# Patient Record
Sex: FEMALE | Race: BLACK OR AFRICAN AMERICAN | NOT HISPANIC OR LATINO | Employment: OTHER | URBAN - METROPOLITAN AREA
[De-identification: names, ages, dates, MRNs, and addresses within clinical notes are randomized per-mention and may not be internally consistent; named-entity substitution may affect disease eponyms.]

---

## 2023-02-01 ENCOUNTER — OFFICE VISIT (OUTPATIENT)
Dept: FAMILY MEDICINE | Facility: CLINIC | Age: 88
End: 2023-02-01
Payer: MEDICARE

## 2023-02-01 VITALS
DIASTOLIC BLOOD PRESSURE: 78 MMHG | WEIGHT: 162.5 LBS | RESPIRATION RATE: 20 BRPM | HEIGHT: 63 IN | OXYGEN SATURATION: 100 % | SYSTOLIC BLOOD PRESSURE: 132 MMHG | BODY MASS INDEX: 28.79 KG/M2 | TEMPERATURE: 98 F | HEART RATE: 71 BPM

## 2023-02-01 DIAGNOSIS — E44.1 MILD PROTEIN-CALORIE MALNUTRITION: ICD-10-CM

## 2023-02-01 DIAGNOSIS — I49.9 IRREGULAR HEART RHYTHM: ICD-10-CM

## 2023-02-01 DIAGNOSIS — E55.9 VITAMIN D DEFICIENCY: ICD-10-CM

## 2023-02-01 DIAGNOSIS — R63.4 WEIGHT LOSS: ICD-10-CM

## 2023-02-01 DIAGNOSIS — Z13.31 POSITIVE DEPRESSION SCREENING: ICD-10-CM

## 2023-02-01 DIAGNOSIS — Z23 NEED FOR PNEUMOCOCCAL VACCINATION: ICD-10-CM

## 2023-02-01 DIAGNOSIS — R26.89 IMBALANCE: ICD-10-CM

## 2023-02-01 DIAGNOSIS — F03.C0 SEVERE DEMENTIA WITHOUT BEHAVIORAL DISTURBANCE, PSYCHOTIC DISTURBANCE, MOOD DISTURBANCE, OR ANXIETY, UNSPECIFIED DEMENTIA TYPE: Primary | ICD-10-CM

## 2023-02-01 DIAGNOSIS — I10 PRIMARY HYPERTENSION: ICD-10-CM

## 2023-02-01 DIAGNOSIS — Z86.39 HISTORY OF ELEVATED GLUCOSE: ICD-10-CM

## 2023-02-01 LAB
ALBUMIN SERPL-MCNC: 3.9 G/DL (ref 3.4–4.8)
ALBUMIN/GLOB SERPL: 1.2 RATIO (ref 1.1–2)
ALP SERPL-CCNC: 78 UNIT/L (ref 40–150)
ALT SERPL-CCNC: 6 UNIT/L (ref 0–55)
AST SERPL-CCNC: 15 UNIT/L (ref 5–34)
BASOPHILS # BLD AUTO: 0.02 X10(3)/MCL (ref 0–0.2)
BASOPHILS NFR BLD AUTO: 0.6 %
BILIRUBIN DIRECT+TOT PNL SERPL-MCNC: 0.3 MG/DL
BUN SERPL-MCNC: 33.3 MG/DL (ref 9.8–20.1)
CALCIUM SERPL-MCNC: 9.3 MG/DL (ref 8.4–10.2)
CHLORIDE SERPL-SCNC: 105 MMOL/L (ref 98–111)
CHOLEST SERPL-MCNC: 174 MG/DL
CHOLEST/HDLC SERPL: 4 {RATIO} (ref 0–5)
CO2 SERPL-SCNC: 28 MMOL/L (ref 23–31)
CREAT SERPL-MCNC: 1.56 MG/DL (ref 0.55–1.02)
DEPRECATED CALCIDIOL+CALCIFEROL SERPL-MC: 48.9 NG/ML (ref 30–80)
EOSINOPHIL # BLD AUTO: 0.06 X10(3)/MCL (ref 0–0.9)
EOSINOPHIL NFR BLD AUTO: 1.9 %
ERYTHROCYTE [DISTWIDTH] IN BLOOD BY AUTOMATED COUNT: 13.5 % (ref 11.5–17)
EST. AVERAGE GLUCOSE BLD GHB EST-MCNC: 102.5 MG/DL
GFR SERPLBLD CREATININE-BSD FMLA CKD-EPI: 31 MLS/MIN/1.73/M2
GLOBULIN SER-MCNC: 3.3 GM/DL (ref 2.4–3.5)
GLUCOSE SERPL-MCNC: 89 MG/DL (ref 75–121)
HBA1C MFR BLD: 5.2 %
HCT VFR BLD AUTO: 34.6 % (ref 37–47)
HDLC SERPL-MCNC: 46 MG/DL (ref 35–60)
HGB BLD-MCNC: 11.2 GM/DL (ref 12–16)
IMM GRANULOCYTES # BLD AUTO: 0.01 X10(3)/MCL (ref 0–0.04)
IMM GRANULOCYTES NFR BLD AUTO: 0.3 %
LDLC SERPL CALC-MCNC: 110 MG/DL (ref 50–140)
LYMPHOCYTES # BLD AUTO: 1.38 X10(3)/MCL (ref 0.6–4.6)
LYMPHOCYTES NFR BLD AUTO: 42.6 %
MCH RBC QN AUTO: 30.4 PG
MCHC RBC AUTO-ENTMCNC: 32.4 MG/DL (ref 33–36)
MCV RBC AUTO: 93.8 FL (ref 80–94)
MONOCYTES # BLD AUTO: 0.2 X10(3)/MCL (ref 0.1–1.3)
MONOCYTES NFR BLD AUTO: 6.2 %
NEUTROPHILS # BLD AUTO: 1.57 X10(3)/MCL (ref 2.1–9.2)
NEUTROPHILS NFR BLD AUTO: 48.4 %
NRBC BLD AUTO-RTO: 0 %
PLATELET # BLD AUTO: 172 X10(3)/MCL (ref 130–400)
PMV BLD AUTO: 11.5 FL (ref 7.4–10.4)
POTASSIUM SERPL-SCNC: 3.3 MMOL/L (ref 3.5–5.1)
PROT SERPL-MCNC: 7.2 GM/DL (ref 5.8–7.6)
RBC # BLD AUTO: 3.69 X10(6)/MCL (ref 4.2–5.4)
SODIUM SERPL-SCNC: 142 MMOL/L (ref 132–146)
TRIGL SERPL-MCNC: 88 MG/DL (ref 37–140)
TSH SERPL-ACNC: 0.72 UIU/ML (ref 0.35–4.94)
VLDLC SERPL CALC-MCNC: 18 MG/DL
WBC # SPEC AUTO: 3.2 X10(3)/MCL (ref 4.5–11.5)

## 2023-02-01 PROCEDURE — 84443 ASSAY THYROID STIM HORMONE: CPT

## 2023-02-01 PROCEDURE — 90677 PCV20 VACCINE IM: CPT | Mod: PBBFAC

## 2023-02-01 PROCEDURE — 80061 LIPID PANEL: CPT

## 2023-02-01 PROCEDURE — 85025 COMPLETE CBC W/AUTO DIFF WBC: CPT

## 2023-02-01 PROCEDURE — 93005 ELECTROCARDIOGRAM TRACING: CPT

## 2023-02-01 PROCEDURE — 36415 COLL VENOUS BLD VENIPUNCTURE: CPT

## 2023-02-01 PROCEDURE — 99215 OFFICE O/P EST HI 40 MIN: CPT | Mod: PBBFAC

## 2023-02-01 PROCEDURE — 83036 HEMOGLOBIN GLYCOSYLATED A1C: CPT

## 2023-02-01 PROCEDURE — 82306 VITAMIN D 25 HYDROXY: CPT

## 2023-02-01 PROCEDURE — 80053 COMPREHEN METABOLIC PANEL: CPT

## 2023-02-01 PROCEDURE — G0009 ADMIN PNEUMOCOCCAL VACCINE: HCPCS | Mod: PBBFAC

## 2023-02-01 RX ORDER — BENAZEPRIL HYDROCHLORIDE 20 MG/1
20 TABLET ORAL DAILY
Qty: 30 TABLET | Refills: 2 | Status: SHIPPED | OUTPATIENT
Start: 2023-02-01 | End: 2023-04-27

## 2023-02-01 RX ADMIN — PNEUMOCOCCAL 20-VALENT CONJUGATE VACCINE 0.5 ML
2.2; 2.2; 2.2; 2.2; 2.2; 2.2; 2.2; 2.2; 2.2; 2.2; 2.2; 2.2; 2.2; 2.2; 2.2; 2.2; 4.4; 2.2; 2.2; 2.2 INJECTION, SUSPENSION INTRAMUSCULAR at 03:02

## 2023-02-01 NOTE — PATIENT INSTRUCTIONS
STOP benazepril/HCTZ combo pill    - new rx for plain Benazepril 20mg 1 tablet in the morning - sent to [facility]    STOP all other medications   - cyproheptadine, amlodipine, farxiga, potassium    We will check lab work today and adjust accordingly    We have ordered home health for physical therapy, aid assistance for bathing, and social work assessment for medical equipment and home safety

## 2023-02-01 NOTE — PROGRESS NOTES
[facility]  [facility] Note    DOS: 2/1/2023      Subjective:  Chief Complaint:    Chief Complaint   Patient presents with    Establish [facility]     Memory loss       History of Present Illness:  Danielle Loera is a 89 y.o. female with a PMH of HTN, DM, CKD,    Who presents to geriatric clinic today for:  Establishing Care - Initial GAC evaluation for cognitive impairment    Son Soy present for appointment and provides most of the history.  Other son Mario also partial caregiving, shared between two, along with coordination of granddaughter Alycia.  Patient currently lives alone in independent house, requiring additional caregiving lately and family is concerned.  Patient used to be independent and driving as of 4 years ago, but has had sharper decline in functional status in the past 2 years.  Patient no longer drives as family is concerned she would get lost and for safety.  Patient declines taking baths, and unclear if compliant on medications previously, but sons now come twice a day to give them to her and make sure she takes them.  Family denies patient has ever tried to elope or got lost otherwise on her own.  Patient no longer cooks, family brings/cooks meals for her daily.   Family notes patient has had weight loss, approx 5 pounds noted by them since Nov 2022.  Previously 166-167 lbs.  Patient herself notes she has low appetite, family feels she is not eating well if at all when she is by herself.  Patient does not want to leave her house, although family feels she would be safer living with them or in a memory care/nursing home.  Due to patient savings and assets,  patient does not qualify for medicaid.    Patient also noted to have hoarding of items in the house, family has attempted to clean the house but patient gets upset and agitated.  They want to consider adult day care services so she can get out of the house and socialize.  Currently she  goes once a week to [facility] near her house for bingo and social activities, and enjoys that.     Past Medical History:   Diagnosis Date    Primary hypertension 5/12/2023    Severe dementia without behavioral disturbance, psychotic disturbance, mood disturbance, or anxiety 2/23/2023    Vitamin D deficiency 5/12/2023      No past surgical history on file.   No family history on file.   Social History     Socioeconomic History    Marital status:     Number of children: 4   Occupational History    Occupation: retied   Tobacco Use    Smoking status: Never     Passive exposure: Never    Smokeless tobacco: Never   Substance and Sexual Activity    Alcohol use: Never    Drug use: Never    Sexual activity: Not Currently        Review of patient's allergies indicates:  No Known Allergies       Current Outpatient Medications:     benazepriL (LOTENSIN) 20 MG tablet, Take 1 tablet (20 mg total) by mouth once daily., Disp: 30 tablet, Rfl: 2    Current Facility-Administered Medications:     pneumoc 20-kath conj-dip cr(PF) (PREVNAR-20 (PF)) injection Syrg 0.5 mL, 0.5 mL, Intramuscular, 1 time in Clinic/HOD, Samra Alfredo MD     Review of Systems   Constitutional:  Positive for weight loss. Negative for chills, fever and malaise/fatigue.   HENT:  Negative for congestion, hearing loss and sore throat.    Eyes:  Negative for blurred vision.   Respiratory:  Negative for cough, sputum production and shortness of breath.    Cardiovascular:  Negative for chest pain, palpitations and leg swelling.   Gastrointestinal:  Negative for abdominal pain, constipation, diarrhea, heartburn, nausea and vomiting.   Genitourinary:  Negative for dysuria, frequency and urgency.   Musculoskeletal:  Negative for back pain, falls, joint pain and myalgias.   Neurological:  Negative for dizziness, focal weakness, weakness and headaches.   Psychiatric/Behavioral:  Positive for memory loss. Negative for depression. The patient is nervous/anxious.  "The patient does not have insomnia.       Objective:   Vitals:    02/01/23 1334   BP: 132/78   BP Location: Left arm   Patient Position: Sitting   BP Method: Medium (Automatic)   Pulse: 71   Resp: 20   Temp: 97.5 °F (36.4 °C)   TempSrc: Oral   SpO2: 100%   Weight: 73.7 kg (162 lb 7.7 oz)   Height: 5' 2.99" (1.6 m)        Physical Exam  Vitals reviewed.   Constitutional:       General: She is not in acute distress.     Appearance: Normal appearance. She is normal weight.   HENT:      Head: Normocephalic and atraumatic.      Right Ear: External ear normal.      Left Ear: External ear normal.      Nose: Nose normal. No congestion or rhinorrhea.      Mouth/Throat:      Mouth: Mucous membranes are moist.      Pharynx: Oropharynx is clear. No oropharyngeal exudate or posterior oropharyngeal erythema.   Eyes:      Extraocular Movements: Extraocular movements intact.      Conjunctiva/sclera: Conjunctivae normal.      Pupils: Pupils are equal, round, and reactive to light.   Cardiovascular:      Rate and Rhythm: Normal rate. Rhythm irregular.      Pulses: Normal pulses.      Heart sounds: Normal heart sounds. No murmur heard.  Pulmonary:      Effort: Pulmonary effort is normal. No respiratory distress.      Breath sounds: Normal breath sounds. No wheezing, rhonchi or rales.   Abdominal:      General: Abdomen is flat. Bowel sounds are normal. There is no distension.      Palpations: Abdomen is soft.      Tenderness: There is no abdominal tenderness.   Musculoskeletal:         General: Normal range of motion.      Cervical back: Normal range of motion and neck supple.      Right lower leg: No edema.      Left lower leg: No edema.   Skin:     General: Skin is warm and dry.   Neurological:      General: No focal deficit present.      Mental Status: She is alert. Mental status is at baseline. She is disoriented.   Psychiatric:         Attention and Perception: Attention and perception normal.         Mood and Affect: Mood normal. " "        Speech: Speech normal.         Behavior: Behavior is slowed. Behavior is cooperative.         Cognition and Memory: Cognition is impaired. Memory is impaired.        Geriatric Assessment:     Activities of Daily Living (ADLs):  Walking independent - has standard walker and cane, but does not want to use  Transferring assistance needed patient states she does on her own but son states she has difficulty getting up   Feeding assistance needed right arm weakness s/p poison ivy infection, son notices that patient holds arm with other hand to feed herself   Bathing assistance needed has shower tub combo, - has shower chair but cant get into tub, grab bars   Toileting independent - has raised toilet seat "cushion" no incontinence of urine or stool per patient and son   Dressing/Grooming independent    Instrumental Activities of Daily Living (IADLs):  Finances fully dependent  Transportation fully dependent  Cooking fully dependent  Cleaning/Laundry fully dependent - maid service twice a month   Shopping fully dependent  Telephone / Communication independent  Medications fully dependent    Cognitive Assessment:  SLUMS performed date: 2/1/23 and scanned to patient's chart in media, Score 3/30    Depression Assessment:   Depression Patient Health Questionnaire 2/1/2023 2/1/2023   Over the last two weeks how often have you been bothered by little interest or pleasure in doing things Not at all Not at all   Over the last two weeks how often have you been bothered by feeling down, depressed or hopeless Not at all Not at all   PHQ-2 Total Score 0 0   Over the last two weeks how often have you been bothered by trouble falling or staying asleep, or sleeping too much Not at all -   Over the last two weeks how often have you been bothered by feeling tired or having little energy Not at all -   Over the last two weeks how often have you been bothered by a poor appetite or overeating Several days -   Over the last two weeks " how often have you been bothered by feeling bad about yourself - or that you are a failure or have let yourself or your family down Not at all -   Over the last two weeks how often have you been bothered by trouble concentrating on things, such as reading the newspaper or watching television Several days -   Over the last two weeks how often have you been bothered by moving or speaking so slowly that other people could have noticed. Or the opposite - being so fidgety or restless that you have been moving around a lot more than usual. Several days -   Over the last two weeks how often have you been bothered by thoughts that you would be better off dead, or of hurting yourself Not at all -   If you checked off any problems, how difficult have these problems made it for you to do your work, take care of things at home or get along with other people? Somewhat difficult -   Total Score 3 -   Interpretation Minimal or None -       Mobility Assessment:   - Timed Up and Go (10 ft < 12 secs): Not able  - Sit to  chair x 3 (without using arms): Not able  - Tandem stance and gait: Not able  - semi Tandem stance and gait: Not able  - Side by Side stance (> 10 secs): Able  - One Legged stance: Not able  - Functional Reach (>7in/18cm): Not able    Assistive Devices:   straight cane and walker  Has life alert necklace button but never wears it  Glasses: yes  Hearing Aids: no - patient is hard of hearing but refuses hearing test refuses to wear hearing aid  Dentures: no    Social support/Living Situation: lives alone in single story house , has person to cut grass and maintain yard.   No wandering/elopement that family is aware of     Polypharmacy Identified? (>9 meds) no    Advanced Care Planning:  - [place] POST: not done yet, counseled patient and information provided  - Medical POA: not done yet, counseled patient and information provided    Recent labs:  CBC:  Lab Results   Component Value Date    WBC 4.0 (L) 06/25/2020     RBC 4.46 06/25/2020    HGB 13.3 06/25/2020    HCT 42.3 06/25/2020    MCV 94.8 (H) 06/25/2020    MCH 29.8 06/25/2020    MCHC 31.4 (L) 06/25/2020    RDW 13.1 06/25/2020     06/25/2020    MPV 10.5 06/25/2020      CMP:  Sodium Level   Date Value Ref Range Status   06/25/2020 138 136 - 145 mmol/L Final     Potassium Level   Date Value Ref Range Status   06/25/2020 3.5 3.5 - 5.1 mmol/L Final     Carbon Dioxide   Date Value Ref Range Status   06/25/2020 33 (H) 23 - 31 mmol/L Final     Blood Urea Nitrogen   Date Value Ref Range Status   06/25/2020 18.1 9.8 - 20.1 mg/dL Final     Creatinine   Date Value Ref Range Status   06/25/2020 0.99 0.55 - 1.02 mg/dL Final     Calcium Level Total   Date Value Ref Range Status   06/25/2020 8.6 8.4 - 10.2 mg/dL Final     Albumin Level   Date Value Ref Range Status   06/25/2020 3.8 3.4 - 5.0 gm/dL Final     Bilirubin Total   Date Value Ref Range Status   06/25/2020 0.4 <<=1.5 mg/dL Final     Alkaline Phosphatase   Date Value Ref Range Status   06/25/2020 84 40 - 150 unit/L Final     Aspartate Aminotransferase   Date Value Ref Range Status   06/25/2020 18 5 - 34 unit/L Final     Alanine Aminotransferase   Date Value Ref Range Status   06/25/2020 9 0 - 55 unit/L Final     Estimated GFR-Non    Date Value Ref Range Status   06/25/2020 56  Final      BMP:  Lab Results   Component Value Date     06/25/2020    K 3.5 06/25/2020    CO2 33 (H) 06/25/2020    BUN 18.1 06/25/2020    CREATININE 0.99 06/25/2020    CALCIUM 8.6 06/25/2020    EGFRNONAA 56 06/25/2020      Lipid Panel:  No results found for: CHOL  No results found for: HDL  No results found for: LDLCALC  No results found for: TRIG  No results found for: CHOLHDL   HbA1c:  No results found for: LABA1C, HGBA1C   TSH:  No results found for: TSH    EKG:  NSR with multiple PACs.     Recent Imaging:  X-Ray Chest PA And Lateral  EXAMINATION  XR Chest 1 View     INDICATION  Chest Pain     Comparison: None     FINDINGS  The  cardiomediastinal silhouette is within normal limits. The lungs  are clear without focal consolidation. There is no pleural effusion or  definite pneumothorax. There is no acute bony abnormality identified.     IMPRESSION:  No acute abnormality of the chest.      Electronically Signed By: Adriana Jung MD  Date/Time Signed: 06/25/2020 10:26       Assessment & Plan:    Danielle Loera is presenting as above and will be treated as follows:    Danielle was seen today for establish care.    Diagnoses and all orders for this visit:    Severe dementia without behavioral disturbance, psychotic disturbance, mood disturbance, or anxiety, unspecified dementia type  -     Cancel: Ambulatory referral/consult to Home Health; Future  -     CBC Auto Differential; Future  -     Lipid Panel; Future  -     TSH; Future  -     TSH  -     Lipid Panel  -     CBC Auto Differential  -     IN OFFICE EKG 12-LEAD (to [place])  -     Ambulatory referral/consult to Home Health; Future    Imbalance  -     Cancel: Ambulatory referral/consult to Home Health; Future  -     CBC Auto Differential; Future  -     Comprehensive Metabolic Panel; Future  -     TSH; Future  -     TSH  -     Comprehensive Metabolic Panel  -     CBC Auto Differential  -     IN OFFICE EKG 12-LEAD (to [place])  -     Ambulatory referral/consult to Home Health; Future    Primary hypertension  -     Comprehensive Metabolic Panel; Future  -     Lipid Panel; Future  -     Discontinue: benazepriL (LOTENSIN) 20 MG tablet; Take 1 tablet (20 mg total) by mouth once daily.  -     Lipid Panel  -     Comprehensive Metabolic Panel    Vitamin D deficiency  -     Vitamin D; Future  -     Vitamin D    History of elevated glucose  -     Comprehensive Metabolic Panel; Future  -     Hemoglobin A1C; Future  -     Hemoglobin A1C  -     Comprehensive Metabolic Panel    Mild protein-calorie malnutrition  -     TSH; Future  -     TSH    Weight loss - Discussed increased supervision with feeding and  providing meals, okay to add high calorie items and nutritional supplements BID.  Family to assist.     Need for pneumococcal vaccination  -     pneumoc 20-kath conj-dip cr(PF) (PREVNAR-20 (PF)) injection Syrg 0.5 mL    Positive depression screening  Comments:  I have reviewed the positive depression score which warrants active treatment with psychotherapy and/or medications.    Irregular heart rhythm  -     IN OFFICE EKG 12-LEAD (to [place]) - noted to be PACs, patient and family do not want further workup due to age.  Patient not complaining of chest pain, palpitations, syncope, dizziness, or other symptoms. Will continue to follow.          Health Maintenance Reviewed:  Immunization History   Administered Date(s) Administered    COVID-19, MRNA, LN-S, PF (Pfizer) (Purple Cap) 02/05/2021, 02/26/2021, 11/04/2021    Influenza - High Dose - PF (65 years and older) 10/09/2018, 09/23/2019    Influenza - Quadrivalent 10/20/2020    Influenza - Quadrivalent - High Dose - PF (65 years and older) 10/06/2021    Pneumococcal Polysaccharide - 23 Valent 11/20/2001        - Covid done, date: 2 initial shots and 1 booster  - Flu done, date: October 2022     Samra Alfredo MD  Attending - Family Medicine / Geriatric Medicine  [facility] [place], [facility]

## 2023-02-23 DIAGNOSIS — F03.C11 SEVERE DEMENTIA WITH AGITATION, UNSPECIFIED DEMENTIA TYPE: Primary | ICD-10-CM

## 2023-02-23 RX ORDER — TRAZODONE HYDROCHLORIDE 50 MG/1
25 TABLET ORAL NIGHTLY
Qty: 45 TABLET | Refills: 2 | Status: SHIPPED | OUTPATIENT
Start: 2023-02-23 | End: 2023-08-02

## 2023-02-23 NOTE — TELEPHONE ENCOUNTER
Spoke with Patient's granddaughter, concern for worsening behaviors and agitation specfically around bathing time.  Patient also not sleeping well at night and waking up in the middle of the night and pacing the house.  Home health evaluation concerned for patient safety living at home alone given cognitive impairment.  Recommended home health social work evaluation for recommendations for assisted living/memory care vs nursing home options.  Will also discuss with ABHAY Astudillo regarding options.  In meantime,  will attempt trazodone 25mg qhs (approx 730pm) then attempt bathing between 8-9pm with bedtime between 9-10 pm to monitor if that calms behavior.  Okay to increase to 50mg in 2 weeks for improved response if needed.  Will move up follow up appointment for next month to monitor response to meds, vs if need to change to seroquel.     Samra Alfredo MD  Attending - Family Medicine / Geriatric Medicine  [facility] [place], [facility]

## 2023-04-24 DIAGNOSIS — I10 PRIMARY HYPERTENSION: ICD-10-CM

## 2023-04-27 RX ORDER — BENAZEPRIL HYDROCHLORIDE 20 MG/1
TABLET ORAL
Qty: 90 TABLET | Refills: 1 | Status: SHIPPED | OUTPATIENT
Start: 2023-04-27 | End: 2023-07-26 | Stop reason: SDUPTHER

## 2023-05-01 ENCOUNTER — OFFICE VISIT (OUTPATIENT)
Dept: FAMILY MEDICINE | Facility: CLINIC | Age: 88
End: 2023-05-01
Payer: MEDICARE

## 2023-05-01 VITALS
RESPIRATION RATE: 20 BRPM | BODY MASS INDEX: 28.56 KG/M2 | WEIGHT: 161.19 LBS | OXYGEN SATURATION: 98 % | HEIGHT: 63 IN | TEMPERATURE: 98 F | SYSTOLIC BLOOD PRESSURE: 132 MMHG | DIASTOLIC BLOOD PRESSURE: 64 MMHG | HEART RATE: 71 BPM

## 2023-05-01 DIAGNOSIS — F03.C0 SEVERE DEMENTIA WITHOUT BEHAVIORAL DISTURBANCE, PSYCHOTIC DISTURBANCE, MOOD DISTURBANCE, OR ANXIETY, UNSPECIFIED DEMENTIA TYPE: Primary | ICD-10-CM

## 2023-05-01 DIAGNOSIS — I10 PRIMARY HYPERTENSION: ICD-10-CM

## 2023-05-01 DIAGNOSIS — D63.1 ANEMIA DUE TO STAGE 3B CHRONIC KIDNEY DISEASE: ICD-10-CM

## 2023-05-01 DIAGNOSIS — N18.32 STAGE 3B CHRONIC KIDNEY DISEASE: ICD-10-CM

## 2023-05-01 DIAGNOSIS — E44.1 MILD PROTEIN-CALORIE MALNUTRITION: ICD-10-CM

## 2023-05-01 DIAGNOSIS — N18.32 ANEMIA DUE TO STAGE 3B CHRONIC KIDNEY DISEASE: ICD-10-CM

## 2023-05-01 PROCEDURE — 99214 OFFICE O/P EST MOD 30 MIN: CPT | Mod: PBBFAC | Performed by: FAMILY MEDICINE

## 2023-05-02 PROBLEM — F03.C0 SEVERE DEMENTIA WITHOUT BEHAVIORAL DISTURBANCE, PSYCHOTIC DISTURBANCE, MOOD DISTURBANCE, OR ANXIETY: Status: ACTIVE | Noted: 2023-02-23

## 2023-05-12 PROBLEM — N18.31 ANEMIA DUE TO STAGE 3A CHRONIC KIDNEY DISEASE: Status: ACTIVE | Noted: 2023-05-12

## 2023-05-12 PROBLEM — D63.1 ANEMIA DUE TO STAGE 3A CHRONIC KIDNEY DISEASE: Status: ACTIVE | Noted: 2023-05-12

## 2023-05-12 PROBLEM — N18.32 STAGE 3B CHRONIC KIDNEY DISEASE: Status: ACTIVE | Noted: 2023-05-12

## 2023-05-12 PROBLEM — E55.9 VITAMIN D DEFICIENCY: Status: ACTIVE | Noted: 2023-05-12

## 2023-05-12 PROBLEM — I10 PRIMARY HYPERTENSION: Status: ACTIVE | Noted: 2023-05-12

## 2023-05-12 NOTE — PROGRESS NOTES
"  [facility] Geriatric Clinic Note    DOS: 5/1/2023      Subjective:  Chief Complaint:    Chief Complaint   Patient presents with    Follow-up     Followup for Dementia       History of Present Illness:  Danielle Loera is a 89 y.o. female with a PMH of HTN, DM, CKD,    Who presents to geriatric clinic today for:  Follow up of Chronic Conditions: Dementia      Son Soy and granddaughter Alycia present for visit and provide majority of history.  Patient has since initiated home health services, whom were concern for patient safety living alone and conditions of house.  Family has gone to fix many items, but patient continues to rebekah items and becomes agitated when family attempts to help clean or declutter items in the house.  Due to patient's financial assets, she does not qualify for medicaid to look into nursing home status, however not liquid funds to be able to use towards memory care or other health expenses at this time.  We discussed options including PACE program, and family would like referral to look into this as an option for patient to have supervision, therapy, assistance with meds, and community activites during the day.  Patient is agreeable to trying this.    Family also requests further meeting with ABHAY/MARITZA for other financial assistance in "spend down" to assist patient further in receiving services.   Otherwise, patient appetite is improved, maintained weight well from prior visit.  BP mildly elevated from initial intake, but improved once rested manually.   Reviewed labs, - CKD stable, minimal hypokalemia, told to use diet supplement of potassium foods, will continue to monitor, if continued to be low could be factor of ACE, will watch,     Past Medical History:   Diagnosis Date    Anemia due to stage 3a chronic kidney disease 5/12/2023    Primary hypertension 5/12/2023    Severe dementia without behavioral disturbance, psychotic disturbance, mood " disturbance, or anxiety 2/23/2023    Vitamin D deficiency 5/12/2023      No past surgical history on file.   No family history on file.   Social History     Socioeconomic History    Marital status:     Number of children: 4   Occupational History    Occupation: retied   Tobacco Use    Smoking status: Never     Passive exposure: Never    Smokeless tobacco: Never   Substance and Sexual Activity    Alcohol use: Never    Drug use: Never    Sexual activity: Not Currently     Social Determinants of Health     Financial Resource Strain: Low Risk     Difficulty of Paying Living Expenses: Not hard at all   Food Insecurity: No Food Insecurity    Worried About Running Out of Food in the Last Year: Never true    Ran Out of Food in the Last Year: Never true   Transportation Needs: No Transportation Needs    Lack of Transportation (Medical): No    Lack of Transportation (Non-Medical): No   Physical Activity: Insufficiently Active    Days of Exercise per Week: 3 days    Minutes of Exercise per Session: 30 min   Stress: No Stress Concern Present    Feeling of Stress : Not at all   Social Connections: Moderately Isolated    Frequency of Communication with Friends and Family: More than three times a week    Frequency of Social Gatherings with Friends and Family: More than three times a week    Attends Restorationism Services: More than 4 times per year    Active Member of Clubs or Organizations: No    Attends Club or Organization Meetings: Never    Marital Status:    Housing Stability: Low Risk     Unable to Pay for Housing in the Last Year: No    Number of Places Lived in the Last Year: 1    Unstable Housing in the Last Year: No        Review of patient's allergies indicates:  No Known Allergies       Current Outpatient Medications:     benazepriL (LOTENSIN) 20 MG tablet, TAKE 1 TABLET(20 MG) BY MOUTH EVERY DAY, Disp: 90 tablet, Rfl: 1    traZODone (DESYREL) 50 MG tablet, Take 0.5 tablets (25 mg total) by mouth every  "evening., Disp: 45 tablet, Rfl: 2     Review of Systems   Constitutional:  Negative for chills, fever, malaise/fatigue and weight loss.   HENT:  Negative for congestion, hearing loss and sore throat.    Eyes:  Negative for blurred vision.   Respiratory:  Negative for cough, sputum production and shortness of breath.    Cardiovascular:  Negative for chest pain, palpitations and leg swelling.   Gastrointestinal:  Negative for abdominal pain, constipation, diarrhea, heartburn, nausea and vomiting.   Genitourinary:  Negative for dysuria, frequency and urgency.   Musculoskeletal:  Negative for back pain, falls, joint pain and myalgias.   Neurological:  Negative for dizziness, focal weakness, weakness and headaches.   Psychiatric/Behavioral:  Positive for memory loss. Negative for depression. The patient is not nervous/anxious and does not have insomnia.       Objective:   Vitals:    05/01/23 1328 05/01/23 1333   BP: (!) 142/77 132/64   BP Location: Left arm Left arm   Patient Position: Sitting Sitting   BP Method: Large (Automatic) Large (Automatic)   Pulse: 71    Resp: 20    Temp: 97.5 °F (36.4 °C)    TempSrc: Oral    SpO2: 98%    Weight: 73.1 kg (161 lb 3.2 oz)    Height: 5' 2.99" (1.6 m)         Physical Exam  Vitals reviewed.   Constitutional:       General: She is not in acute distress.     Appearance: Normal appearance. She is normal weight.   HENT:      Head: Normocephalic and atraumatic.      Right Ear: External ear normal.      Left Ear: External ear normal.      Nose: Nose normal. No congestion or rhinorrhea.      Mouth/Throat:      Mouth: Mucous membranes are moist.      Pharynx: Oropharynx is clear. No oropharyngeal exudate or posterior oropharyngeal erythema.   Eyes:      Extraocular Movements: Extraocular movements intact.      Conjunctiva/sclera: Conjunctivae normal.      Pupils: Pupils are equal, round, and reactive to light.   Cardiovascular:      Rate and Rhythm: Normal rate and regular rhythm.      " Pulses: Normal pulses.      Heart sounds: Normal heart sounds. No murmur heard.  Pulmonary:      Effort: Pulmonary effort is normal. No respiratory distress.      Breath sounds: Normal breath sounds. No wheezing, rhonchi or rales.   Abdominal:      General: Abdomen is flat. Bowel sounds are normal. There is no distension.      Palpations: Abdomen is soft.      Tenderness: There is no abdominal tenderness.   Musculoskeletal:         General: Normal range of motion.      Cervical back: Normal range of motion and neck supple.      Right lower leg: No edema.      Left lower leg: No edema.   Skin:     General: Skin is warm and dry.   Neurological:      General: No focal deficit present.      Mental Status: She is alert. Mental status is at baseline. She is disoriented.   Psychiatric:         Attention and Perception: Attention and perception normal.         Mood and Affect: Mood normal.         Speech: Speech normal.         Behavior: Behavior is slowed. Behavior is cooperative.         Cognition and Memory: Cognition is impaired. Memory is impaired.      Recent labs:  CBC:  Lab Results   Component Value Date    WBC 3.2 (L) 02/01/2023    RBC 3.69 (L) 02/01/2023    HGB 11.2 (L) 02/01/2023    HCT 34.6 (L) 02/01/2023    MCV 93.8 02/01/2023    MCH 30.4 02/01/2023    MCHC 32.4 (L) 02/01/2023    RDW 13.5 02/01/2023     02/01/2023    MPV 11.5 (H) 02/01/2023      CMP:  Sodium Level   Date Value Ref Range Status   02/01/2023 142 132 - 146 mmol/L Final     Potassium Level   Date Value Ref Range Status   02/01/2023 3.3 (L) 3.5 - 5.1 mmol/L Final     Carbon Dioxide   Date Value Ref Range Status   02/01/2023 28 23 - 31 mmol/L Final     Blood Urea Nitrogen   Date Value Ref Range Status   02/01/2023 33.3 (H) 9.8 - 20.1 mg/dL Final     Creatinine   Date Value Ref Range Status   02/01/2023 1.56 (H) 0.55 - 1.02 mg/dL Final     Calcium Level Total   Date Value Ref Range Status   02/01/2023 9.3 8.4 - 10.2 mg/dL Final     Albumin  Level   Date Value Ref Range Status   02/01/2023 3.9 3.4 - 4.8 g/dL Final     Bilirubin Total   Date Value Ref Range Status   02/01/2023 0.3 <=1.5 mg/dL Final     Alkaline Phosphatase   Date Value Ref Range Status   02/01/2023 78 40 - 150 unit/L Final     Aspartate Aminotransferase   Date Value Ref Range Status   02/01/2023 15 5 - 34 unit/L Final     Alanine Aminotransferase   Date Value Ref Range Status   02/01/2023 6 0 - 55 unit/L Final     Estimated GFR-Non    Date Value Ref Range Status   06/25/2020 56  Final      BMP:  Lab Results   Component Value Date     02/01/2023    K 3.3 (L) 02/01/2023    CO2 28 02/01/2023    BUN 33.3 (H) 02/01/2023    CREATININE 1.56 (H) 02/01/2023    CALCIUM 9.3 02/01/2023    EGFRNONAA 56 06/25/2020      Lipid Panel:  Lab Results   Component Value Date    CHOL 174 02/01/2023     Lab Results   Component Value Date    HDL 46 02/01/2023     No results found for: LDLCALC  Lab Results   Component Value Date    TRIG 88 02/01/2023     No results found for: CHOLHDL   HbA1c:  Lab Results   Component Value Date    HGBA1C 5.2 02/01/2023      TSH:  Lab Results   Component Value Date    TSH 0.719 02/01/2023       Assessment & Plan:    Danielle Loera is presenting as above and will be treated as follows:    Danielle was seen today for follow-up.    Diagnoses and all orders for this visit:    Severe dementia without behavioral disturbance, psychotic disturbance, mood disturbance, or anxiety, unspecified dementia type  -External referral sent to PACE program.   -Will make meeting with ABHAY Mrs. Latham to discuss financial spenddown and other support options    Mild protein-calorie malnutrition  - patient maintaining weight and currently stable, continue to monitor    Anemia due to stage 3b chronic kidney disease  - will continue to monitor, currently stable    Stage 3b chronic kidney disease  - will continue to monitor, currently stable    Primary hypertension  - rechecked after  rest, within range for age.  Continue benazepril, discussed if worsened renal function and potassium derangement, may consider other agent in future, otherwise patient tolerating well.       Health Maintenance Reviewed:  Immunization History   Administered Date(s) Administered    COVID-19, MRNA, LN-S, PF (Pfizer) (Purple Cap) 02/05/2021, 02/26/2021, 11/04/2021    Influenza - High Dose - PF (65 years and older) 10/09/2018, 09/23/2019    Influenza - Quadrivalent 10/20/2020    Influenza - Quadrivalent - High Dose - PF (65 years and older) 10/06/2021    Pneumococcal Conjugate - 20 Valent 02/01/2023    Pneumococcal Polysaccharide - 23 Valent 11/20/2001        - Covid done, date: 2 initial shots and 1 booster  - Flu done, date: October 2022 outside pharmacy    Samra Alfredo MD  Attending - Family Medicine / Geriatric Medicine  [facility], [facility]

## 2023-07-26 DIAGNOSIS — I10 PRIMARY HYPERTENSION: ICD-10-CM

## 2023-07-26 RX ORDER — BENAZEPRIL HYDROCHLORIDE 20 MG/1
20 TABLET ORAL DAILY
Qty: 90 TABLET | Refills: 1 | Status: ON HOLD | OUTPATIENT
Start: 2023-07-26 | End: 2023-08-30 | Stop reason: SDUPTHER

## 2023-08-02 ENCOUNTER — OFFICE VISIT (OUTPATIENT)
Dept: FAMILY MEDICINE | Facility: CLINIC | Age: 88
End: 2023-08-02
Payer: MEDICARE

## 2023-08-02 VITALS
HEIGHT: 63 IN | TEMPERATURE: 98 F | BODY MASS INDEX: 26.95 KG/M2 | SYSTOLIC BLOOD PRESSURE: 148 MMHG | OXYGEN SATURATION: 100 % | DIASTOLIC BLOOD PRESSURE: 78 MMHG | HEART RATE: 74 BPM | RESPIRATION RATE: 20 BRPM | WEIGHT: 152.13 LBS

## 2023-08-02 DIAGNOSIS — F03.C0 SEVERE DEMENTIA WITHOUT BEHAVIORAL DISTURBANCE, PSYCHOTIC DISTURBANCE, MOOD DISTURBANCE, OR ANXIETY, UNSPECIFIED DEMENTIA TYPE: ICD-10-CM

## 2023-08-02 DIAGNOSIS — N18.32 STAGE 3B CHRONIC KIDNEY DISEASE: ICD-10-CM

## 2023-08-02 DIAGNOSIS — I10 PRIMARY HYPERTENSION: Primary | ICD-10-CM

## 2023-08-02 DIAGNOSIS — R63.4 WEIGHT LOSS: ICD-10-CM

## 2023-08-02 PROCEDURE — 99214 OFFICE O/P EST MOD 30 MIN: CPT | Mod: PBBFAC | Performed by: FAMILY MEDICINE

## 2023-08-02 RX ORDER — NAPROXEN SODIUM 220 MG/1
81 TABLET, FILM COATED ORAL DAILY
Status: ON HOLD | COMMUNITY
End: 2023-10-02 | Stop reason: CLARIF

## 2023-08-02 NOTE — PROGRESS NOTES
[facility]  [facility] Geriatric Clinic Note    DOS: 8/2/2023      Subjective:  Chief Complaint:    Chief Complaint   Patient presents with    Dementia     3 month FU       History of Present Illness:  Danielle Loera is a 89 y.o. female with a PMH of PMH of HTN, DM, CKD,.    Who presents to geriatric clinic today for:    Follow up of Chronic Conditions: Dementia      Son Hilda and granddaughter Alycia present for visit and provide majority of history  No changes in memory since last visit   Still forgets where she is sometimes, forgets children names and who they are   Lives alone still but has son who lives next door and multiple neighbors who check on her throughout the day   Hx of wandering and sons have locked her thorpe outside and no issues since   Stove has been disconnected   Discussed possible move to institution which she is not agreeable   Does not have HH currently   No issues with gait, no recent falls   No bladder or bowel issues   She did not qualify for the PACE program   No other complaints or problems per son     She has had about a 9lb weight loss in the past 3 months   Son reports she eats small meals intermittently and mainly likes ice cream, sweats which they were restricting   No issues with dysphagia or choking   No fever, vomiting, abdominal pain   Normal bowel function       Past Medical History:   Diagnosis Date    Anemia due to stage 3a chronic kidney disease 5/12/2023    Primary hypertension 5/12/2023    Severe dementia without behavioral disturbance, psychotic disturbance, mood disturbance, or anxiety 2/23/2023    Vitamin D deficiency 5/12/2023      History reviewed. No pertinent surgical history.   History reviewed. No pertinent family history.   Social History     Socioeconomic History    Marital status:     Number of children: 4   Occupational History    Occupation: retied   Tobacco Use    Smoking status: Never     Passive exposure: Never     Smokeless tobacco: Never   Substance and Sexual Activity    Alcohol use: Never    Drug use: Never    Sexual activity: Not Currently     Social Determinants of Health     Financial Resource Strain: Low Risk  (8/2/2023)    Overall Financial Resource Strain (CARDIA)     Difficulty of Paying Living Expenses: Not hard at all   Food Insecurity: No Food Insecurity (8/2/2023)    Hunger Vital Sign     Worried About Running Out of Food in the Last Year: Never true     Ran Out of Food in the Last Year: Never true   Transportation Needs: No Transportation Needs (8/2/2023)    PRAPARE - Transportation     Lack of Transportation (Medical): No     Lack of Transportation (Non-Medical): No   Physical Activity: Insufficiently Active (8/2/2023)    Exercise Vital Sign     Days of Exercise per Week: 1 day     Minutes of Exercise per Session: 10 min   Stress: No Stress Concern Present (8/2/2023)    [employer] [place] [employer] - Occupational Stress Questionnaire     Feeling of Stress : Not at all   Social Connections: Moderately Isolated (8/2/2023)    Social Connection and Isolation Panel [NHANES]     Frequency of Communication with Friends and Family: More than three times a week     Frequency of Social Gatherings with Friends and Family: More than three times a week     Attends Rastafari Services: More than 4 times per year     Active Member of Clubs or Organizations: No     Attends Club or Organization Meetings: Never     Marital Status:    Housing Stability: Low Risk  (8/2/2023)    Housing Stability Vital Sign     Unable to Pay for Housing in the Last Year: No     Number of Places Lived in the Last Year: 1     Unstable Housing in the Last Year: No        Health Maintenance Reviewed:  Immunization History   Administered Date(s) Administered    COVID-19, MRNA, LN-S, PF (Pfizer) (Purple Cap) 02/05/2021, 02/26/2021, 11/04/2021    Influenza - High Dose - PF (65 years and older) 10/09/2018, 09/23/2019    Influenza -  "Quadrivalent 10/20/2020    Influenza - Quadrivalent - High Dose - PF (65 years and older) 10/06/2021    Pneumococcal Conjugate - 20 Valent 02/01/2023    Pneumococcal Polysaccharide - 23 Valent 11/20/2001    Td - PF (ADULT) 09/13/1966, 10/13/1966, 10/28/2003, 11/15/2004        Review of patient's allergies indicates:  No Known Allergies       Current Outpatient Medications:     aspirin 81 MG Chew, Take 81 mg by mouth once daily., Disp: , Rfl:     benazepriL (LOTENSIN) 20 MG tablet, Take 1 tablet (20 mg total) by mouth once daily., Disp: 90 tablet, Rfl: 1     Review of Systems   Constitutional:  Negative for fever.   Respiratory:  Negative for cough and shortness of breath.    Cardiovascular:  Negative for chest pain.   Gastrointestinal:  Negative for abdominal pain, constipation, diarrhea and vomiting.        Objective:   Vitals:    08/02/23 1144 08/02/23 1232   BP: (!) 144/76 (!) 148/78   BP Location: Left arm Left arm   Patient Position: Sitting Sitting   BP Method: Medium (Automatic) Medium (Automatic)   Pulse: 74    Resp: 20    Temp: 97.5 °F (36.4 °C)    TempSrc: Oral    SpO2: 100%    Weight: 69 kg (152 lb 1.9 oz)    Height: 5' 2.99" (1.6 m)         Physical Exam  Constitutional:       Comments: Alert to person and place. Intermittent confusion, answers some questions appropriately. Very pleasant    Eyes:      Conjunctiva/sclera: Conjunctivae normal.   Cardiovascular:      Rate and Rhythm: Normal rate and regular rhythm.      Heart sounds: No murmur heard.     No friction rub. No gallop.   Pulmonary:      Effort: Pulmonary effort is normal. No respiratory distress.      Breath sounds: Normal breath sounds. No wheezing or rales.   Abdominal:      General: Abdomen is flat. Bowel sounds are normal. There is no distension.      Palpations: Abdomen is soft.      Tenderness: There is no abdominal tenderness.   Musculoskeletal:      Right lower leg: No edema.   Skin:     General: Skin is warm.   Neurological:      " General: No focal deficit present.      Mental Status: She is disoriented.          Depression Assessment:   Depression Patient Health Questionnaire 8/2/2023 2/1/2023 2/1/2023   Over the last two weeks how often have you been bothered by little interest or pleasure in doing things Not at all Not at all Not at all   Over the last two weeks how often have you been bothered by feeling down, depressed or hopeless Nearly every day Not at all Not at all   PHQ-2 Total Score 3 0 0   Over the last two weeks how often have you been bothered by trouble falling or staying asleep, or sleeping too much Not at all Not at all -   Over the last two weeks how often have you been bothered by feeling tired or having little energy Several days Not at all -   Over the last two weeks how often have you been bothered by a poor appetite or overeating Several days Several days -   Over the last two weeks how often have you been bothered by feeling bad about yourself - or that you are a failure or have let yourself or your family down Not at all Not at all -   Over the last two weeks how often have you been bothered by trouble concentrating on things, such as reading the newspaper or watching television Several days Several days -   Over the last two weeks how often have you been bothered by moving or speaking so slowly that other people could have noticed. Or the opposite - being so fidgety or restless that you have been moving around a lot more than usual. Not at all Several days -   Over the last two weeks how often have you been bothered by thoughts that you would be better off dead, or of hurting yourself Not at all Not at all -   If you checked off any problems, how difficult have these problems made it for you to do your work, take care of things at home or get along with other people? Somewhat difficult Somewhat difficult -   Total Score 6 3 -   Interpretation Mild Minimal or None -       Recent labs:  CBC:  Lab Results   Component  "Value Date    WBC 3.2 (L) 02/01/2023    RBC 3.69 (L) 02/01/2023    HGB 11.2 (L) 02/01/2023    HCT 34.6 (L) 02/01/2023    MCV 93.8 02/01/2023    MCH 30.4 02/01/2023    MCHC 32.4 (L) 02/01/2023    RDW 13.5 02/01/2023     02/01/2023    MPV 11.5 (H) 02/01/2023      CMP:  Sodium Level   Date Value Ref Range Status   02/01/2023 142 132 - 146 mmol/L Final     Potassium Level   Date Value Ref Range Status   02/01/2023 3.3 (L) 3.5 - 5.1 mmol/L Final     Carbon Dioxide   Date Value Ref Range Status   02/01/2023 28 23 - 31 mmol/L Final     Blood Urea Nitrogen   Date Value Ref Range Status   02/01/2023 33.3 (H) 9.8 - 20.1 mg/dL Final     Creatinine   Date Value Ref Range Status   02/01/2023 1.56 (H) 0.55 - 1.02 mg/dL Final     Calcium Level Total   Date Value Ref Range Status   02/01/2023 9.3 8.4 - 10.2 mg/dL Final     Albumin Level   Date Value Ref Range Status   02/01/2023 3.9 3.4 - 4.8 g/dL Final     Bilirubin Total   Date Value Ref Range Status   02/01/2023 0.3 <=1.5 mg/dL Final     Alkaline Phosphatase   Date Value Ref Range Status   02/01/2023 78 40 - 150 unit/L Final     Aspartate Aminotransferase   Date Value Ref Range Status   02/01/2023 15 5 - 34 unit/L Final     Alanine Aminotransferase   Date Value Ref Range Status   02/01/2023 6 0 - 55 unit/L Final     Estimated GFR-Non    Date Value Ref Range Status   06/25/2020 56  Final      BMP:  Lab Results   Component Value Date     02/01/2023    K 3.3 (L) 02/01/2023    CO2 28 02/01/2023    BUN 33.3 (H) 02/01/2023    CREATININE 1.56 (H) 02/01/2023    CALCIUM 9.3 02/01/2023    EGFRNONAA 56 06/25/2020      Lipid Panel:  Lab Results   Component Value Date    CHOL 174 02/01/2023     Lab Results   Component Value Date    HDL 46 02/01/2023     No results found for: "LDLCALC"  Lab Results   Component Value Date    TRIG 88 02/01/2023     No results found for: "CHOLHDL"   HbA1c:  Lab Results   Component Value Date    HGBA1C 5.2 02/01/2023      TSH:  Lab " Results   Component Value Date    TSH 0.719 02/01/2023       Recent Imaging:  X-Ray Chest PA And Lateral  EXAMINATION  XR Chest 1 View     INDICATION  Chest Pain     Comparison: None     FINDINGS  The cardiomediastinal silhouette is within normal limits. The lungs  are clear without focal consolidation. There is no pleural effusion or  definite pneumothorax. There is no acute bony abnormality identified.     IMPRESSION:  No acute abnormality of the chest.      Electronically Signed By: Adriana Jung MD  Date/Time Signed: 06/25/2020 10:26       Assessment & Plan:    Danielle Loera is presenting as above and will be treated as follows:    1. Severe dementia without behavioral disturbance, psychotic disturbance, mood disturbance, or anxiety, unspecified dementia type  - HH referral sent for resources at home   - Will reach out to Mrs. Latham to establish meeting for further resources     2. Weight loss  - Discussed diet and behavorial modifications   - Do not restrict diet, start nutritional supplements   - Continue to monitor weight loss  - We discussed that this may be a sequela of her dementia     3. Primary hypertension  - Continue current medication regimen     4. Stage 3b chronic kidney disease  - Comorbidity control   - Avoid NSAIDs and nephrotocxic medications   - Stay well hydrated     RTC 3 months or sooner if needed

## 2023-08-03 ENCOUNTER — TELEPHONE (OUTPATIENT)
Dept: FAMILY MEDICINE | Facility: CLINIC | Age: 88
End: 2023-08-03
Payer: MEDICARE

## 2023-08-03 NOTE — TELEPHONE ENCOUNTER
HH referral sent to [facility] and [facility], both refused do insurance.  Sent to [facility].

## 2023-08-04 NOTE — TELEPHONE ENCOUNTER
Informed per [facility] that they can not meet the patients needs so the referral was refused.

## 2023-08-19 ENCOUNTER — HOSPITAL ENCOUNTER (OUTPATIENT)
Facility: HOSPITAL | Age: 88
Discharge: NURSING, REHAB OR LONG-TERM CARE | End: 2023-08-30
Attending: EMERGENCY MEDICINE | Admitting: INTERNAL MEDICINE
Payer: MEDICARE

## 2023-08-19 DIAGNOSIS — M25.569 KNEE PAIN: ICD-10-CM

## 2023-08-19 DIAGNOSIS — R07.9 CHEST PAIN: ICD-10-CM

## 2023-08-19 DIAGNOSIS — I82.409 DVT (DEEP VENOUS THROMBOSIS): ICD-10-CM

## 2023-08-19 DIAGNOSIS — I10 PRIMARY HYPERTENSION: ICD-10-CM
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
## 2023-08-19 DIAGNOSIS — R55 SYNCOPE, UNSPECIFIED SYNCOPE TYPE: Primary | ICD-10-CM

## 2023-08-19 DIAGNOSIS — R55 SYNCOPE: ICD-10-CM

## 2023-08-19 LAB
ALBUMIN SERPL-MCNC: 3.7 G/DL (ref 3.4–4.8)
ALBUMIN/GLOB SERPL: 1.3 RATIO (ref 1.1–2)
ALP SERPL-CCNC: 59 UNIT/L (ref 40–150)
ALT SERPL-CCNC: 13 UNIT/L (ref 0–55)
AST SERPL-CCNC: 30 UNIT/L (ref 5–34)
BASOPHILS # BLD AUTO: 0.01 X10(3)/MCL
BASOPHILS NFR BLD AUTO: 0.2 %
BILIRUB SERPL-MCNC: 0.7 MG/DL
BUN SERPL-MCNC: 21.7 MG/DL (ref 9.8–20.1)
CALCIUM SERPL-MCNC: 9.1 MG/DL (ref 8.4–10.2)
CHLORIDE SERPL-SCNC: 103 MMOL/L (ref 98–111)
CO2 SERPL-SCNC: 27 MMOL/L (ref 23–31)
CREAT SERPL-MCNC: 1.51 MG/DL (ref 0.55–1.02)
EOSINOPHIL # BLD AUTO: 0.04 X10(3)/MCL (ref 0–0.9)
EOSINOPHIL NFR BLD AUTO: 0.7 %
ERYTHROCYTE [DISTWIDTH] IN BLOOD BY AUTOMATED COUNT: 14.1 % (ref 11.5–17)
GFR SERPLBLD CREATININE-BSD FMLA CKD-EPI: 32 MLS/MIN/1.73/M2
GLOBULIN SER-MCNC: 2.9 GM/DL (ref 2.4–3.5)
GLUCOSE SERPL-MCNC: 103 MG/DL (ref 75–121)
HCT VFR BLD AUTO: 33.5 % (ref 37–47)
HGB BLD-MCNC: 11.4 G/DL (ref 12–16)
IMM GRANULOCYTES # BLD AUTO: 0.03 X10(3)/MCL (ref 0–0.04)
IMM GRANULOCYTES NFR BLD AUTO: 0.5 %
LYMPHOCYTES # BLD AUTO: 0.73 X10(3)/MCL (ref 0.6–4.6)
LYMPHOCYTES NFR BLD AUTO: 12 %
MAGNESIUM SERPL-MCNC: 2 MG/DL (ref 1.6–2.6)
MCH RBC QN AUTO: 30.2 PG (ref 27–31)
MCHC RBC AUTO-ENTMCNC: 34 G/DL (ref 33–36)
MCV RBC AUTO: 88.6 FL (ref 80–94)
MONOCYTES # BLD AUTO: 0.24 X10(3)/MCL (ref 0.1–1.3)
MONOCYTES NFR BLD AUTO: 3.9 %
NEUTROPHILS # BLD AUTO: 5.03 X10(3)/MCL (ref 2.1–9.2)
NEUTROPHILS NFR BLD AUTO: 82.7 %
NRBC BLD AUTO-RTO: 0 %
PLATELET # BLD AUTO: 124 X10(3)/MCL (ref 130–400)
PMV BLD AUTO: 11.6 FL (ref 7.4–10.4)
POTASSIUM SERPL-SCNC: 3.2 MMOL/L (ref 3.5–5.1)
PROT SERPL-MCNC: 6.6 GM/DL (ref 5.8–7.6)
RBC # BLD AUTO: 3.78 X10(6)/MCL (ref 4.2–5.4)
SODIUM SERPL-SCNC: 141 MMOL/L (ref 132–146)
TROPONIN I SERPL-MCNC: 0.04 NG/ML (ref 0–0.04)
WBC # SPEC AUTO: 6.08 X10(3)/MCL (ref 4.5–11.5)

## 2023-08-19 PROCEDURE — 95816 EEG AWAKE AND DROWSY: CPT | Mod: 26,,, | Performed by: PSYCHIATRY & NEUROLOGY

## 2023-08-19 PROCEDURE — 83735 ASSAY OF MAGNESIUM: CPT

## 2023-08-19 PROCEDURE — 93010 EKG 12-LEAD: ICD-10-PCS | Mod: ,,, | Performed by: INTERNAL MEDICINE

## 2023-08-19 PROCEDURE — 95816 PR EEG,W/AWAKE & DROWSY RECORD: ICD-10-PCS | Mod: 26,,, | Performed by: PSYCHIATRY & NEUROLOGY

## 2023-08-19 PROCEDURE — 84484 ASSAY OF TROPONIN QUANT: CPT

## 2023-08-19 PROCEDURE — 85025 COMPLETE CBC W/AUTO DIFF WBC: CPT

## 2023-08-19 PROCEDURE — 80053 COMPREHEN METABOLIC PANEL: CPT

## 2023-08-19 PROCEDURE — 99285 EMERGENCY DEPT VISIT HI MDM: CPT | Mod: 25

## 2023-08-19 PROCEDURE — 93010 ELECTROCARDIOGRAM REPORT: CPT | Mod: ,,, | Performed by: INTERNAL MEDICINE

## 2023-08-19 PROCEDURE — 93005 ELECTROCARDIOGRAM TRACING: CPT

## 2023-08-20 LAB
ALBUMIN SERPL-MCNC: 3.8 G/DL (ref 3.4–4.8)
ALBUMIN/GLOB SERPL: 1.2 RATIO (ref 1.1–2)
ALP SERPL-CCNC: 62 UNIT/L (ref 40–150)
ALT SERPL-CCNC: 14 UNIT/L (ref 0–55)
AMPHET UR QL SCN: NEGATIVE
APPEARANCE UR: CLEAR
AST SERPL-CCNC: 30 UNIT/L (ref 5–34)
AV INDEX (PROSTH): 0.6
AV MEAN GRADIENT: 3 MMHG
AV PEAK GRADIENT: 6 MMHG
AV VALVE AREA BY VELOCITY RATIO: 1.69 CM²
AV VALVE AREA: 1.7 CM²
AV VELOCITY RATIO: 0.6
BACTERIA #/AREA URNS AUTO: NORMAL /HPF
BARBITURATE SCN PRESENT UR: NEGATIVE
BASOPHILS # BLD AUTO: 0.01 X10(3)/MCL
BASOPHILS NFR BLD AUTO: 0.2 %
BENZODIAZ UR QL SCN: NEGATIVE
BILIRUB SERPL-MCNC: 0.7 MG/DL
BILIRUB UR QL STRIP.AUTO: NEGATIVE
BUN SERPL-MCNC: 21.4 MG/DL (ref 9.8–20.1)
CALCIUM SERPL-MCNC: 9.2 MG/DL (ref 8.4–10.2)
CANNABINOIDS UR QL SCN: NEGATIVE
CHLORIDE SERPL-SCNC: 103 MMOL/L (ref 98–111)
CHOLEST SERPL-MCNC: 162 MG/DL
CHOLEST/HDLC SERPL: 3 {RATIO} (ref 0–5)
CO2 SERPL-SCNC: 26 MMOL/L (ref 23–31)
COCAINE UR QL SCN: NEGATIVE
COLOR UR: ABNORMAL
CREAT SERPL-MCNC: 1.45 MG/DL (ref 0.55–1.02)
CV ECHO LV RWT: 0.45 CM
DOP CALC AO PEAK VEL: 1.24 M/S
DOP CALC AO VTI: 26.8 CM
DOP CALC LVOT AREA: 2.8 CM2
DOP CALC LVOT DIAMETER: 1.9 CM
DOP CALC LVOT PEAK VEL: 0.74 M/S
DOP CALC LVOT STROKE VOLUME: 45.62 CM3
DOP CALC MV VTI: 20.1 CM
DOP CALCLVOT PEAK VEL VTI: 16.1 CM
E WAVE DECELERATION TIME: 219 MSEC
E/A RATIO: 0.53
E/E' RATIO: 9 M/S
ECHO LV POSTERIOR WALL: 1.05 CM (ref 0.6–1.1)
EOSINOPHIL # BLD AUTO: 0.06 X10(3)/MCL (ref 0–0.9)
EOSINOPHIL NFR BLD AUTO: 1.2 %
ERYTHROCYTE [DISTWIDTH] IN BLOOD BY AUTOMATED COUNT: 14 % (ref 11.5–17)
EST. AVERAGE GLUCOSE BLD GHB EST-MCNC: 93.9 MG/DL
FENTANYL UR QL SCN: NEGATIVE
FRACTIONAL SHORTENING: 21 % (ref 28–44)
GFR SERPLBLD CREATININE-BSD FMLA CKD-EPI: 34 MLS/MIN/1.73/M2
GLOBULIN SER-MCNC: 3.2 GM/DL (ref 2.4–3.5)
GLUCOSE SERPL-MCNC: 94 MG/DL (ref 75–121)
GLUCOSE UR QL STRIP.AUTO: NEGATIVE
HBA1C MFR BLD: 4.9 %
HCT VFR BLD AUTO: 35.9 % (ref 37–47)
HDLC SERPL-MCNC: 52 MG/DL (ref 35–60)
HGB BLD-MCNC: 12.1 G/DL (ref 12–16)
IMM GRANULOCYTES # BLD AUTO: 0.03 X10(3)/MCL (ref 0–0.04)
IMM GRANULOCYTES NFR BLD AUTO: 0.6 %
INTERVENTRICULAR SEPTUM: 1.08 CM (ref 0.6–1.1)
KETONES UR QL STRIP.AUTO: ABNORMAL
LDLC SERPL CALC-MCNC: 96 MG/DL (ref 50–140)
LEFT ATRIUM SIZE: 3.3 CM
LEFT ATRIUM VOLUME MOD: 62.3 CM3
LEFT CCA DIST DIAS: 7 CM/S
LEFT CCA DIST SYS: 43 CM/S
LEFT CCA PROX DIAS: 7 CM/S
LEFT CCA PROX SYS: 47 CM/S
LEFT ECA DIAS: 5 CM/S
LEFT ECA SYS: 69 CM/S
LEFT ICA DIST DIAS: 13 CM/S
LEFT ICA DIST SYS: 54 CM/S
LEFT ICA MID DIAS: 7 CM/S
LEFT ICA MID SYS: 37 CM/S
LEFT ICA PROX DIAS: 7 CM/S
LEFT ICA PROX SYS: 39 CM/S
LEFT INTERNAL DIMENSION IN SYSTOLE: 3.69 CM (ref 2.1–4)
LEFT VENTRICLE DIASTOLIC VOLUME: 101 ML
LEFT VENTRICLE SYSTOLIC VOLUME: 57.8 ML
LEFT VENTRICULAR INTERNAL DIMENSION IN DIASTOLE: 4.67 CM (ref 3.5–6)
LEFT VENTRICULAR MASS: 177.47 G
LEFT VERTEBRAL DIAS: 4 CM/S
LEFT VERTEBRAL SYS: 40 CM/S
LEUKOCYTE ESTERASE UR QL STRIP.AUTO: NEGATIVE
LV LATERAL E/E' RATIO: 7.71 M/S
LV SEPTAL E/E' RATIO: 10.8 M/S
LVOT MG: 1 MMHG
LVOT MV: 0.49 CM/S
LYMPHOCYTES # BLD AUTO: 0.76 X10(3)/MCL (ref 0.6–4.6)
LYMPHOCYTES NFR BLD AUTO: 15.1 %
MAGNESIUM SERPL-MCNC: 2.1 MG/DL (ref 1.6–2.6)
MCH RBC QN AUTO: 30 PG (ref 27–31)
MCHC RBC AUTO-ENTMCNC: 33.7 G/DL (ref 33–36)
MCV RBC AUTO: 89.1 FL (ref 80–94)
MDMA UR QL SCN: NEGATIVE
MONOCYTES # BLD AUTO: 0.18 X10(3)/MCL (ref 0.1–1.3)
MONOCYTES NFR BLD AUTO: 3.6 %
MV MEAN GRADIENT: 1 MMHG
MV PEAK A VEL: 1.02 M/S
MV PEAK E VEL: 0.54 M/S
MV PEAK GRADIENT: 4 MMHG
MV STENOSIS PRESSURE HALF TIME: 63 MS
MV VALVE AREA BY CONTINUITY EQUATION: 2.27 CM2
MV VALVE AREA P 1/2 METHOD: 3.49 CM2
NEUTROPHILS # BLD AUTO: 3.99 X10(3)/MCL (ref 2.1–9.2)
NEUTROPHILS NFR BLD AUTO: 79.3 %
NITRITE UR QL STRIP.AUTO: NEGATIVE
NRBC BLD AUTO-RTO: 0 %
OHS CV CAROTID RIGHT ICA EDV HIGHEST: 16
OHS CV CAROTID ULTRASOUND LEFT ICA/CCA RATIO: 1.26
OHS CV CAROTID ULTRASOUND RIGHT ICA/CCA RATIO: 2
OHS CV PV CAROTID LEFT HIGHEST CCA: 47
OHS CV PV CAROTID LEFT HIGHEST ICA: 54
OHS CV PV CAROTID RIGHT HIGHEST CCA: 49
OHS CV PV CAROTID RIGHT HIGHEST ICA: 88
OHS CV US CAROTID LEFT HIGHEST EDV: 13
OHS LV EJECTION FRACTION SIMPSONS BIPLANE MOD: 43 %
OPIATES UR QL SCN: NEGATIVE
PCP UR QL: NEGATIVE
PH UR STRIP.AUTO: 5.5 [PH]
PH UR: 6 [PH] (ref 3–11)
PHOSPHATE SERPL-MCNC: 2.5 MG/DL (ref 2.3–4.7)
PISA TR MAX VEL: 1.63 M/S
PLATELET # BLD AUTO: 116 X10(3)/MCL (ref 130–400)
PMV BLD AUTO: 11.4 FL (ref 7.4–10.4)
POTASSIUM SERPL-SCNC: 3.3 MMOL/L (ref 3.5–5.1)
PROT SERPL-MCNC: 7 GM/DL (ref 5.8–7.6)
PROT UR QL STRIP.AUTO: ABNORMAL
PV PEAK GRADIENT: 2 MMHG
PV PEAK VELOCITY: 0.75 M/S
RBC # BLD AUTO: 4.03 X10(6)/MCL (ref 4.2–5.4)
RBC UR QL AUTO: NEGATIVE
RIGHT CCA DIST DIAS: 5 CM/S
RIGHT CCA DIST SYS: 44 CM/S
RIGHT CCA PROX DIAS: 4 CM/S
RIGHT CCA PROX SYS: 49 CM/S
RIGHT ECA DIAS: 0 CM/S
RIGHT ECA SYS: 55 CM/S
RIGHT ICA DIST DIAS: 16 CM/S
RIGHT ICA DIST SYS: 88 CM/S
RIGHT ICA MID DIAS: 10 CM/S
RIGHT ICA MID SYS: 50 CM/S
RIGHT ICA PROX DIAS: 6 CM/S
RIGHT ICA PROX SYS: 45 CM/S
RIGHT VENTRICULAR END-DIASTOLIC DIMENSION: 2.05 CM
RIGHT VERTEBRAL DIAS: 0 CM/S
RIGHT VERTEBRAL SYS: 31 CM/S
SODIUM SERPL-SCNC: 141 MMOL/L (ref 132–146)
SP GR UR STRIP.AUTO: 1.01 (ref 1–1.03)
SQUAMOUS #/AREA URNS AUTO: <5 /HPF
TDI LATERAL: 0.07 M/S
TDI SEPTAL: 0.05 M/S
TDI: 0.06 M/S
TR MAX PG: 11 MMHG
TRICUSPID ANNULAR PLANE SYSTOLIC EXCURSION: 2.17 CM
TRIGL SERPL-MCNC: 71 MG/DL (ref 37–140)
TROPONIN I SERPL-MCNC: 0.03 NG/ML (ref 0–0.04)
UROBILINOGEN UR STRIP-ACNC: 1
VLDLC SERPL CALC-MCNC: 14 MG/DL
WBC # SPEC AUTO: 5.03 X10(3)/MCL (ref 4.5–11.5)
WBC #/AREA URNS AUTO: <5 /HPF

## 2023-08-20 PROCEDURE — 80061 LIPID PANEL: CPT | Performed by: PHYSICIAN ASSISTANT

## 2023-08-20 PROCEDURE — 96374 THER/PROPH/DIAG INJ IV PUSH: CPT

## 2023-08-20 PROCEDURE — 96375 TX/PRO/DX INJ NEW DRUG ADDON: CPT

## 2023-08-20 PROCEDURE — 96372 THER/PROPH/DIAG INJ SC/IM: CPT | Performed by: NURSE PRACTITIONER

## 2023-08-20 PROCEDURE — 96361 HYDRATE IV INFUSION ADD-ON: CPT

## 2023-08-20 PROCEDURE — G0378 HOSPITAL OBSERVATION PER HR: HCPCS

## 2023-08-20 PROCEDURE — 85025 COMPLETE CBC W/AUTO DIFF WBC: CPT | Performed by: NURSE PRACTITIONER

## 2023-08-20 PROCEDURE — 63600175 PHARM REV CODE 636 W HCPCS: Performed by: STUDENT IN AN ORGANIZED HEALTH CARE EDUCATION/TRAINING PROGRAM

## 2023-08-20 PROCEDURE — 63600175 PHARM REV CODE 636 W HCPCS: Performed by: EMERGENCY MEDICINE

## 2023-08-20 PROCEDURE — 84100 ASSAY OF PHOSPHORUS: CPT | Performed by: NURSE PRACTITIONER

## 2023-08-20 PROCEDURE — 83036 HEMOGLOBIN GLYCOSYLATED A1C: CPT | Performed by: PHYSICIAN ASSISTANT

## 2023-08-20 PROCEDURE — 81001 URINALYSIS AUTO W/SCOPE: CPT

## 2023-08-20 PROCEDURE — 80307 DRUG TEST PRSMV CHEM ANLYZR: CPT | Performed by: STUDENT IN AN ORGANIZED HEALTH CARE EDUCATION/TRAINING PROGRAM

## 2023-08-20 PROCEDURE — 63600175 PHARM REV CODE 636 W HCPCS: Performed by: NURSE PRACTITIONER

## 2023-08-20 PROCEDURE — 63600175 PHARM REV CODE 636 W HCPCS: Performed by: PHYSICIAN ASSISTANT

## 2023-08-20 PROCEDURE — 96376 TX/PRO/DX INJ SAME DRUG ADON: CPT

## 2023-08-20 PROCEDURE — 80053 COMPREHEN METABOLIC PANEL: CPT | Performed by: NURSE PRACTITIONER

## 2023-08-20 PROCEDURE — 83735 ASSAY OF MAGNESIUM: CPT | Performed by: NURSE PRACTITIONER

## 2023-08-20 PROCEDURE — 84484 ASSAY OF TROPONIN QUANT: CPT | Performed by: NURSE PRACTITIONER

## 2023-08-20 RX ORDER — LORAZEPAM 2 MG/ML
2 INJECTION INTRAMUSCULAR ONCE
Status: COMPLETED | OUTPATIENT
Start: 2023-08-20 | End: 2023-08-20

## 2023-08-20 RX ORDER — ENOXAPARIN SODIUM 100 MG/ML
40 INJECTION SUBCUTANEOUS EVERY 24 HOURS
Status: DISCONTINUED | OUTPATIENT
Start: 2023-08-20 | End: 2023-08-20

## 2023-08-20 RX ORDER — LORAZEPAM 2 MG/ML
1 INJECTION INTRAMUSCULAR EVERY 4 HOURS PRN
Status: DISCONTINUED | OUTPATIENT
Start: 2023-08-20 | End: 2023-08-30 | Stop reason: HOSPADM

## 2023-08-20 RX ORDER — ENOXAPARIN SODIUM 100 MG/ML
30 INJECTION SUBCUTANEOUS EVERY 24 HOURS
Status: DISCONTINUED | OUTPATIENT
Start: 2023-08-21 | End: 2023-08-28

## 2023-08-20 RX ORDER — POLYETHYLENE GLYCOL 3350 17 G/17G
17 POWDER, FOR SOLUTION ORAL 2 TIMES DAILY PRN
Status: DISCONTINUED | OUTPATIENT
Start: 2023-08-20 | End: 2023-08-30 | Stop reason: HOSPADM

## 2023-08-20 RX ORDER — SIMETHICONE 80 MG
1 TABLET,CHEWABLE ORAL 4 TIMES DAILY PRN
Status: DISCONTINUED | OUTPATIENT
Start: 2023-08-20 | End: 2023-08-30 | Stop reason: HOSPADM

## 2023-08-20 RX ORDER — DIPHENHYDRAMINE HYDROCHLORIDE 50 MG/ML
25 INJECTION INTRAMUSCULAR; INTRAVENOUS EVERY 6 HOURS PRN
Status: DISCONTINUED | OUTPATIENT
Start: 2023-08-20 | End: 2023-08-30 | Stop reason: HOSPADM

## 2023-08-20 RX ORDER — PROCHLORPERAZINE EDISYLATE 5 MG/ML
5 INJECTION INTRAMUSCULAR; INTRAVENOUS EVERY 6 HOURS PRN
Status: DISCONTINUED | OUTPATIENT
Start: 2023-08-20 | End: 2023-08-30 | Stop reason: HOSPADM

## 2023-08-20 RX ORDER — SODIUM CHLORIDE, SODIUM LACTATE, POTASSIUM CHLORIDE, CALCIUM CHLORIDE 600; 310; 30; 20 MG/100ML; MG/100ML; MG/100ML; MG/100ML
INJECTION, SOLUTION INTRAVENOUS CONTINUOUS
Status: DISCONTINUED | OUTPATIENT
Start: 2023-08-20 | End: 2023-08-28

## 2023-08-20 RX ORDER — TALC
6 POWDER (GRAM) TOPICAL NIGHTLY PRN
Status: DISCONTINUED | OUTPATIENT
Start: 2023-08-20 | End: 2023-08-30 | Stop reason: HOSPADM

## 2023-08-20 RX ORDER — MAG HYDROX/ALUMINUM HYD/SIMETH 200-200-20
30 SUSPENSION, ORAL (FINAL DOSE FORM) ORAL 4 TIMES DAILY PRN
Status: DISCONTINUED | OUTPATIENT
Start: 2023-08-20 | End: 2023-08-30 | Stop reason: HOSPADM

## 2023-08-20 RX ORDER — POTASSIUM CHLORIDE 14.9 MG/ML
20 INJECTION INTRAVENOUS
Status: COMPLETED | OUTPATIENT
Start: 2023-08-20 | End: 2023-08-20

## 2023-08-20 RX ORDER — ACETAMINOPHEN 325 MG/1
650 TABLET ORAL EVERY 6 HOURS PRN
Status: DISCONTINUED | OUTPATIENT
Start: 2023-08-20 | End: 2023-08-30 | Stop reason: HOSPADM

## 2023-08-20 RX ORDER — ONDANSETRON 2 MG/ML
4 INJECTION INTRAMUSCULAR; INTRAVENOUS EVERY 4 HOURS PRN
Status: DISCONTINUED | OUTPATIENT
Start: 2023-08-20 | End: 2023-08-30 | Stop reason: HOSPADM

## 2023-08-20 RX ORDER — NALOXONE HCL 0.4 MG/ML
0.02 VIAL (ML) INJECTION
Status: DISCONTINUED | OUTPATIENT
Start: 2023-08-20 | End: 2023-08-30 | Stop reason: HOSPADM

## 2023-08-20 RX ADMIN — ENOXAPARIN SODIUM 40 MG: 40 INJECTION SUBCUTANEOUS at 05:08

## 2023-08-20 RX ADMIN — LORAZEPAM 1 MG: 2 INJECTION INTRAMUSCULAR; INTRAVENOUS at 08:08

## 2023-08-20 RX ADMIN — DIPHENHYDRAMINE HYDROCHLORIDE 25 MG: 50 INJECTION INTRAMUSCULAR; INTRAVENOUS at 11:08

## 2023-08-20 RX ADMIN — POTASSIUM CHLORIDE 20 MEQ: 14.9 INJECTION, SOLUTION INTRAVENOUS at 04:08

## 2023-08-20 RX ADMIN — LORAZEPAM 2 MG: 2 INJECTION INTRAMUSCULAR; INTRAVENOUS at 01:08

## 2023-08-20 RX ADMIN — SODIUM CHLORIDE, POTASSIUM CHLORIDE, SODIUM LACTATE AND CALCIUM CHLORIDE: 600; 310; 30; 20 INJECTION, SOLUTION INTRAVENOUS at 04:08

## 2023-08-20 NOTE — ED PROVIDER NOTES
Encounter Date: 8/19/2023       History     Chief Complaint   Patient presents with    Fall     Patient found on ground about 0830 this morning, c/o left knee and hip pain, hx of Dementia, denies thinners, patient ambulated into triage     This is a 89-year-old female who is ordinarily in very good health she says she only has a history of hypertension she lives alone and walks unassisted takes care of all of her ADLs.  The patient does have a history of dementia and has family that checks on her daily.  Patient experienced a fall this evening she is unclear of the circumstances around the fall she does not remember falling but she did wake with some pain to her knees.  She denies any chest pain or shortness of breath.      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  No family history on file.  Social History     Tobacco Use    Smoking status: Never     Passive exposure: Never    Smokeless tobacco: Never   Substance Use Topics    Alcohol use: Never    Drug use: Never     Review of Systems   Constitutional:  Negative for chills, diaphoresis, fatigue and fever.   HENT:  Negative for congestion and trouble swallowing.    Eyes:  Negative for pain and discharge.   Respiratory:  Negative for cough and wheezing.    Cardiovascular:  Negative for chest pain and leg swelling.   Gastrointestinal:  Negative for abdominal pain, diarrhea, nausea and vomiting.   Genitourinary:  Negative for dysuria, flank pain, vaginal bleeding and vaginal discharge.   Musculoskeletal:         Pain in knees   Skin:  Negative for color change.   Neurological:  Negative for syncope, speech difficulty and weakness.    Psychiatric/Behavioral:  Negative for agitation and confusion.    All other systems reviewed and are negative.      Physical Exam     Initial Vitals [08/19/23 2142]   BP Pulse Resp Temp SpO2   132/71 80 18 98.3 °F (36.8 °C) 96 %      MAP       --         Physical Exam    HENT:   Head: Normocephalic.   Eyes: EOM are normal. Left eye exhibits no discharge. No scleral icterus.   Cardiovascular:  Regular rhythm.           Pulmonary/Chest: No stridor. No respiratory distress.   Abdominal: She exhibits no distension.   Musculoskeletal:         General: Normal range of motion.      Comments: Patient has full range of motion of the hips and knees bilaterally there is bruising to the knees bilaterally.     Neurological: She is alert and oriented to person, place, and time. She has normal strength.   Skin: Skin is dry. No rash noted. No erythema. No pallor.   Psychiatric: She has a normal mood and affect. Her behavior is normal. Judgment and thought content normal.         ED Course   Procedures  Labs Reviewed   COMPREHENSIVE METABOLIC PANEL - Abnormal; Notable for the following components:       Result Value    Potassium Level 3.2 (*)     Blood Urea Nitrogen 21.7 (*)     Creatinine 1.51 (*)     All other components within normal limits   URINALYSIS, REFLEX TO URINE CULTURE - Abnormal; Notable for the following components:    Protein, UA Trace (*)     Ketones, UA Trace (*)     All other components within normal limits   CBC WITH DIFFERENTIAL - Abnormal; Notable for the following components:    RBC 3.78 (*)     Hgb 11.4 (*)     Hct 33.5 (*)     Platelet 124 (*)     MPV 11.6 (*)     All other components within normal limits   TROPONIN I - Normal   MAGNESIUM - Normal   CBC W/ AUTO DIFFERENTIAL    Narrative:     The following orders were created for panel order CBC Auto Differential.  Procedure                               Abnormality         Status                     ---------                               -----------          ------                     CBC with Differential[[id number]]        Abnormal            Final result                 Please view results for these tests on the individual orders.   URINALYSIS, MICROSCOPIC     EKG Readings: (Independently Interpreted)   EKG per ER physician interpretation is rate 83 sinus arrhythmia of the right bundle branch block time was 10:43 p.m. no STEMI normal QT interval       Imaging Results              X-Ray Hip 2 or 3 views Left (with Pelvis when performed) (In process)                      X-Ray Knee Complete 4 or More Views Left (In process)                      CT Cervical Spine Without Contrast (Preliminary result)  Result time 08/19/23 22:13:19      Preliminary result by Ismael Payne MD (08/19/23 22:13:19)                   Narrative:    START OF REPORT:  Technique: CT of the cervical spine was performed without intravenous contrast with axial as well as sagittal and coronal images.    Comparison: None.    Dosage Information: Automated exposure control was utilized.    Clinical history: Fall.    Findings:  Position: Supine.  Artifact: None.  Lung apices: The visualized lung apices appear unremarkable.  Spine:  Spinal canal: The spinal canal appears unremarkable.  Spinal cord: The spinal cord appears unremarkable.  Mineralization: Within normal limits.  Rotation: No significant rotation is seen.  Scoliosis: No significant scoliosis is seen.  Vertebral Fusion: No vertebral fusion is identified.  Listhesis: There is grade I degenerative anterolisthesis C4 on C5.  Lordosis: Moderate degenerative straightening of the cervical lordosis is seen. This may be positional or reflect an element of myospasm.  Intervertebral disc spaces: Multilevel loss of disc height is seen.  Osteophytes: Multilevel endplate osteophytes are seen.  Uncovertebral degenerative changes: Multilevel uncovertebral joint arthrosis is seen.  Facet degenerative changes: Multilevel facet degenerative changes are  seen.  Calcifications: None.  Fractures: No acute cervical spine fracture dislocation or subluxation is seen.  Orthopedic Hardware: None.    Miscellaneous:  Mastoid air cells: The visualized mastoid air cells appear clear.  Soft Tissues: Unremarkable.      Impression:  1. No acute cervical spine fracture dislocation or subluxation is seen.  2. Degenerative changes and other details as above.                                         CT Head Without Contrast (Preliminary result)  Result time 08/19/23 22:11:54      Preliminary result by Ismael Payne MD (08/19/23 22:11:54)                   Narrative:    START OF REPORT:  Technique: CT of the head was performed without intravenous contrast with axial as well as coronal and sagittal images.    Comparison: None.    Dosage Information: Automated exposure control was utilized.    Clinical history: Fall.    Findings:  Hemorrhage: No acute intracranial hemorrhage is seen.  CSF spaces: The ventricles, sulci and basal cisterns all appear moderately prominent consistent with global cerebral atrophy.  Brain parenchyma: There is preservation of the grey white junction throughout. No acute infarct is identified.  Cerebellum: Unremarkable.  Vascular: Unremarkable venous sinuses.  Sella and skull base: The sella appears to be within normal limits for age.  Cerebellopontine angles: Within normal limits.  Herniation: None.  Intracranial calcifications: Incidental note is made of bilateral choroid plexus calcification. Incidental note is made of some pineal region calcification.  Calvarium: No acute linear or depressed skull fracture is seen.    Maxillofacial Structures:  Paranasal sinuses: There is some mucoperiosteal thickening left sphenoid sinus. The rest of the paranasal sinuses appear clear.  Orbits: The orbits appear unremarkable.  Zygomatic arches: The zygomatic arches are intact and unremarkable.  Temporal bones and mastoids: The temporal bones and mastoids appear  unremarkable.  TMJ: The mandibular condyles appear normally placed with respect to the mandibular fossa.      Impression:  1. No acute intracranial traumatic injury identified. Details and other findings as noted above.                                         Medications - No data to display  Medical Decision Making  Syncope versus mechanical fall patient can not remember what happened 89-year-old female fall at home.  CT scan of the head is negative x-rays are negative EKG nondiagnostic lab work is unremarkable will opt for possible syncope.    Differential includes but is not limited to new fracture hit her neck trauma syncope ACS urinary tract infection dehydration    Amount and/or Complexity of Data Reviewed  Independent Historian:      Details: History is obtained from both the son and the patient  External Data Reviewed: labs, radiology and ECG.  Labs: ordered. Decision-making details documented in ED Course.  Radiology: ordered. Decision-making details documented in ED Course.  ECG/medicine tests: ordered. Decision-making details documented in ED Course.  Discussion of management or test interpretation with external provider(s): Discussed home health and DME equipment with son and patient                               Clinical Impression:   Final diagnoses:  [M25.569] Knee pain  [W19.XXXA] Fall  [R55] Syncope, unspecified syncope type (Primary)        ED Disposition Condition    Observation Stable                Felipe Clarke MD  08/20/23 4904

## 2023-08-20 NOTE — H&P
[facility] Hospital Medicine History & Physical Examination       Patient Name: Danielle Loera  MRN: [id number]  Patient Class: OP- Observation   Admission Date: 8/19/2023   Admitting Physician: Nestor Morel MD   Length of Stay: 0  Attending Physician: Mariana Perez MD  Primary Care Provider: Samra Alfredo MD  Face-to-Face encounter date: 08/20/2023  Code Status: Full Code  Chief Complaint: Fall (Patient found on ground about 0830 this morning, c/o left knee and hip pain, hx of Dementia, denies thinners, patient ambulated into triage)        Patient information was obtained from patient, patient's family, past medical records and ER records.     HISTORY OF PRESENT ILLNESS:   Danielle Loera is a 89 y.o. Black or  female with a past medical history of hypertension, dementia, CKD stage IIIA and vitamin-D deficiency. The patient presented to [facility] on 8/19/2023 following a and unwitnessed fall.  On exam patient reports having shortness a breath at times but denies complaints of headache, vision changes, chest pain, abdominal pain, nausea, vomiting, diarrhea and burning with urination.  She states she does not remember the fall and therefore unable to give further details. At baseline patient lives alone, walks independently and completes activities of daily living on her own.    Upon presentation to the ED, temperature 98.3° F, heart rate 80, blood pressure 132/71, respiratory rate 18 and SpO2 96% on room air.  Labs with H&H 11.4/33.5, potassium 3.2, BUN/creatinine 21.7/1.51, troponin 0.039.  UA negative for infection.  EKG sinus rhythm with marked sinus arrhythmia and right bundle-branch block.  X-ray of the left hip and left knee without any acute osseous findings.  Preliminary read of CT of the head revealed no acute intracranial traumatic injury.  Preliminary read CT of cervical spine with no acute cervical spine fracture, dislocation or subluxation  but degenerative changes.  In the ED patient received 20 mEq of IV potassium chloride.  Patient is admitted to hospital medicine services for further medical management.    PAST MEDICAL HISTORY:   Dementia   Hypertension  CKD stage IIIA   Vitamin-D deficiency    PAST SURGICAL HISTORY:   Reviewed and negative    ALLERGIES:   Patient has no known allergies.    FAMILY HISTORY:   Reviewed and negative    SOCIAL HISTORY:   Denies tobacco, alcohol or illicit drug use    HOME MEDICATIONS:     Prior to Admission medications    Medication Sig Start Date End Date Taking? Authorizing Provider   aspirin 81 MG Chew Take 81 mg by mouth once daily.    Provider, Historical   benazepriL (LOTENSIN) 20 MG tablet Take 1 tablet (20 mg total) by mouth once daily. 7/26/23   Samra Alfredo MD       REVIEW OF SYSTEMS:   Except as documented, all other systems reviewed and negative     PHYSICAL EXAM:     VITAL SIGNS: 24 HRS MIN & MAX LAST   Temp  Min: 98.3 °F (36.8 °C)  Max: 98.3 °F (36.8 °C) 98.3 °F (36.8 °C)   BP  Min: 121/85  Max: 173/78 121/85   Pulse  Min: 66  Max: 82  82   Resp  Min: 15  Max: 19 15   SpO2  Min: 96 %  Max: 99 % 99 %       General appearance: Well-developed, well-nourished female in no apparent distress.  Son at bedside towards end of exam.  HEENT: Atraumatic head. Moist mucous membranes of oral cavity.  Lungs: Clear to auscultation bilaterally anteriorly.   Heart: Regular rate and rhythm.  Trace pitting edema bilateral lower extremities.  Abdomen: Soft, non-distended.   Extremities: No cyanosis, clubbing. No deformities.  Skin: No Rash. Warm and dry.  Neuro: Awake, alert and oriented to person, place and city.  Not oriented to year and month. Motor and sensory exams grossly intact.  Psych/mental status: Appropriate mood and affect. Cooperative. Responds appropriately to questions.       LABS AND IMAGING:     Recent Labs   Lab 08/19/23  2314 08/20/23  0432   WBC 6.08 5.03   RBC 3.78* 4.03*   HGB 11.4* 12.1   HCT  33.5* 35.9*   MCV 88.6 89.1   MCH 30.2 30.0   MCHC 34.0 33.7   RDW 14.1 14.0   * 116*   MPV 11.6* 11.4*       Recent Labs   Lab 08/19/23  2314 08/20/23  0432    141   K 3.2* 3.3*   CO2 27 26   BUN 21.7* 21.4*   CREATININE 1.51* 1.45*   CALCIUM 9.1 9.2   MG 2.00 2.10   ALBUMIN 3.7 3.8   ALKPHOS 59 62   ALT 13 14   AST 30 30   BILITOT 0.7 0.7       Microbiology Results (last 7 days)       ** No results found for the last 168 hours. **             X-Ray Hip 2 or 3 views Left (with Pelvis when performed)  Narrative: EXAMINATION:  Three views pelvis and left hip    CLINICAL HISTORY:  Fall    COMPARISON:  None    FINDINGS:  Bones are demineralized.  No acute fracture or dislocation.  Impression: No acute osseous findings    Electronically signed by: Dylan Gutierrez MD  Date:    08/20/2023  Time:    09:44  X-Ray Knee Complete 4 or More Views Left  Narrative: EXAMINATION:  Four views left knee    CLINICAL HISTORY:  Pain    COMPARISON:  None    FINDINGS:  Bones are demineralized.  There is chondrocalcinosis of the menisci.  No fracture, dislocation or joint effusion.  Impression: No acute osseous findings    Electronically signed by: Dylan Gutierrez MD  Date:    08/20/2023  Time:    09:23        ASSESSMENT & PLAN:   Assessment:  Unwitnessed fall, ? Syncope  Hypokalemia   SUBHASH on CKD stage 3a  Normocytic anemia   History of hypertension, dementia and vitamin-D deficiency     Plan:  - Syncope workup initiated:  > Orthostatic vitals  > Echo - ordered  > Carotid US - Right ICA 50-69% stenosis, left ICA less than 50 % stenosis, bilateral vertebral arteries patent with antegrade flow  > Lipid Panel - ordered  > Telemetry monitoring  - IVF hydration   - Potassium has been replaced. Continue to monitor   - Physical and occupational therapy   - Resume appropriate home medications when deemed necessary   - Labs in AM      VTE Prophylaxis: will be placed on Lovenox for DVT prophylaxis and will be advised to be as mobile as  possible and sit in a chair as tolerated      __________________________________________________________________________  INPATIENT LIST OF MEDICATIONS     Current Facility-Administered Medications:     acetaminophen tablet 650 mg, 650 mg, Oral, Q6H PRN, Josephine Cleary AGACNP-BC    aluminum-magnesium hydroxide-simethicone 200-200-20 mg/5 mL suspension 30 mL, 30 mL, Oral, QID PRN, Josephine Cleary AGACNP-BC    enoxaparin injection 40 mg, 40 mg, Subcutaneous, Daily, Josephine Cleary AGACNP-BC    lactated ringers infusion, , Intravenous, Continuous, Josephine Cleary AGACNP-BC, Last Rate: 75 mL/hr at 08/20/23 0419, New Bag at 08/20/23 0419    melatonin tablet 6 mg, 6 mg, Oral, Nightly PRN, Josephine Cleary AGACNHERMELNIDO-BC    naloxone 0.4 mg/mL injection 0.02 mg, 0.02 mg, Intravenous, PRN, Josephine Cleary AGACNP-BC    ondansetron injection 4 mg, 4 mg, Intravenous, Q4H PRN, Josephine Cleary AGACNP-BC    polyethylene glycol packet 17 g, 17 g, Oral, BID PRN, Josephine Cleary AGACNP-BC    prochlorperazine injection Soln 5 mg, 5 mg, Intravenous, Q6H PRN, Josephine Cleary AGACNP-BC    simethicone chewable tablet 80 mg, 1 tablet, Oral, QID PRN, Josephine Cleary AGACNP-BC    Current Outpatient Medications:     aspirin 81 MG Chew, Take 81 mg by mouth once daily., Disp: , Rfl:     benazepriL (LOTENSIN) 20 MG tablet, Take 1 tablet (20 mg total) by mouth once daily., Disp: 90 tablet, Rfl: 1      Scheduled Meds:   enoxparin  40 mg Subcutaneous Daily     Continuous Infusions:   lactated ringers 75 mL/hr at 08/20/23 0419     PRN Meds:.acetaminophen, aluminum-magnesium hydroxide-simethicone, melatonin, naloxone, ondansetron, polyethylene glycol, prochlorperazine, simethicone      Discharge Planning and Disposition: Anticipated discharge to be determined.    Madhavi VANCE PA, have reviewed and discussed the case with Dr. Mariana Perez.    Please see the following addendum for further assessment and plan from there attending  MD.    Madhavi Morel PA-C  08/20/2023

## 2023-08-20 NOTE — PROGRESS NOTES
Pharmacist Renal Dose Adjustment Note    Danielle Loera is a 89 y.o. female being treated with the medication lovenox    Patient Data:    Vital Signs (Most Recent):  Temp: 98.3 °F (36.8 °C) (08/19/23 2142)  Pulse: 68 (08/20/23 1451)  Resp: 15 (08/20/23 0420)  BP: (!) 171/83 (08/20/23 1451)  SpO2: 99 % (08/20/23 1451) Vital Signs (72h Range):  Temp:  [98.3 °F (36.8 °C)]   Pulse:  []   Resp:  [15-19]   BP: (121-173)/()   SpO2:  [96 %-99 %]      Recent Labs   Lab 08/19/23 2314 08/20/23 0432   CREATININE 1.51* 1.45*     Serum creatinine: 1.45 mg/dL (H) 08/20/23 0432  Estimated creatinine clearance: 23.6 mL/min (A)    Medication: 40 mg QD changed to 30 mg QD    Pharmacist's Name: Warner Schwab  Pharmacist's Extension: [phone]

## 2023-08-21 PROBLEM — R55 SYNCOPE: Status: ACTIVE | Noted: 2023-08-21

## 2023-08-21 PROCEDURE — 96361 HYDRATE IV INFUSION ADD-ON: CPT

## 2023-08-21 PROCEDURE — 96376 TX/PRO/DX INJ SAME DRUG ADON: CPT

## 2023-08-21 PROCEDURE — 63600175 PHARM REV CODE 636 W HCPCS: Performed by: PHYSICIAN ASSISTANT

## 2023-08-21 PROCEDURE — 63600175 PHARM REV CODE 636 W HCPCS

## 2023-08-21 PROCEDURE — G0378 HOSPITAL OBSERVATION PER HR: HCPCS

## 2023-08-21 PROCEDURE — 63600175 PHARM REV CODE 636 W HCPCS: Performed by: STUDENT IN AN ORGANIZED HEALTH CARE EDUCATION/TRAINING PROGRAM

## 2023-08-21 PROCEDURE — 96375 TX/PRO/DX INJ NEW DRUG ADDON: CPT

## 2023-08-21 PROCEDURE — 96374 THER/PROPH/DIAG INJ IV PUSH: CPT | Mod: 59

## 2023-08-21 PROCEDURE — 96372 THER/PROPH/DIAG INJ SC/IM: CPT | Performed by: INTERNAL MEDICINE

## 2023-08-21 PROCEDURE — 63600175 PHARM REV CODE 636 W HCPCS: Performed by: INTERNAL MEDICINE

## 2023-08-21 PROCEDURE — 95819 EEG AWAKE AND ASLEEP: CPT

## 2023-08-21 PROCEDURE — 63600175 PHARM REV CODE 636 W HCPCS: Performed by: NURSE PRACTITIONER

## 2023-08-21 RX ORDER — HYDRALAZINE HYDROCHLORIDE 20 MG/ML
10 INJECTION INTRAMUSCULAR; INTRAVENOUS EVERY 6 HOURS PRN
Status: DISCONTINUED | OUTPATIENT
Start: 2023-08-22 | End: 2023-08-30 | Stop reason: HOSPADM

## 2023-08-21 RX ORDER — LABETALOL HYDROCHLORIDE 5 MG/ML
10 INJECTION, SOLUTION INTRAVENOUS ONCE
Status: COMPLETED | OUTPATIENT
Start: 2023-08-21 | End: 2023-08-21

## 2023-08-21 RX ORDER — LORAZEPAM 2 MG/ML
INJECTION INTRAMUSCULAR
Status: COMPLETED
Start: 2023-08-21 | End: 2023-08-21

## 2023-08-21 RX ADMIN — SODIUM CHLORIDE, POTASSIUM CHLORIDE, SODIUM LACTATE AND CALCIUM CHLORIDE: 600; 310; 30; 20 INJECTION, SOLUTION INTRAVENOUS at 04:08

## 2023-08-21 RX ADMIN — DIPHENHYDRAMINE HYDROCHLORIDE 25 MG: 50 INJECTION INTRAMUSCULAR; INTRAVENOUS at 08:08

## 2023-08-21 RX ADMIN — LABETALOL HYDROCHLORIDE 10 MG: 5 INJECTION, SOLUTION INTRAVENOUS at 02:08

## 2023-08-21 RX ADMIN — ENOXAPARIN SODIUM 30 MG: 30 INJECTION SUBCUTANEOUS at 04:08

## 2023-08-21 RX ADMIN — HYDRALAZINE HYDROCHLORIDE 10 MG: 20 INJECTION INTRAMUSCULAR; INTRAVENOUS at 11:08

## 2023-08-21 RX ADMIN — LORAZEPAM 1 MG: 2 INJECTION INTRAMUSCULAR; INTRAVENOUS at 09:08

## 2023-08-21 RX ADMIN — LORAZEPAM: 2 INJECTION INTRAMUSCULAR; INTRAVENOUS at 02:08

## 2023-08-21 NOTE — PT/OT/SLP PROGRESS
Occupational Therapy      Patient Name:  Danielle Loera   MRN:  [id number]    Patient not seen today secondary to Nurse/ DM hold. Upon OT entry, RN administering ativan 2/2 AMS. Will follow-up when appropriate.    8/21/2023

## 2023-08-21 NOTE — PROGRESS NOTES
[facility]  Hospital Medicine Progress Note      Chief Complaint: fall     HPI:   89 y.o. Black or  female with a past medical history of hypertension, dementia, CKD stage IIIA and vitamin-D deficiency. The patient presented to [facility] on 8/19/2023 following a and unwitnessed fall.  On exam patient reports having shortness a breath at times but denies complaints of headache, vision changes, chest pain, abdominal pain, nausea, vomiting, diarrhea and burning with urination.  She states she does not remember the fall and therefore unable to give further details. At baseline patient lives alone, walks independently and completes activities of daily living on her own.     Upon presentation to the ED, temperature 98.3° F, heart rate 80, blood pressure 132/71, respiratory rate 18 and SpO2 96% on room air.  Labs with H&H 11.4/33.5, potassium 3.2, BUN/creatinine 21.7/1.51, troponin 0.039.  UA negative for infection.  EKG sinus rhythm with marked sinus arrhythmia and right bundle-branch block.  X-ray of the left hip and left knee without any acute osseous findings.  Preliminary read of CT of the head revealed no acute intracranial traumatic injury.  Preliminary read CT of cervical spine with no acute cervical spine fracture, dislocation or subluxation but degenerative changes.  In the ED patient received 20 mEq of IV potassium chloride.  Patient is admitted to hospital medicine services for further medical management.      Patient presents for a fall without associated symptoms.   Noted to have a history of hypertension   Labs reviewed, she is slightly hypokalemic and and renal function appears to be at or near baseline (Cr in Feb 1.56, today it is 1.45). Otherwise, labs are unremarkable.       Son is present at bedside states that she lives at home alone but there are cameras all over home and he lives 2 minutes away.  There are several siblings that help take care for.    He  states that on Friday she was in her normal state of health but on Saturday when he went home it seemed as if she had fallen on the floor, when he went there she was awake and alert attempting to get up.    This morning patient is combative completely disoriented and difficult to redirect.  CT of the head shows no acute abnormalities.    Family would like NH placement.     __________________________________________________________________________________________________________________________________  Objective/physical exam:  General: Appears comfortable, no acute distress.  Integumentary: Warm, dry, intact.  Neuro: drowsy(from ativan)   Arousable but disoriented does not follow commands, no significant verbal response appreciated     PurWic in place   Musculoskeletal: Purposeful movement noted.   Respiratory: No accessory muscle use. Breath sounds are equal.  Cardiovascular: Regular rate. No peripheral edema.       VITAL SIGNS: 24 HRS MIN & MAX LAST   Temp  Min: 97.4 °F (36.3 °C)  Max: 97.4 °F (36.3 °C) 97.4 °F (36.3 °C)   BP  Min: 131/98  Max: 192/84 (!) 166/93   Pulse  Min: 69  Max: 114  97   Resp  Min: 14  Max: 20 19   SpO2  Min: 94 %  Max: 98 % 98 %     MRI Brain Without Contrast  Narrative: EXAMINATION:  MRI BRAIN WITHOUT CONTRAST    CLINICAL HISTORY:  Mental status change, unknown cause;    TECHNIQUE:  Multiplanar MR imaging of the brain was performed without contrast.    COMPARISON:  CT 08/19/2023    FINDINGS:  No acute infarct, mass effect or hemorrhage.  White matter FLAIR hyperintensities are nonspecific but likely related to small vessel ischemic disease.  Ventricles and sulci are proportionate.    No abnormal extra-axial fluid collections.    Flow voids are maintained in the intracranial arteries and dural venous sinuses.    Skull base and calvarium have a normal signal.  Impression: No acute findings in the brain.    Findings are compatible with the preliminary report.    Electronically signed  by: Dylan Gutierrez MD  Date:    08/21/2023  Time:    09:10    Recent Labs   Lab 08/19/23 2314 08/20/23 0432   WBC 6.08 5.03   RBC 3.78* 4.03*   HGB 11.4* 12.1   HCT 33.5* 35.9*   MCV 88.6 89.1   MCH 30.2 30.0   MCHC 34.0 33.7   RDW 14.1 14.0   * 116*   MPV 11.6* 11.4*       Recent Labs   Lab 08/19/23 2314 08/20/23 0432    141   K 3.2* 3.3*   CO2 27 26   BUN 21.7* 21.4*   CREATININE 1.51* 1.45*   CALCIUM 9.1 9.2   MG 2.00 2.10   ALBUMIN 3.7 3.8   ALKPHOS 59 62   ALT 13 14   AST 30 30   BILITOT 0.7 0.7          Microbiology Results (last 7 days)       ** No results found for the last 168 hours. **             See below for Radiology    Scheduled Med:   enoxparin  30 mg Subcutaneous Daily        Continuous Infusions:   lactated ringers 75 mL/hr at 08/21/23 0459        PRN Meds:  acetaminophen, aluminum-magnesium hydroxide-simethicone, diphenhydrAMINE, lorazepam, melatonin, naloxone, ondansetron, polyethylene glycol, prochlorperazine, simethicone     ___________________________________________________________________________________________________________________  Assessment/Plan:  Unwitnessed fall etiology unknown   Hypokalemia   SUBHASH on CKD stage 3a  Normocytic anemia   History of hypertension, dementia and vitamin-D deficiency     _______________________________________________________________________________________________________________________________  Presents for fall, etiology unclear.  No evidence of infectious etiology.   CT and MRI without acute abnormality.   This is completely different from patient's baseline according to family.    PT/OT consulted.   Continue to monitor vitals q.4 hours   Neuro checks q.4 hours.  Continue telemetry  Maintain IV access, provide gentle IV hydration   Provide supplemental oxygen if needed for oxygen saturation less than 90-92%   Monitor blood glucose and avoid hypo/hyperglycemia, sliding scale insulin as needed  UDS, urine toxicity, blood alcohol, liver  function    Monitor for seizures-seizure precautions and fall precautions.    Appropriate prevention to be continued/medical optimization-aspirin statin therapy  Early rehabilitation with speech, PT and OT as deemed appropriate/necessary  NPO until appropriate speech evaluation to assess for aspiration risk    I have spent >30 minutes on the day of the visit; time spent includes face to face time and non-face to face time preparing to see the patient (eg, review of tests), independently reviewing and interpreting medical records, both past and current; documenting clinical information in the electronic or other health record, and communicating results to the patient/family/caregiver and care coordinator and nursing team.       Anticipated discharge and Disposition when medically stable:  Pending.     All diagnosis and differential diagnosis have been reviewed,  interpreted and communicated appropriately to care team. assessment and plan has been documented; I have personally reviewed the labs and test results that are presently available and pertinent to this hospital course; I have reviewed medical records based upon their availability.    All of the patient's questions have been  addressed and answered. Patient's is agreeable to the above stated plan.   I will continue to monitor closely and make adjustments to medical management as needed.      Mariana Perez,    08/21/2023        This note was created with the assistance of Dragon voice recognition software. There may be transcription errors as a result of using this technology however minimal. Effort has been made to assure accuracy of transcription but any obvious errors or omissions should be clarified with the author of the document.

## 2023-08-21 NOTE — PT/OT/SLP PROGRESS
Physical Therapy    Missed Treatment Session    Patient Name:  Danielle Loera   MRN:  [id number]      Patient not seen at this time secondary to pt unavailable (EEG), but pt also with AMS requiring Ativan 2x's this AM. Nurse reports not appropriate for evaluation this date.

## 2023-08-21 NOTE — PLAN OF CARE
Problem: Adult Inpatient Plan of Care  Goal: Plan of Care Review  Outcome: Ongoing, Progressing  Goal: Patient-Specific Goal (Individualized)  Outcome: Ongoing, Not Progressing  Goal: Absence of Hospital-Acquired Illness or Injury  Outcome: Ongoing, Not Progressing  Goal: Optimal Comfort and Wellbeing  Outcome: Ongoing, Not Progressing  Goal: Readiness for Transition of Care  Outcome: Ongoing, Not Progressing

## 2023-08-21 NOTE — NURSING
Nurses Note -- 4 Eyes      8/21/2023   6:15 PM      Skin assessed during: Admit      [x] No Altered Skin Integrity Present    []Prevention Measures Documented      [] Yes- Altered Skin Integrity Present or Discovered   [] LDA Added if Not in Epic (Describe Wound)   [] New Altered Skin Integrity was Present on Admit and Documented in LDA   [] Wound Image Taken    Wound Care Consulted? No    Attending Nurse:  Carolina Eric RN/Staff Member:   GINETTE Dixon

## 2023-08-21 NOTE — PLAN OF CARE
Called patient's son he is looking at facilities. He will let me know which facility he is interested in.

## 2023-08-21 NOTE — PLAN OF CARE
Pt in EEG so assessment conducted with son, Soy Loera [phone]. He voiced interest in NH placement. He stated that patient would probably not be open to placement but is demented the majority of the time. Pt is  with 4 biological and one adopted child, son Soy is POA and pt has living will. Discussed possibly needing competency eval and consult to CM for NH placement. He inquired about NH placement and financials. Advised that facility will determine such once referral is completed. Messaged Greyson Banerjee for orders if in agreement with plan.    08/21/23 2497   Discharge Assessment   Assessment Type Discharge Planning Assessment   Confirmed/corrected address, phone number and insurance Yes   Confirmed Demographics Correct on Facesheet   Source of Information family   When was your last doctors appointment?   (PCP at [facility]-Dr. Martinez)   Communicated ALEJANDRO with patient/caregiver Date not available/Unable to determine   People in Home alone  (children rotate staying with pt)   Prior to hospitilization cognitive status: Unable to Assess   Current cognitive status: Unable to Assess   Walking or Climbing Stairs ambulation difficulty, requires equipment   Mobility Management wheelchair, rollator, standard walker, straight cane   Dressing/Bathing bathing difficulty, requires equipment   Dressing/Bathing Management shower chair   Home Accessibility wheelchair accessible;stairs within home   Number of Stairs, Within Home, Primary three   Home Layout Able to live on 1st floor   Equipment Currently Used at Home wheelchair;walker, standard;rollator;shower chair;blood pressure machine   Readmission within 30 days? No   Patient currently being followed by outpatient case management? No   Do you currently have service(s) that help you manage your care at home? No   Do you take prescription medications? Yes  (fills with [facility] on [address])   Do you have prescription coverage? Yes   Coverage [employer]   Do you  have any problems affording any of your prescribed medications? No   Is the patient taking medications as prescribed? yes   Who is going to help you get home at discharge? son   How do you get to doctors appointments? family or friend will provide   Are you on dialysis? No   Do you take coumadin? No   DME Needed Upon Discharge    (TBD)   Discharge Plan discussed with: Patient   Transition of Care Barriers None   Discharge Plan A Other  (TBD- family interested in NH placement)

## 2023-08-22 PROCEDURE — 97162 PT EVAL MOD COMPLEX 30 MIN: CPT

## 2023-08-22 PROCEDURE — 96361 HYDRATE IV INFUSION ADD-ON: CPT

## 2023-08-22 PROCEDURE — 63600175 PHARM REV CODE 636 W HCPCS: Performed by: NURSE PRACTITIONER

## 2023-08-22 PROCEDURE — 25000003 PHARM REV CODE 250: Performed by: STUDENT IN AN ORGANIZED HEALTH CARE EDUCATION/TRAINING PROGRAM

## 2023-08-22 PROCEDURE — 97166 OT EVAL MOD COMPLEX 45 MIN: CPT

## 2023-08-22 PROCEDURE — 63600175 PHARM REV CODE 636 W HCPCS: Performed by: STUDENT IN AN ORGANIZED HEALTH CARE EDUCATION/TRAINING PROGRAM

## 2023-08-22 PROCEDURE — 96376 TX/PRO/DX INJ SAME DRUG ADON: CPT

## 2023-08-22 PROCEDURE — G0378 HOSPITAL OBSERVATION PER HR: HCPCS

## 2023-08-22 PROCEDURE — 97535 SELF CARE MNGMENT TRAINING: CPT

## 2023-08-22 PROCEDURE — 63600175 PHARM REV CODE 636 W HCPCS: Performed by: INTERNAL MEDICINE

## 2023-08-22 PROCEDURE — 96372 THER/PROPH/DIAG INJ SC/IM: CPT | Performed by: INTERNAL MEDICINE

## 2023-08-22 RX ORDER — LISINOPRIL 10 MG/1
10 TABLET ORAL DAILY
Status: DISCONTINUED | OUTPATIENT
Start: 2023-08-22 | End: 2023-08-25

## 2023-08-22 RX ORDER — NAPROXEN SODIUM 220 MG/1
81 TABLET, FILM COATED ORAL DAILY
Status: DISCONTINUED | OUTPATIENT
Start: 2023-08-22 | End: 2023-08-30 | Stop reason: HOSPADM

## 2023-08-22 RX ADMIN — ENOXAPARIN SODIUM 30 MG: 30 INJECTION SUBCUTANEOUS at 04:08

## 2023-08-22 RX ADMIN — LISINOPRIL 10 MG: 10 TABLET ORAL at 09:08

## 2023-08-22 RX ADMIN — SODIUM CHLORIDE, POTASSIUM CHLORIDE, SODIUM LACTATE AND CALCIUM CHLORIDE: 600; 310; 30; 20 INJECTION, SOLUTION INTRAVENOUS at 09:08

## 2023-08-22 RX ADMIN — DIPHENHYDRAMINE HYDROCHLORIDE 25 MG: 50 INJECTION INTRAMUSCULAR; INTRAVENOUS at 09:08

## 2023-08-22 RX ADMIN — ASPIRIN 81 MG CHEWABLE TABLET 81 MG: 81 TABLET CHEWABLE at 09:08

## 2023-08-22 RX ADMIN — DIPHENHYDRAMINE HYDROCHLORIDE 25 MG: 50 INJECTION INTRAMUSCULAR; INTRAVENOUS at 01:08

## 2023-08-22 NOTE — PROCEDURES
EEG,w/awake & asleep record    Date/Time: 8/19/2023 9:44 PM    Performed by: Tamara Metzger MD  Authorized by: Mariana Perez DO      Reason for exam: seizure    Technical description:  A standard digital EEG was performed at [facility].  The 10 20 international system of electrode placement is used.  Standard montages were recorded.  Activation procedures were performed.    Description:  No posterior dominant rhythm was identified.  The record was dominated by admixed polymorphic theta and delta activity. Muscle artifact obscured most of the record. stage 2 sleep was not identified.  There were no electrographic seizures or epileptiform discharges.    Impression:  This is an abnormal EEG due to mild generalized slowing.  Generalized slowing can be seen with generalized cerebral dysfunction as seen in metabolic, toxic, infectious, or multifocal structural abnormalities.   
Home

## 2023-08-22 NOTE — PT/OT/SLP EVAL
Occupational Therapy  Evaluation    Name: Danielle Loera  MRN: [id number]  Admitting Diagnosis: Unwitnessed fall, ?Syncope, Hypokalemia, SUBHASH on CKD stage 3a, Normocytic anemia. History of hypertension, dementia and vitamin-D deficiency   Recent Surgery: * No surgery found *      Recommendations:     Discharge Recommendations: nursing facility, skilled  Discharge Equipment Recommendations: TBD, pending progress  Barriers to discharge: medical dx    Assessment:     Danielle Loera is a 89 y.o. female with a medical diagnosis of Unwitnessed fall, ?Syncope, Hypokalemia, SUBHASH on CKD stage 3a, Normocytic anemia. History of hypertension, dementia and vitamin-D deficiency. She presents with fatigue and difficulty following simple commands. Performance deficits affecting function: weakness, impaired endurance, impaired self care skills, impaired functional mobility, gait instability, impaired balance, impaired cognition, decreased upper extremity function, decreased lower extremity function, decreased safety awareness, decreased ROM.      Rehab Prognosis: Fair; patient would benefit from acute skilled OT services to address these deficits and reach maximum level of function.       Plan:     Patient to be seen 5 x/week to address the above listed problems via self-care/home management, therapeutic activities, therapeutic exercises  Plan of Care Expires: 09/05/23  Plan of Care Reviewed with: patient. Note: Pt's son present upon arrival. However, he left pt's room not long afterwards.    Subjective     Chief Complaint: None stated.  Family comments: Pt's sons reported that pt was found on floor last Saturday.    Occupational Profile:  Addendum: OT was able to gather pt's hx and PLOF from pt's family on 8/23.   Living Environment: Lives alone in [facility] with 3 DANNIELLE and R HR. Owns tub/shower.  Previous level of function: Required assist with bathing task, but pt was independent with remainder of ADLs. Pt required dep A with IADLs,  and was not driving.  DME: Pt owns RW, rollator, SC, and shower chair per sons.  Assistance upon Discharge: Pt's family reported that they check on her throughout the day and night. Pt's son lives 2 minutes away. However, pt would benefit from placement post-d/c to increase pt's functional independence and decrease burden of care.    Patients cultural, spiritual, Christian conflicts given the current situation: no    Objective:     Communicated with: RN prior to session.  Patient found HOB elevated with PureWick, telemetry, peripheral IV, bed alarm (rollbelt and B mitts) upon OT entry to room.    General Precautions: Standard, fall  Respiratory Status: Room air    Occupational Performance:    Bed Mobility:    Pt t/f from lying with HOB elevated to seated EOB with max A x2 for safety; increased time; and verbal, visual, and tactile cues provided.    Functional Mobility/Transfers:  Patient completed multiple Sit <> Stand Transfers from EOB with min-mod A x2 for safety with rolling walker.  Patient completed Bed > Chair Transfer using Step Transfer technique with min A x2 for safety with rolling walker.    Activities of Daily Living:  Lower Body Dressing: Dep A to don B socks.  Toileting: Max A to perform task. Pt able to perform keyon-care while standing using RW, but required assist to manage diaper. Note: Pt had bladder accident while standing during eval.    Cognitive/Visual Perceptual:  Cognitive/Psychosocial Skills:     -       Oriented to: Able to recall name and year of birth.   -       Follows Commands/attention: Difficulty following one-step commands  -       Safety awareness/insight to disability: impaired   Note: Pt with h/o dementia.    Physical Exam:  Upper Extremity Range of Motion:     -       BUEs: Pt demonstrated increased difficulty following one-step commands in order to formally assess AROM. Pt only demonstrated minimal AROM of L shoulder flex. PROM of BUEs WFL. Increased tone noted with PROM of R  shoulder flex.  Upper Extremity Strength:    -       BUEs: Unable to formally assess 2/2 pt's increased difficulty following simple commands.    Therapeutic Positioning  Risk for acquired pressure injuries is increased due to impaired mobility and incontinence.    OT interventions performed during the course of today's session in an effort to prevent and/or reduce acquired pressure injuries:   Therapeutic positioning completed  - pillow positioned underneath pt, and geomat ordered.    Skin assessment:    Findings:  Altered skin noted on R elbow    OT recommendations for therapeutic positioning throughout hospitalization:   Follow [facility] Pressure Injury Prevention Protocol  Geomat recommended for sacral protection while [facility]  Specialty mattress    Patient left up in chair with all lines intact, call button in reach, chair alarm on, RN notified, pillow underneath pt (geomat ordered), and [place] vest donned.    GOALS:   Multidisciplinary Problems       Occupational Therapy Goals          Problem: Occupational Therapy    Goal Priority Disciplines Outcome Interventions   Occupational Therapy Goal     OT, PT/OT Ongoing, Progressing    Description: Goals to be met by: 9/5/23     Patient will increase functional independence with ADLs by performing:    UE Dressing with Stand-by Assistance.  LE Dressing with Minimal Assistance and Assistive Devices as needed.  Grooming while standing at sink with Stand-by Assistance.  Toileting from toilet with Minimal Assistance for hygiene and clothing management.                          History:     Past Medical History:   Diagnosis Date    Anemia due to stage 3a chronic kidney disease 5/12/2023    Primary hypertension 5/12/2023    Severe dementia without behavioral disturbance, psychotic disturbance, mood disturbance, or anxiety 2/23/2023    Vitamin D deficiency 5/12/2023       No past surgical history on file.    Time Tracking:     OT Date of Treatment: 8/22/23  OT Start Time: 1033  OT  Stop Time: 1116  OT Total Time (min): 43 min    Billable Minutes: Evaluation Mod complexity 33 mins  Self Care/Home Management 10 mins    8/22/2023

## 2023-08-22 NOTE — PLAN OF CARE
Problem: Physical Therapy  Goal: Physical Therapy Goal  Description: Goals to be met by: 23     Patient will increase functional independence with mobility by performin. Supine to sit with Contact Guard Assistance  2. Sit to stand transfer with Contact Guard Assistance  3. Bed to chair transfer with Contact Guard Assistance using Rolling Walker  4. Gait  x 300 feet with Contact Guard Assistance using Rolling Walker.     Outcome: Ongoing, Progressing

## 2023-08-22 NOTE — PT/OT/SLP EVAL
Physical Therapy Evaluation    Patient Name:  Danielle Loera   MRN:  [id number]    Recommendations:     Discharge Recommendations: nursing facility, skilled   Discharge Equipment Recommendations: to be determined by next level of care   Barriers to discharge: Decreased caregiver support, Impaired mobility, and Ongoing medical needs    Assessment:     Danielle Loera is a 89 y.o. female admitted with a medical diagnosis of Unwitnessed fall, Syncope, Hypokalemia, SUBHASH on CKD stage 3a, Normocytic anemia, history of hypertension, dementia and vitamin-D deficiency.  She presents with the following impairments/functional limitations: weakness, impaired endurance, impaired self care skills, impaired functional mobility, gait instability, impaired balance, impaired cognition, decreased upper extremity function, decreased ROM, decreased safety awareness. Pt very lethargic throughout evaluation, responded to verbal commands, but required constant cuing and passive facilitation to achieve mobility. Upon return to supine in bed pt immediately returned to sleeping. Due to severity of deficits and decreased availability of caregiver, pt will require placement upon discharge.    Rehab Prognosis: Fair; patient would benefit from acute skilled PT services to address these deficits and reach maximum level of function.    Recent Surgery: * No surgery found *      Plan:     During this hospitalization, patient to be seen 5 x/week to address the identified rehab impairments via gait training, therapeutic activities, therapeutic exercises and progress toward the following goals:    Plan of Care Expires:  09/22/23    Subjective     Chief Complaint: pt with no complaints  Patient/Family Comments/goals: pt unable to report, no family at bedside  Pain/Comfort:  Pain Rating 1:  (Pt with no complaints of pain)    Patients cultural, spiritual, Denominational conflicts given the current situation: no    Living Environment:  Pt unable to provide  information. Per chart review pt lived at home alone and was monitored by family via cameras and monitors.  Prior to admission, patients level of function was unknown.  Equipment used at home: wheelchair, walker, standard, rollator, shower chair, blood pressure machine.  DME owned (not currently used):  unknown .  Upon discharge, patient will have assistance from facility staff.    Objective:     Communicated with nurse prior to session.  Patient found up in chair with bed alarm, PureWick, peripheral IV, restraints  upon PT entry to room.    General Precautions: Standard, fall  Orthopedic Precautions:    Braces: N/A  Respiratory Status: Room air      Exams:  Cognitive Exam:  Patient is oriented to Person  RLE ROM: WFL  RLE Strength: WFL  LLE ROM: WFL  LLE Strength: WFL  Skin integrity: Visible skin intact      Functional Mobility:  Bed Mobility:     Sit to Supine: total assistance  Transfers:     Sit to Stand:  maximal assistance with rolling walker  Gait: 5ft to bed with RW max A. Pt unable to initiate or maintain steps without passive advancement of RW, short step length with poor foot clearance.  Balance: Supported stand with RW, mod A with posterior lean      AM-PAC 6 CLICK MOBILITY  Total Score:11       Patient provided with verbal education regarding PT POC.  Understanding was verbalized, however additional teaching warranted.     Patient left HOB elevated with all lines intact, call button in reach, bed alarm on, restraints reapplied at end of session, and nurse notified.    GOALS:   Multidisciplinary Problems       Physical Therapy Goals          Problem: Physical Therapy    Goal Priority Disciplines Outcome Goal Variances Interventions   Physical Therapy Goal     PT, PT/OT Ongoing, Progressing     Description: Goals to be met by: 23     Patient will increase functional independence with mobility by performin. Supine to sit with Contact Guard Assistance  2. Sit to stand transfer with Contact  Guard Assistance  3. Bed to chair transfer with Contact Guard Assistance using Rolling Walker  4. Gait  x 300 feet with Contact Guard Assistance using Rolling Walker.                          History:     Past Medical History:   Diagnosis Date    Anemia due to stage 3a chronic kidney disease 5/12/2023    Primary hypertension 5/12/2023    Severe dementia without behavioral disturbance, psychotic disturbance, mood disturbance, or anxiety 2/23/2023    Vitamin D deficiency 5/12/2023       No past surgical history on file.    Time Tracking:     PT Received On: 08/22/23  PT Start Time: 1425     PT Stop Time: 1441  PT Total Time (min): 16 min     Billable Minutes: Evaluation mod      08/22/2023

## 2023-08-22 NOTE — PROGRESS NOTES
[facility]  Hospital Medicine Progress Note      Chief Complaint: fall     HPI:   89 y.o. Black or  female with a past medical history of hypertension, dementia, CKD stage IIIA and vitamin-D deficiency. The patient presented to [facility] on 8/19/2023 following a and unwitnessed fall.  On exam patient reports having shortness a breath at times but denies complaints of headache, vision changes, chest pain, abdominal pain, nausea, vomiting, diarrhea and burning with urination.  She states she does not remember the fall and therefore unable to give further details. At baseline patient lives alone, walks independently and completes activities of daily living on her own.     Upon presentation to the ED, temperature 98.3° F, heart rate 80, blood pressure 132/71, respiratory rate 18 and SpO2 96% on room air.  Labs with H&H 11.4/33.5, potassium 3.2, BUN/creatinine 21.7/1.51, troponin 0.039.  UA negative for infection.  EKG sinus rhythm with marked sinus arrhythmia and right bundle-branch block.  X-ray of the left hip and left knee without any acute osseous findings.  Preliminary read of CT of the head revealed no acute intracranial traumatic injury.  Preliminary read CT of cervical spine with no acute cervical spine fracture, dislocation or subluxation but degenerative changes.  In the ED patient received 20 mEq of IV potassium chloride.  Patient is admitted to hospital medicine services for further medical management.      Patient presents for a fall without associated symptoms.   Noted to have a history of hypertension   Labs reviewed, she is slightly hypokalemic and and renal function appears to be at or near baseline (Cr in Feb 1.56, today it is 1.45). Otherwise, labs are unremarkable.       Son is present at bedside states that she lives at home alone but there are cameras all over home and he lives 2 minutes away.  There are several siblings that help take care for.    He  states that on Friday she was in her normal state of health but on Saturday when he went home it seemed as if she had fallen on the floor, when he went there she was awake and alert attempting to get up.    This morning patient is combative completely disoriented and difficult to redirect.  CT of the head shows no acute abnormalities.    Family would like NH placement.     Interval history  Spoke to granddaughter (present in the room) and son Hilda via telephone; according to family, patient was in her normal state of health prior to this episode, and this has never happened to her before.   UDS negative; labs and imaging thus far are significantly unremarkable.  She is very drowsy at this time but did receive Ativan.  __________________________________________________________________________________________________________________________________  Objective/physical exam:  General: Appears comfortable, no acute distress.  Integumentary: Warm, dry, intact.  Neuro: drowsy(from ativan)   Arousable but disoriented does not follow commands, no significant verbal response appreciated     PurWic in place   Musculoskeletal: Purposeful movement noted.   Respiratory: No accessory muscle use. Breath sounds are equal.  Cardiovascular: Regular rate. No peripheral edema.       VITAL SIGNS: 24 HRS MIN & MAX LAST   Temp  Min: 97.7 °F (36.5 °C)  Max: 99.1 °F (37.3 °C) 97.7 °F (36.5 °C)   BP  Min: 132/85  Max: 158/101 132/85   Pulse  Min: 91  Max: 106  97   Resp  Min: 18  Max: 18 18   SpO2  Min: 94 %  Max: 96 % 95 %     MRI Brain Without Contrast  Narrative: EXAMINATION:  MRI BRAIN WITHOUT CONTRAST    CLINICAL HISTORY:  Mental status change, unknown cause;    TECHNIQUE:  Multiplanar MR imaging of the brain was performed without contrast.    COMPARISON:  CT 08/19/2023    FINDINGS:  No acute infarct, mass effect or hemorrhage.  White matter FLAIR hyperintensities are nonspecific but likely related to small vessel ischemic disease.   Ventricles and sulci are proportionate.    No abnormal extra-axial fluid collections.    Flow voids are maintained in the intracranial arteries and dural venous sinuses.    Skull base and calvarium have a normal signal.  Impression: No acute findings in the brain.    Findings are compatible with the preliminary report.    Electronically signed by: Dylan Gutierrez MD  Date:    08/21/2023  Time:    09:10    Recent Labs   Lab 08/19/23 2314 08/20/23 0432   WBC 6.08 5.03   RBC 3.78* 4.03*   HGB 11.4* 12.1   HCT 33.5* 35.9*   MCV 88.6 89.1   MCH 30.2 30.0   MCHC 34.0 33.7   RDW 14.1 14.0   * 116*   MPV 11.6* 11.4*       Recent Labs   Lab 08/19/23 2314 08/20/23 0432    141   K 3.2* 3.3*   CO2 27 26   BUN 21.7* 21.4*   CREATININE 1.51* 1.45*   CALCIUM 9.1 9.2   MG 2.00 2.10   ALBUMIN 3.7 3.8   ALKPHOS 59 62   ALT 13 14   AST 30 30   BILITOT 0.7 0.7          Microbiology Results (last 7 days)       ** No results found for the last 168 hours. **             See below for Radiology    Scheduled Med:   aspirin  81 mg Oral Daily    enoxparin  30 mg Subcutaneous Daily    lisinopriL  10 mg Oral Daily        Continuous Infusions:   lactated ringers 75 mL/hr at 08/22/23 0954        PRN Meds:  acetaminophen, aluminum-magnesium hydroxide-simethicone, diphenhydrAMINE, hydrALAZINE, lorazepam, melatonin, naloxone, ondansetron, polyethylene glycol, prochlorperazine, simethicone     ___________________________________________________________________________________________________________________  Assessment/Plan:  Unwitnessed fall etiology unknown   Hypokalemia   SUBHASH on CKD stage 3a  Normocytic anemia   History of hypertension, dementia and vitamin-D deficiency     _______________________________________________________________________________________________________________________________    Presents for fall, etiology unclear.  No evidence of infectious etiology. CT and MRI without acute abnormality.   PT/OT consulted.    Will discontinue Ativan in setting significant lethargy.    Check ABG.    Continue to monitor vitals q.4 hours   Neuro checks q.4 hours.  Continue telemetry  Monitor blood glucose and avoid hypo/hyperglycemia, sliding scale insulin as needed  Monitor for seizures-seizure precautions and fall precautions.    Appropriate prevention to be continued/medical optimization-aspirin statin therapy  Early rehabilitation with speech, PT and OT as deemed appropriate/necessary  Continue diet as tolerated.     I have spent >30 minutes on the day of the visit; time spent includes face to face time and non-face to face time preparing to see the patient (eg, review of tests), independently reviewing and interpreting medical records, both past and current; documenting clinical information in the electronic or other health record, and communicating results to the patient/family/caregiver and care coordinator and nursing team.     Anticipated discharge and Disposition when medically stable:  Pending.     All diagnosis and differential diagnosis have been reviewed,  interpreted and communicated appropriately to care team. assessment and plan has been documented; I have personally reviewed the labs and test results that are presently available and pertinent to this hospital course; I have reviewed medical records based upon their availability.  I will continue to monitor closely and make adjustments to medical management as needed.      Mariana Perez, DO   08/22/2023        This note was created with the assistance of Dragon voice recognition software. There may be transcription errors as a result of using this technology however minimal. Effort has been made to assure accuracy of transcription but any obvious errors or omissions should be clarified with the author of the document.

## 2023-08-22 NOTE — PLAN OF CARE
CM stated that I print out [facility] choice I brought list into pt's room awaiting a NH selection.

## 2023-08-22 NOTE — PLAN OF CARE
Problem: Occupational Therapy  Goal: Occupational Therapy Goal  Description: Goals to be met by: 9/5/23     Patient will increase functional independence with ADLs by performing:    UE Dressing with Stand-by Assistance.  LE Dressing with Minimal Assistance and Assistive Devices as needed.  Grooming while standing at sink with Stand-by Assistance.  Toileting from toilet with Minimal Assistance for hygiene and clothing management.     Outcome: Ongoing, Progressing

## 2023-08-23 PROCEDURE — 63600175 PHARM REV CODE 636 W HCPCS: Performed by: INTERNAL MEDICINE

## 2023-08-23 PROCEDURE — 97530 THERAPEUTIC ACTIVITIES: CPT | Mod: CQ

## 2023-08-23 PROCEDURE — 96376 TX/PRO/DX INJ SAME DRUG ADON: CPT

## 2023-08-23 PROCEDURE — 63600175 PHARM REV CODE 636 W HCPCS: Performed by: NURSE PRACTITIONER

## 2023-08-23 PROCEDURE — 96372 THER/PROPH/DIAG INJ SC/IM: CPT | Performed by: INTERNAL MEDICINE

## 2023-08-23 PROCEDURE — 25000003 PHARM REV CODE 250: Performed by: STUDENT IN AN ORGANIZED HEALTH CARE EDUCATION/TRAINING PROGRAM

## 2023-08-23 PROCEDURE — 96374 THER/PROPH/DIAG INJ IV PUSH: CPT | Mod: 59

## 2023-08-23 PROCEDURE — 25000003 PHARM REV CODE 250: Performed by: NURSE PRACTITIONER

## 2023-08-23 PROCEDURE — 97535 SELF CARE MNGMENT TRAINING: CPT

## 2023-08-23 PROCEDURE — 97116 GAIT TRAINING THERAPY: CPT | Mod: CQ

## 2023-08-23 PROCEDURE — 96361 HYDRATE IV INFUSION ADD-ON: CPT

## 2023-08-23 PROCEDURE — 63600175 PHARM REV CODE 636 W HCPCS: Performed by: STUDENT IN AN ORGANIZED HEALTH CARE EDUCATION/TRAINING PROGRAM

## 2023-08-23 PROCEDURE — G0378 HOSPITAL OBSERVATION PER HR: HCPCS

## 2023-08-23 PROCEDURE — 25000003 PHARM REV CODE 250: Performed by: INTERNAL MEDICINE

## 2023-08-23 PROCEDURE — 63600175 PHARM REV CODE 636 W HCPCS: Performed by: PHYSICIAN ASSISTANT

## 2023-08-23 RX ORDER — QUETIAPINE FUMARATE 25 MG/1
25 TABLET, FILM COATED ORAL NIGHTLY
Status: DISCONTINUED | OUTPATIENT
Start: 2023-08-23 | End: 2023-08-30 | Stop reason: HOSPADM

## 2023-08-23 RX ADMIN — ASPIRIN 81 MG CHEWABLE TABLET 81 MG: 81 TABLET CHEWABLE at 09:08

## 2023-08-23 RX ADMIN — LISINOPRIL 10 MG: 10 TABLET ORAL at 09:08

## 2023-08-23 RX ADMIN — SODIUM CHLORIDE, POTASSIUM CHLORIDE, SODIUM LACTATE AND CALCIUM CHLORIDE: 600; 310; 30; 20 INJECTION, SOLUTION INTRAVENOUS at 12:08

## 2023-08-23 RX ADMIN — LORAZEPAM 1 MG: 2 INJECTION INTRAMUSCULAR; INTRAVENOUS at 01:08

## 2023-08-23 RX ADMIN — DIPHENHYDRAMINE HYDROCHLORIDE 25 MG: 50 INJECTION INTRAMUSCULAR; INTRAVENOUS at 09:08

## 2023-08-23 RX ADMIN — SODIUM CHLORIDE, POTASSIUM CHLORIDE, SODIUM LACTATE AND CALCIUM CHLORIDE: 600; 310; 30; 20 INJECTION, SOLUTION INTRAVENOUS at 03:08

## 2023-08-23 RX ADMIN — DIPHENHYDRAMINE HYDROCHLORIDE 25 MG: 50 INJECTION INTRAMUSCULAR; INTRAVENOUS at 06:08

## 2023-08-23 RX ADMIN — Medication 6 MG: at 09:08

## 2023-08-23 RX ADMIN — QUETIAPINE FUMARATE 25 MG: 25 TABLET ORAL at 09:08

## 2023-08-23 RX ADMIN — ENOXAPARIN SODIUM 30 MG: 30 INJECTION SUBCUTANEOUS at 05:08

## 2023-08-23 NOTE — PT/OT/SLP PROGRESS
Physical Therapy  Treatment    Danielle Loera   MRN: [id number]   Admitting Diagnosis: <principal problem not specified>       PT Start Time: 1330     PT Stop Time: 1354    PT Total Time (min): 24 min       Billable Minutes:  Gait Training 12 and Therapeutic Activity 12             Number of PTA visits since last PT visit: 1       General Precautions: Standard, fall  Orthopedic Precautions:    Braces: N/A  Respiratory Status: Room air    Spiritual, Cultural Beliefs, Scientology Practices, Values that Affect Care: no    Subjective:  Communicated with NSG prior to session. Pt sitting UIC with son present; son reports pt is ready to get back to bed.   Pt agreeable to working with therapy and getting back to bed but appears confused at times throughout session and wanting to get back to her bed at home. Pt reoriented t/o session and tolerated mobility fairly. Pt returned to supine at the end of PT session.         Objective:        Functional Mobility:  Bed Mobility:    Sit to supine. Mod A to return to supine in bed.    Transfers:   Sit to/from stand. Mod A x 2 persons to come to standing from chair.    Gait:    Pt ambulated ~40ft with encouragement t/o to increase gait distance. Pt demos a slow but steady step through gait pattern. No unsteadiness or lOB. Tactile cues on the RW for direction at times. Several standing  rest breaks.      AM-PAC 6 CLICK MOBILITY  How much help from another person does this patient currently need?   1 = Unable, Total/Dependent Assistance  2 = A lot, Maximum/Moderate Assistance  3 = A little, Minimum/Contact Guard/Supervision  4 = None, Modified [place]/Independent         AM-PAC Raw Score CMS G-Code Modifier Level of Impairment Assistance   6 % Total / Unable   7 - 9 CM 80 - 100% Maximal Assist   10 - 14 CL 60 - 80% Moderate Assist   15 - 19 CK 40 - 60% Moderate Assist   20 - 22 CJ 20 - 40% Minimal Assist   23 CI 1-20% SBA / CGA   24 CH 0% Independent/ Mod I     Patient left  HOB elevated with all lines intact and call button in reach.    Assessment:  Danielle Loera is a 89 y.o. female with a medical diagnosis of <principal problem not specified> and presents with .    Rehab identified problem list/impairments: weakness, impaired endurance, impaired self care skills, impaired functional mobility, gait instability, impaired balance, impaired cognition, decreased upper extremity function, decreased ROM, decreased safety awareness    Rehab potential is good.    Activity tolerance: Good    Discharge recommendations: nursing facility, skilled      Barriers to discharge:      Equipment recommendations: to be determined by next level of care     GOALS:   Multidisciplinary Problems       Physical Therapy Goals          Problem: Physical Therapy    Goal Priority Disciplines Outcome Goal Variances Interventions   Physical Therapy Goal     PT, PT/OT Ongoing, Progressing     Description: Goals to be met by: 23     Patient will increase functional independence with mobility by performin. Supine to sit with Contact Guard Assistance  2. Sit to stand transfer with Contact Guard Assistance  3. Bed to chair transfer with Contact Guard Assistance using Rolling Walker  4. Gait  x 300 feet with Contact Guard Assistance using Rolling Walker.                          PLAN:    Patient to be seen 5 x/week to address the above listed problems via gait training, therapeutic activities, therapeutic exercises  Plan of Care expires: 23  Plan of Care reviewed with: patient         2023

## 2023-08-23 NOTE — PROGRESS NOTES
"Inpatient Nutrition Evaluation    Admit Date: 2023   Total duration of encounter: 4 days    Nutrition Recommendation/Prescription     -Continue heart healthy diet. Encouragement of meals when awake.   -Continue strawberry Boost (provides 240 kcal, 10 g protein per serving)     Nutrition Assessment     Chart Review    Reason Seen: malnutrition screening tool (MST)    Malnutrition Screening Tool Results   Have you recently lost weight without trying?: Yes: 2-13 lbs  Have you been eating poorly because of a decreased appetite?: Yes   MST Score: 2     Diagnosis:  Unwitnessed fall etiology unknown   Hypokalemia   SUBHASH on CKD stage 3a  Normocytic anemia     Relevant Medical History: hypertension, dementia, CKD stage IIIA and vitamin-D deficiency    Nutrition-Related Medications: LR @ 75ml/hr    Nutrition-Related Labs:  23 K 3.3, BUN 21.4, Crea 1.45, eGFR 94    Diet Order: Diet heart healthy  Oral Supplement Order: none  Appetite/Oral Intake: fair/not applicable  Factors Affecting Nutritional Intake: decreased appetite and lethargy  Food/Advent/Cultural Preferences: none reported  Food Allergies: none reported       Wound(s):       Comments    23 Pt sleeping, son at bedside gave information. States pt with decreased appetite since here, sleeping most of the time. He will encourage Boost supplement when awake. States pt had gradual 9lbs wt loss from a 3-4 month time frame prior to admit. Weight hx from May 2023 of 73.1kg to 69kg in August indicating 5% wt loss x3 months, not significant. No muscle or fat loss noted. Will follow up early.     Anthropometrics    Height: 5' 7" (170.2 cm)    Last Weight: 75.3 kg (166 lb) (23 190) Weight Method: Bed Scale  BMI (Calculated): 26  BMI Classification: overweight (BMI 25-29.9)     Ideal Body Weight (IBW), Female: 135 lb     % Ideal Body Weight, Female (lb): 122.96 %                    Usual Body Weight (UBW), k.18 kg  % Usual Body Weight: 103.11     Usual " Weight Provided By: family/caregiver    Wt Readings from Last 5 Encounters:   08/21/23 75.3 kg (166 lb)   08/02/23 69 kg (152 lb 1.9 oz)   05/01/23 73.1 kg (161 lb 3.2 oz)   02/01/23 73.7 kg (162 lb 7.7 oz)     Weight Change(s) Since Admission:  Admit Weight: 75.3 kg (166 lb) (08/19/23 2142)  8/23/23 75.3kg admit wt    Patient Education    Not applicable.    Monitoring & Evaluation     Dietitian will monitor energy intake and weight change.  Nutrition Risk/Follow-Up: low (follow-up in 5-7 days)  Patients assigned 'low nutrition risk' status do not qualify for a full nutritional assessment but will be monitored and re-evaluated in a 5-7 day time period. Please consult if re-evaluation needed sooner.

## 2023-08-23 NOTE — PT/OT/SLP PROGRESS
Occupational Therapy   Treatment    Name: Danielle Loera  MRN: [id number]  Admitting Diagnosis: Unwitnessed fall, ?Syncope, Hypokalemia, SUBHASH on CKD stage 3a, Normocytic anemia. History of hypertension, dementia and vitamin-D deficiency   Recent Surgery: * No surgery found *     Recommendations:     Discharge Recommendations: nursing facility, skilled  Discharge Equipment Recommendations: TBD, pending progress  Barriers to discharge: medical dx    Assessment:     Danielle Loera is a 89 y.o. female with a medical diagnosis of Unwitnessed fall, ?Syncope, Hypokalemia, SUBHASH on CKD stage 3a, Normocytic anemia. History of hypertension, dementia and vitamin-D deficiency. Performance deficits affecting function are weakness, impaired endurance, impaired self care skills, impaired functional mobility, gait instability, impaired balance, impaired cognition, decreased upper extremity function, decreased lower extremity function, decreased safety awareness, decreased ROM.     Rehab Prognosis: Fair; patient would benefit from acute skilled OT services to address these deficits and reach maximum level of function.       Plan:     Patient to be seen 5 x/week to address the above listed problems via self-care/home management, therapeutic activities, therapeutic exercises  Plan of Care Expires: 09/05/23  Plan of Care Reviewed with: patient, other (see comments) (sons x2)    Subjective     Pain/Comfort:  Pain Rating 1: 0/10    Pt's 2 sons were present, and provided pt's hx and PLOF. See eval for information.    Objective:     Communicated with: RN prior to session.  Patient found supine with telemetry, PureWick, bed alarm (rollbelt) upon OT entry to room.    General Precautions: Standard, fall  Respiratory Status: Room air     Occupational Performance:     Bed Mobility:    Patient completed Supine to Sit with maximal assistance     Functional Mobility/Transfers:  Patient completed Sit > Stand Transfers with moderate assistance  "with rolling walker   Patient completed Bed > Chair Transfer using Step Transfer technique with min A and vcs provided with rolling walker    Activities of Daily Living:  Grooming: Pt washed face using bath cloth with min A provided, and combed hair with max A provided while seated in chair.  Feeding: Pt required assist to retrieve food item from tray using eating utensil. Pt able to grasp/transfer eating utensil and drink container to mouth using LUE. Pt demonstrated increased ability to perform task using LUE vs R. CNA was notified of this, and RN was notified that pt needs an "Assist with Feeding" sign on door. Understanding verbalized.    Patient left up in chair with all lines intact, call button in reach, chair alarm on, RN notified, pt's sons present, and Grover vest donned.    GOALS:   Multidisciplinary Problems       Occupational Therapy Goals          Problem: Occupational Therapy    Goal Priority Disciplines Outcome Interventions   Occupational Therapy Goal     OT, PT/OT Ongoing, Progressing    Description: Goals to be met by: 9/5/23     Patient will increase functional independence with ADLs by performing:    UE Dressing with Stand-by Assistance.  LE Dressing with Minimal Assistance and Assistive Devices as needed.  Grooming while standing at sink with Stand-by Assistance.  Toileting from toilet with Minimal Assistance for hygiene and clothing management.                          Time Tracking:     OT Date of Treatment: 8/23/23  OT Start Time: 1044  OT Stop Time: 1127  OT Total Time (min): 43 min    Billable Minutes: Self Care/Home Management 37 mins  Conference 6 mins (obtained pt's hx and PLOF from pt's family)    OT/JUSTIN: OT     Number of JUSTIN visits since last OT visit: 1 8/23/2023      "

## 2023-08-23 NOTE — PROGRESS NOTES
[facility]  Hospital Medicine Progress Note        Chief Complaint: Inpatient Follow-up     HPI:   89 y.o. Black or  female with a past medical history of hypertension, dementia, CKD stage IIIA and vitamin-D deficiency. The patient presented to [facility] on 8/19/2023 following a and unwitnessed fall.  On exam patient reports having shortness a breath at times but denies complaints of headache, vision changes, chest pain, abdominal pain, nausea, vomiting, diarrhea and burning with urination.  She states she does not remember the fall and therefore unable to give further details. At baseline patient lives alone, walks independently and completes activities of daily living on her own.     Upon presentation to the ED, temperature 98.3° F, heart rate 80, blood pressure 132/71, respiratory rate 18 and SpO2 96% on room air.  Labs with H&H 11.4/33.5, potassium 3.2, BUN/creatinine 21.7/1.51, troponin 0.039.  UA negative for infection.  EKG sinus rhythm with marked sinus arrhythmia and right bundle-branch block.  X-ray of the left hip and left knee without any acute osseous findings.  Preliminary read of CT of the head revealed no acute intracranial traumatic injury.  Preliminary read CT of cervical spine with no acute cervical spine fracture, dislocation or subluxation but degenerative changes.  In the ED patient received 20 mEq of IV potassium chloride.  Patient is admitted to hospital medicine services for further medical management.        Patient presents for a fall without associated symptoms.   Noted to have a history of hypertension   Labs reviewed, she is slightly hypokalemic and and renal function appears to be at or near baseline (Cr in Feb 1.56, today it is 1.45). Otherwise, labs are unremarkable.       Son is present at bedside states that she lives at home alone but there are cameras all over home and he lives 2 minutes away.  There are several siblings that help take  care for.    He states that on Friday she was in her normal state of health but on Saturday when he went home it seemed as if she had fallen on the floor, when he went there she was awake and alert attempting to get up.    This morning patient is combative completely disoriented and difficult to redirect.  CT of the head shows no acute abnormalities.     Family would like NH placement.   Interval Hx:   No acute events reported overnight, patient was resting comfortably on bed, easily arousable, sons were at bedside.  Patient appears confused.  Reported patient was agitated last night pulled out of her peripheral IV received Ativan.    Per discussion with the son who was at bedside, patient at baseline has intermittent confusion lately but currently she is definitely not at her baseline.     Case was discussed with patient's nurse and  on the floor.    Objective/physical exam:  General: In no acute distress, afebrile  Chest:  Unlabored breathing   Heart:  Normal rate   Abdomen: Soft, nontender, BS +  MSK: Warm, no lower extremity edema, no clubbing or cyanosis  Neurologic:  Easily arousable, confused moving all extremities spontaneously    VITAL SIGNS: 24 HRS MIN & MAX LAST   Temp  Min: 97.3 °F (36.3 °C)  Max: 98.6 °F (37 °C) 97.3 °F (36.3 °C)   BP  Min: 133/80  Max: 163/93 (!) 153/87   Pulse  Min: 80  Max: 107  85   Resp  Min: 16  Max: 18 18   SpO2  Min: 96 %  Max: 99 % 96 %     I have reviewed the following labs:  Recent Labs   Lab 08/19/23 2314 08/20/23  0432   WBC 6.08 5.03   RBC 3.78* 4.03*   HGB 11.4* 12.1   HCT 33.5* 35.9*   MCV 88.6 89.1   MCH 30.2 30.0   MCHC 34.0 33.7   RDW 14.1 14.0   * 116*   MPV 11.6* 11.4*     Recent Labs   Lab 08/19/23 2314 08/20/23  0432    141   K 3.2* 3.3*   CO2 27 26   BUN 21.7* 21.4*   CREATININE 1.51* 1.45*   CALCIUM 9.1 9.2   MG 2.00 2.10   ALBUMIN 3.7 3.8   ALKPHOS 59 62   ALT 13 14   AST 30 30   BILITOT 0.7 0.7     Microbiology Results (last 7 days)        ** No results found for the last 168 hours. **             See below for Radiology    Scheduled Med:   aspirin  81 mg Oral Daily    enoxparin  30 mg Subcutaneous Daily    lisinopriL  10 mg Oral Daily      Continuous Infusions:   lactated ringers 75 mL/hr at 08/23/23 0003      PRN Meds:  acetaminophen, aluminum-magnesium hydroxide-simethicone, diphenhydrAMINE, hydrALAZINE, lorazepam, melatonin, naloxone, ondansetron, polyethylene glycol, prochlorperazine, simethicone     Assessment/Plan:  Unwitnessed fall etiology unknown   Progression of possible underlying dementia  Hypokalemia   SUBHASH on CKD stage 3a  Normocytic anemia   History of hypertension, dementia and vitamin-D deficiency     Presents for fall, CT and MRI brain without acute abnormality.   PT/OT consulted.  Recommended skilled nursing facility  EEG mild generalized slowing  We will start on Seroquel low-dose for better control of agitations avoid Benadryl benzos  Place one-to-one sitter if needed  Previous labs showed mild hypokalemia, Obtain labs in the a.m.  Case discussed with patient's nurse, case management    VTE prophylaxis:  Lovenox    Patient condition:  Fair    Anticipated discharge and Disposition:   Skilled nursing facility, placement      _____________________________________________________________________    Nutrition Status:    Radiology:  I have personally reviewed the following imaging and agree with the radiologist.     MRI Brain Without Contrast  Narrative: EXAMINATION:  MRI BRAIN WITHOUT CONTRAST    CLINICAL HISTORY:  Mental status change, unknown cause;    TECHNIQUE:  Multiplanar MR imaging of the brain was performed without contrast.    COMPARISON:  CT 08/19/2023    FINDINGS:  No acute infarct, mass effect or hemorrhage.  White matter FLAIR hyperintensities are nonspecific but likely related to small vessel ischemic disease.  Ventricles and sulci are proportionate.    No abnormal extra-axial fluid collections.    Flow voids are  maintained in the intracranial arteries and dural venous sinuses.    Skull base and calvarium have a normal signal.  Impression: No acute findings in the brain.    Findings are compatible with the preliminary report.    Electronically signed by: Dylan Gutierrez MD  Date:    08/21/2023  Time:    09:10      Gene Oneal MD   08/23/2023

## 2023-08-24 LAB
ANION GAP SERPL CALC-SCNC: 12 MEQ/L
BASOPHILS # BLD AUTO: 0.01 X10(3)/MCL
BASOPHILS NFR BLD AUTO: 0.2 %
BUN SERPL-MCNC: 15.8 MG/DL (ref 9.8–20.1)
CALCIUM SERPL-MCNC: 8.9 MG/DL (ref 8.4–10.2)
CHLORIDE SERPL-SCNC: 101 MMOL/L (ref 98–111)
CO2 SERPL-SCNC: 29 MMOL/L (ref 23–31)
CREAT SERPL-MCNC: 0.86 MG/DL (ref 0.55–1.02)
CREAT/UREA NIT SERPL: 18
EOSINOPHIL # BLD AUTO: 0.07 X10(3)/MCL (ref 0–0.9)
EOSINOPHIL NFR BLD AUTO: 1.6 %
ERYTHROCYTE [DISTWIDTH] IN BLOOD BY AUTOMATED COUNT: 13.9 % (ref 11.5–17)
GFR SERPLBLD CREATININE-BSD FMLA CKD-EPI: >60 MLS/MIN/1.73/M2
GLUCOSE SERPL-MCNC: 97 MG/DL (ref 75–121)
HCT VFR BLD AUTO: 35.4 % (ref 37–47)
HGB BLD-MCNC: 12 G/DL (ref 12–16)
IMM GRANULOCYTES # BLD AUTO: 0.03 X10(3)/MCL (ref 0–0.04)
IMM GRANULOCYTES NFR BLD AUTO: 0.7 %
LYMPHOCYTES # BLD AUTO: 0.95 X10(3)/MCL (ref 0.6–4.6)
LYMPHOCYTES NFR BLD AUTO: 21.3 %
MAGNESIUM SERPL-MCNC: 1.7 MG/DL (ref 1.6–2.6)
MCH RBC QN AUTO: 29.9 PG (ref 27–31)
MCHC RBC AUTO-ENTMCNC: 33.9 G/DL (ref 33–36)
MCV RBC AUTO: 88.3 FL (ref 80–94)
MONOCYTES # BLD AUTO: 0.32 X10(3)/MCL (ref 0.1–1.3)
MONOCYTES NFR BLD AUTO: 7.2 %
NEUTROPHILS # BLD AUTO: 3.08 X10(3)/MCL (ref 2.1–9.2)
NEUTROPHILS NFR BLD AUTO: 69 %
NRBC BLD AUTO-RTO: 0 %
PLATELET # BLD AUTO: 137 X10(3)/MCL (ref 130–400)
PMV BLD AUTO: 11.8 FL (ref 7.4–10.4)
POTASSIUM SERPL-SCNC: 3.6 MMOL/L (ref 3.5–5.1)
RBC # BLD AUTO: 4.01 X10(6)/MCL (ref 4.2–5.4)
SODIUM SERPL-SCNC: 142 MMOL/L (ref 132–146)
WBC # SPEC AUTO: 4.46 X10(3)/MCL (ref 4.5–11.5)

## 2023-08-24 PROCEDURE — 96361 HYDRATE IV INFUSION ADD-ON: CPT

## 2023-08-24 PROCEDURE — 25000003 PHARM REV CODE 250: Performed by: STUDENT IN AN ORGANIZED HEALTH CARE EDUCATION/TRAINING PROGRAM

## 2023-08-24 PROCEDURE — 80048 BASIC METABOLIC PNL TOTAL CA: CPT | Performed by: INTERNAL MEDICINE

## 2023-08-24 PROCEDURE — 85025 COMPLETE CBC W/AUTO DIFF WBC: CPT | Performed by: INTERNAL MEDICINE

## 2023-08-24 PROCEDURE — 63600175 PHARM REV CODE 636 W HCPCS: Performed by: STUDENT IN AN ORGANIZED HEALTH CARE EDUCATION/TRAINING PROGRAM

## 2023-08-24 PROCEDURE — 63600175 PHARM REV CODE 636 W HCPCS: Performed by: INTERNAL MEDICINE

## 2023-08-24 PROCEDURE — 63600175 PHARM REV CODE 636 W HCPCS: Performed by: NURSE PRACTITIONER

## 2023-08-24 PROCEDURE — 25000003 PHARM REV CODE 250: Performed by: INTERNAL MEDICINE

## 2023-08-24 PROCEDURE — 97116 GAIT TRAINING THERAPY: CPT | Mod: CQ

## 2023-08-24 PROCEDURE — 96376 TX/PRO/DX INJ SAME DRUG ADON: CPT

## 2023-08-24 PROCEDURE — G0378 HOSPITAL OBSERVATION PER HR: HCPCS

## 2023-08-24 PROCEDURE — 83735 ASSAY OF MAGNESIUM: CPT | Performed by: INTERNAL MEDICINE

## 2023-08-24 PROCEDURE — 97530 THERAPEUTIC ACTIVITIES: CPT | Mod: CQ

## 2023-08-24 PROCEDURE — 96372 THER/PROPH/DIAG INJ SC/IM: CPT | Performed by: INTERNAL MEDICINE

## 2023-08-24 RX ADMIN — DIPHENHYDRAMINE HYDROCHLORIDE 25 MG: 50 INJECTION INTRAMUSCULAR; INTRAVENOUS at 08:08

## 2023-08-24 RX ADMIN — QUETIAPINE FUMARATE 25 MG: 25 TABLET ORAL at 09:08

## 2023-08-24 RX ADMIN — ENOXAPARIN SODIUM 30 MG: 30 INJECTION SUBCUTANEOUS at 05:08

## 2023-08-24 RX ADMIN — LISINOPRIL 10 MG: 10 TABLET ORAL at 08:08

## 2023-08-24 RX ADMIN — HYDRALAZINE HYDROCHLORIDE 10 MG: 20 INJECTION INTRAMUSCULAR; INTRAVENOUS at 03:08

## 2023-08-24 RX ADMIN — ASPIRIN 81 MG CHEWABLE TABLET 81 MG: 81 TABLET CHEWABLE at 08:08

## 2023-08-24 RX ADMIN — SODIUM CHLORIDE, POTASSIUM CHLORIDE, SODIUM LACTATE AND CALCIUM CHLORIDE: 600; 310; 30; 20 INJECTION, SOLUTION INTRAVENOUS at 04:08

## 2023-08-24 NOTE — PLAN OF CARE
Spoke to Pt's son Soy letting him know I've put a pt's choice list in his mom's room since Tuesday 22,2023. I stated to him that his mom doesn't meet criteria for Rehab that he must pick a NH Md stated we're not doing any treatment for the pt and she's ready for DC. Son stated he'll be here within the hour to meet with me so he can pick a NH.updated CM with care plan.

## 2023-08-24 NOTE — PT/OT/SLP PROGRESS
"Physical Therapy Treatment    Patient Name:  Danielle Loera   MRN:  [id number]    Recommendations:     Discharge Recommendations: nursing facility, skilled  Discharge Equipment Recommendations: to be determined by next level of care  Barriers to discharge: Impaired mobility    Assessment:     Danielle Loera is a 89 y.o. female admitted with a medical diagnosis of <principal problem not specified>.  She presents with the following impairments/functional limitations: weakness, impaired endurance, impaired self care skills, impaired functional mobility, gait instability, impaired balance, impaired cognition, decreased upper extremity function, decreased ROM, decreased safety awareness .    Rehab Prognosis: Good; patient would benefit from acute skilled PT services to address these deficits and reach maximum level of function.    Recent Surgery: * No surgery found *      Plan:     During this hospitalization, patient to be seen 5 x/week to address the identified rehab impairments via gait training, therapeutic activities, therapeutic exercises and progress toward the following goals:    Plan of Care Expires:  09/22/23    Subjective     Chief Complaint: :"I want to go home"  Patient/Family Comments/goals:   Pain/Comfort:         Objective:     Communicated with NSG prior to session.  Patient found up in chair with   upon PT entry to room.     General Precautions: Standard, fall  Orthopedic Precautions:    Braces: N/A  Respiratory Status: Room air  Skin Integrity: Visible skin intact      Functional Mobility:  Bed Mobility:     Supine to Sit: moderate assistance  Transfers: Sit to/from stand. Mod A to come to standing. Hygiene performed in standing. Pt heavily saturated purewick noted to bed disconnected.  Sit to/from stand x 5  Gait: Pt ambulated ~20ft. Min A. Cues for direction and reorientation to task. Very slow gait speed. Assist with steering the RW>      Patient left up in chair with all lines intact, call " button in reach, and posey vest and chair alarm on ..    GOALS:   Multidisciplinary Problems       Physical Therapy Goals          Problem: Physical Therapy    Goal Priority Disciplines Outcome Goal Variances Interventions   Physical Therapy Goal     PT, PT/OT Ongoing, Progressing     Description: Goals to be met by: 23     Patient will increase functional independence with mobility by performin. Supine to sit with Contact Guard Assistance  2. Sit to stand transfer with Contact Guard Assistance  3. Bed to chair transfer with Contact Guard Assistance using Rolling Walker  4. Gait  x 300 feet with Contact Guard Assistance using Rolling Walker.                          Time Tracking:     PT Received On:    PT Start Time: 830     PT Stop Time: 856  PT Total Time (min): 26 min     Billable Minutes: Gait Training 13 and Therapeutic Activity 13    Treatment Type: Treatment  PT/PTA: PTA     Number of PTA visits since last PT visit: 2     2023

## 2023-08-24 NOTE — PROGRESS NOTES
[facility]  Hospital Medicine Progress Note        Chief Complaint: Inpatient Follow-up     HPI:   89 y.o. Black or  female with a past medical history of hypertension, dementia, CKD stage IIIA and vitamin-D deficiency. The patient presented to [facility] on 8/19/2023 following a and unwitnessed fall.  On exam patient reports having shortness a breath at times but denies complaints of headache, vision changes, chest pain, abdominal pain, nausea, vomiting, diarrhea and burning with urination.  She states she does not remember the fall and therefore unable to give further details. At baseline patient lives alone, walks independently and completes activities of daily living on her own.     Upon presentation to the ED, temperature 98.3° F, heart rate 80, blood pressure 132/71, respiratory rate 18 and SpO2 96% on room air.  Labs with H&H 11.4/33.5, potassium 3.2, BUN/creatinine 21.7/1.51, troponin 0.039.  UA negative for infection.  EKG sinus rhythm with marked sinus arrhythmia and right bundle-branch block.  X-ray of the left hip and left knee without any acute osseous findings.  Preliminary read of CT of the head revealed no acute intracranial traumatic injury.  Preliminary read CT of cervical spine with no acute cervical spine fracture, dislocation or subluxation but degenerative changes.  In the ED patient received 20 mEq of IV potassium chloride.  Patient is admitted to hospital medicine services for further medical management.        Patient presents for a fall without associated symptoms.   Noted to have a history of hypertension   Labs reviewed, she is slightly hypokalemic and and renal function appears to be at or near baseline (Cr in Feb 1.56, today it is 1.45). Otherwise, labs are unremarkable.       Son is present at bedside states that she lives at home alone but there are cameras all over home and he lives 2 minutes away.  There are several siblings that help take  care for.    He states that on Friday she was in her normal state of health but on Saturday when he went home it seemed as if she had fallen on the floor, when he went there she was awake and alert attempting to get up.    This morning patient is combative completely disoriented and difficult to redirect.  CT of the head shows no acute abnormalities.     Family would like NH placement.   Interval Hx:   8/23/23-No acute events reported overnight, patient was resting comfortably on bed, easily arousable, sons were at bedside.  Patient appears confused.  Reported patient was agitated last night pulled out of her peripheral IV received Ativan.    Per discussion with the son who was at bedside, patient at baseline has intermittent confusion lately but currently she is definitely not at her baseline.      08/24/2023 Dr. Watt-chart reviewed patient examined.  Son in room.  Patient appears to be at baseline.   working on nursing home placement.  All electrolytes at baseline/within normal limits     Case was discussed with patient's nurse and  on the floor.    Objective/physical exam:  General: In no acute distress, afebrile  Chest:  Unlabored breathing   Heart:  Normal rate   Abdomen: Soft, nontender, BS +  MSK: Warm, no lower extremity edema, no clubbing or cyanosis  Neurologic:  Easily arousable, confused moving all extremities spontaneously    VITAL SIGNS: 24 HRS MIN & MAX LAST   Temp  Min: 97.5 °F (36.4 °C)  Max: 98.4 °F (36.9 °C) 97.8 °F (36.6 °C)   BP  Min: 122/55  Max: 165/85 (!) 122/55   Pulse  Min: 85  Max: 94  87   Resp  Min: 18  Max: 18 18   SpO2  Min: 96 %  Max: 98 % 97 %     I have reviewed the following labs:  Recent Labs   Lab 08/19/23  2314 08/20/23  0432 08/24/23  0713   WBC 6.08 5.03 4.46*   RBC 3.78* 4.03* 4.01*   HGB 11.4* 12.1 12.0   HCT 33.5* 35.9* 35.4*   MCV 88.6 89.1 88.3   MCH 30.2 30.0 29.9   MCHC 34.0 33.7 33.9   RDW 14.1 14.0 13.9   * 116* 137   MPV 11.6* 11.4*  11.8*       Recent Labs   Lab 08/19/23  2314 08/20/23  0432 08/24/23  0714    141 142   K 3.2* 3.3* 3.6   CO2 27 26 29   BUN 21.7* 21.4* 15.8   CREATININE 1.51* 1.45* 0.86   CALCIUM 9.1 9.2 8.9   MG 2.00 2.10 1.70   ALBUMIN 3.7 3.8  --    ALKPHOS 59 62  --    ALT 13 14  --    AST 30 30  --    BILITOT 0.7 0.7  --        Microbiology Results (last 7 days)       ** No results found for the last 168 hours. **             See below for Radiology    Scheduled Med:   aspirin  81 mg Oral Daily    enoxparin  30 mg Subcutaneous Daily    lisinopriL  10 mg Oral Daily    QUEtiapine  25 mg Oral QHS      Continuous Infusions:   lactated ringers 75 mL/hr at 08/24/23 0418      PRN Meds:  acetaminophen, aluminum-magnesium hydroxide-simethicone, diphenhydrAMINE, hydrALAZINE, lorazepam, melatonin, naloxone, ondansetron, polyethylene glycol, prochlorperazine, simethicone     Assessment/Plan:  Unwitnessed fall etiology unknown    CT and MRI brain without acute abnormali-       dementia  Hypokalemia -resolved  SUBHASH on CKD stage 3a  Normocytic anemia   History of hypertension, dementia and vitamin-D deficiency       PT/OT consulted.  Recommended skilled nursing facility  EEG mild generalized slowing  on Seroquel low-dose for better control of agitations avoid Benadryl benzos  Place one-to-one sitter if needed    Case discussed with patient's nurse, case management    VTE prophylaxis:  Lovenox    Patient condition:  Fair    Anticipated discharge and Disposition:  placement at nursing home      _____________________________________________________________________    Nutrition Status:    Radiology:  I have personally reviewed the following imaging and agree with the radiologist.     MRI Brain Without Contrast  Narrative: EXAMINATION:  MRI BRAIN WITHOUT CONTRAST    CLINICAL HISTORY:  Mental status change, unknown cause;    TECHNIQUE:  Multiplanar MR imaging of the brain was performed without contrast.    COMPARISON:  CT  08/19/2023    FINDINGS:  No acute infarct, mass effect or hemorrhage.  White matter FLAIR hyperintensities are nonspecific but likely related to small vessel ischemic disease.  Ventricles and sulci are proportionate.    No abnormal extra-axial fluid collections.    Flow voids are maintained in the intracranial arteries and dural venous sinuses.    Skull base and calvarium have a normal signal.  Impression: No acute findings in the brain.    Findings are compatible with the preliminary report.    Electronically signed by: Dylan Gutierrez MD  Date:    08/21/2023  Time:    09:10      Abdelrahman Watt MD   08/24/2023

## 2023-08-25 PROCEDURE — 25000003 PHARM REV CODE 250: Performed by: STUDENT IN AN ORGANIZED HEALTH CARE EDUCATION/TRAINING PROGRAM

## 2023-08-25 PROCEDURE — 63600175 PHARM REV CODE 636 W HCPCS: Performed by: INTERNAL MEDICINE

## 2023-08-25 PROCEDURE — 97116 GAIT TRAINING THERAPY: CPT | Mod: CQ

## 2023-08-25 PROCEDURE — 25000003 PHARM REV CODE 250: Performed by: NURSE PRACTITIONER

## 2023-08-25 PROCEDURE — 25000003 PHARM REV CODE 250: Performed by: INTERNAL MEDICINE

## 2023-08-25 PROCEDURE — 97530 THERAPEUTIC ACTIVITIES: CPT | Mod: CQ

## 2023-08-25 PROCEDURE — G0378 HOSPITAL OBSERVATION PER HR: HCPCS

## 2023-08-25 PROCEDURE — 96372 THER/PROPH/DIAG INJ SC/IM: CPT | Performed by: INTERNAL MEDICINE

## 2023-08-25 PROCEDURE — 97535 SELF CARE MNGMENT TRAINING: CPT | Mod: CO

## 2023-08-25 RX ORDER — LISINOPRIL 20 MG/1
20 TABLET ORAL DAILY
Status: DISCONTINUED | OUTPATIENT
Start: 2023-08-26 | End: 2023-08-26

## 2023-08-25 RX ADMIN — Medication 6 MG: at 09:08

## 2023-08-25 RX ADMIN — QUETIAPINE FUMARATE 25 MG: 25 TABLET ORAL at 09:08

## 2023-08-25 RX ADMIN — ASPIRIN 81 MG CHEWABLE TABLET 81 MG: 81 TABLET CHEWABLE at 10:08

## 2023-08-25 RX ADMIN — ENOXAPARIN SODIUM 30 MG: 30 INJECTION SUBCUTANEOUS at 05:08

## 2023-08-25 NOTE — PT/OT/SLP PROGRESS
Occupational Therapy   Treatment    Name: Danielle Loera  MRN: [id number]  Admitting Diagnosis:  <principal problem not specified>       Recommendations:     Discharge Recommendations: nursing facility, skilled  Discharge Equipment Recommendations:     Barriers to discharge:       Assessment:     Danielle Loera is a 89 y.o. female with a medical diagnosis of <principal problem not specified>.  Performance deficits affecting function are weakness, impaired endurance, impaired self care skills, impaired functional mobility, gait instability, impaired balance, impaired cognition, decreased upper extremity function, decreased lower extremity function, decreased safety awareness, decreased ROM.     Rehab Prognosis:  Fair; patient would benefit from acute skilled OT services to address these deficits and reach maximum level of function.       Plan:     Patient to be seen 5 x/week to address the above listed problems via self-care/home management, therapeutic activities, therapeutic exercises  Plan of Care Expires: 09/05/23  Plan of Care Reviewed with: patient, other (see comments) (sons x2)    Subjective     Pain/Comfort:       Objective:     Communicated with: RN prior to session.  Patient found HOB elevated with   upon OT entry to room.    General Precautions: Standard, fall    Orthopedic Precautions:   Braces:       Occupational Performance:     Bed Mobility:    Patient completed Supine to Sit with moderate assistance  Patient completed Sit to Supine with moderate assistance     Functional Mobility/Transfers:  Patient completed Sit <> Stand Transfer with stand by assistance  with  rolling walker   Functional Mobility: patient with no LOB, patient with incontinent episode requiring abandonment of task and return to supine.    Activities of Daily Living:  Upper Body Dressing: total assistance donning/doffing gown  Lower Body Dressing: total assistance donning/doffing socks  Toileting: total assistance pericare  following urination in sidelying    Patient Education:  Patient provided with verbal education regarding OT role/goals/POC.  Understanding was verbalized.      Patient left HOB elevated with all lines intact and call button in reach    GOALS:   Multidisciplinary Problems       Occupational Therapy Goals          Problem: Occupational Therapy    Goal Priority Disciplines Outcome Interventions   Occupational Therapy Goal     OT, PT/OT Ongoing, Progressing    Description: Goals to be met by: 9/5/23     Patient will increase functional independence with ADLs by performing:    UE Dressing with Stand-by Assistance.  LE Dressing with Minimal Assistance and Assistive Devices as needed.  Grooming while standing at sink with Stand-by Assistance.  Toileting from toilet with Minimal Assistance for hygiene and clothing management.                          Time Tracking:     OT Date of Treatment: 08/25/23  OT Start Time: 1314  OT Stop Time: 1341  OT Total Time (min): 27 min    Billable Minutes:Self Care/Home Management 2    OT/JUSTIN: JUSTIN     Number of JUSTIN visits since last OT visit: 2    8/25/2023

## 2023-08-25 NOTE — PLAN OF CARE
Called and left msg for Rojas @ [facility] about referral sent. Spoke to Pt's son and granddaughter and they stated the would like [facility] for snf placement.

## 2023-08-26 LAB
CREAT SERPL-MCNC: 0.85 MG/DL (ref 0.55–1.02)
GFR SERPLBLD CREATININE-BSD FMLA CKD-EPI: >60 MLS/MIN/1.73/M2

## 2023-08-26 PROCEDURE — 82565 ASSAY OF CREATININE: CPT | Performed by: INTERNAL MEDICINE

## 2023-08-26 PROCEDURE — 25000003 PHARM REV CODE 250: Performed by: INTERNAL MEDICINE

## 2023-08-26 PROCEDURE — G0378 HOSPITAL OBSERVATION PER HR: HCPCS

## 2023-08-26 PROCEDURE — 96376 TX/PRO/DX INJ SAME DRUG ADON: CPT

## 2023-08-26 PROCEDURE — 63600175 PHARM REV CODE 636 W HCPCS: Performed by: INTERNAL MEDICINE

## 2023-08-26 PROCEDURE — 63600175 PHARM REV CODE 636 W HCPCS: Performed by: STUDENT IN AN ORGANIZED HEALTH CARE EDUCATION/TRAINING PROGRAM

## 2023-08-26 PROCEDURE — 25000003 PHARM REV CODE 250: Performed by: NURSE PRACTITIONER

## 2023-08-26 PROCEDURE — 25000003 PHARM REV CODE 250: Performed by: STUDENT IN AN ORGANIZED HEALTH CARE EDUCATION/TRAINING PROGRAM

## 2023-08-26 PROCEDURE — 96372 THER/PROPH/DIAG INJ SC/IM: CPT | Performed by: INTERNAL MEDICINE

## 2023-08-26 RX ADMIN — LISINOPRIL 20 MG: 20 TABLET ORAL at 08:08

## 2023-08-26 RX ADMIN — ASPIRIN 81 MG CHEWABLE TABLET 81 MG: 81 TABLET CHEWABLE at 08:08

## 2023-08-26 RX ADMIN — ENOXAPARIN SODIUM 30 MG: 30 INJECTION SUBCUTANEOUS at 04:08

## 2023-08-26 RX ADMIN — Medication 6 MG: at 08:08

## 2023-08-26 RX ADMIN — QUETIAPINE FUMARATE 25 MG: 25 TABLET ORAL at 08:08

## 2023-08-26 RX ADMIN — HYDRALAZINE HYDROCHLORIDE 10 MG: 20 INJECTION INTRAMUSCULAR; INTRAVENOUS at 04:08

## 2023-08-26 NOTE — PT/OT/SLP PROGRESS
"Physical Therapy Treatment    Patient Name:  Danielle Loera   MRN:  [id number]    Recommendations:     Discharge Recommendations: nursing facility, skilled  Discharge Equipment Recommendations: to be determined by next level of care  Barriers to discharge:  impaired mobility    Assessment:     Danielle Loera is a 89 y.o. female admitted with a medical diagnosis of <principal problem not specified>.  She presents with the following impairments/functional limitations: weakness, impaired endurance, impaired self care skills, impaired functional mobility, gait instability, impaired balance, impaired cognition, decreased upper extremity function, decreased ROM, decreased safety awareness .    Rehab Prognosis: Fair; patient would benefit from acute skilled PT services to address these deficits and reach maximum level of function.    Recent Surgery: * No surgery found *      Plan:     During this hospitalization, patient to be seen 5 x/week to address the identified rehab impairments via gait training, therapeutic activities, therapeutic exercises and progress toward the following goals:    Plan of Care Expires:  09/22/23    Subjective     Chief Complaint: "I'm going home?"  Patient/Family Comments/goals:   Pain/Comfort:         Objective:     Communicated with NSG prior to session.  Patient found supine with hip abduction brace upon PT entry to room.     General Precautions: Standard, fall  Orthopedic Precautions:    Braces: N/A  Respiratory Status: Room air     Functional Mobility:  Bed Mobility:     Supine to Sit: moderate assistance  Transfers:     Sit to Stand:  minimum assistance with rolling walker  Gait: Pt ambulated ~60ft +40ft. Slow but steady step through gait pattern. Decreased step length and renetta with the RW. NO unsteadiness or LOB. Pt returned to room sitting UIC with posey donned and chair alarm. Assisted with breakfast tray.      AM-PAC 6 CLICK MOBILITY            Patient left up in chair with all " lines intact and call button in reach..    GOALS:   Multidisciplinary Problems       Physical Therapy Goals          Problem: Physical Therapy    Goal Priority Disciplines Outcome Goal Variances Interventions   Physical Therapy Goal     PT, PT/OT Ongoing, Progressing     Description: Goals to be met by: 23     Patient will increase functional independence with mobility by performin. Supine to sit with Contact Guard Assistance  2. Sit to stand transfer with Contact Guard Assistance  3. Bed to chair transfer with Contact Guard Assistance using Rolling Walker  4. Gait  x 300 feet with Contact Guard Assistance using Rolling Walker.                          Time Tracking:     PT Received On:    PT Start Time: 900     PT Stop Time: 924  PT Total Time (min): 24 min     Billable Minutes: Gait Training 12 and Therapeutic Activity 12    Treatment Type: Treatment  PT/PTA: PTA     Number of PTA visits since last PT visit: 3     2023

## 2023-08-26 NOTE — PROGRESS NOTES
[facility]  Hospital Medicine Progress Note        Chief Complaint: Inpatient Follow-up     HPI:   89 y.o. Black or  female with a past medical history of hypertension, dementia, CKD stage IIIA and vitamin-D deficiency. The patient presented to [facility] on 8/19/2023 following a and unwitnessed fall.  On exam patient reports having shortness a breath at times but denies complaints of headache, vision changes, chest pain, abdominal pain, nausea, vomiting, diarrhea and burning with urination.  She states she does not remember the fall and therefore unable to give further details. At baseline patient lives alone, walks independently and completes activities of daily living on her own.     Upon presentation to the ED, temperature 98.3° F, heart rate 80, blood pressure 132/71, respiratory rate 18 and SpO2 96% on room air.  Labs with H&H 11.4/33.5, potassium 3.2, BUN/creatinine 21.7/1.51, troponin 0.039.  UA negative for infection.  EKG sinus rhythm with marked sinus arrhythmia and right bundle-branch block.  X-ray of the left hip and left knee without any acute osseous findings.  Preliminary read of CT of the head revealed no acute intracranial traumatic injury.  Preliminary read CT of cervical spine with no acute cervical spine fracture, dislocation or subluxation but degenerative changes.  In the ED patient received 20 mEq of IV potassium chloride.  Patient is admitted to hospital medicine services for further medical management.        Patient presents for a fall without associated symptoms.   Noted to have a history of hypertension   Labs reviewed, she is slightly hypokalemic and and renal function appears to be at or near baseline (Cr in Feb 1.56, today it is 1.45). Otherwise, labs are unremarkable.       Son is present at bedside states that she lives at home alone but there are cameras all over home and he lives 2 minutes away.  There are several siblings that help take  care for.    He states that on Friday she was in her normal state of health but on Saturday when he went home it seemed as if she had fallen on the floor, when he went there she was awake and alert attempting to get up.    This morning patient is combative completely disoriented and difficult to redirect.  CT of the head shows no acute abnormalities.     Family would like NH placement.   Interval Hx:   8/23/23-No acute events reported overnight, patient was resting comfortably on bed, easily arousable, sons were at bedside.  Patient appears confused.  Reported patient was agitated last night pulled out of her peripheral IV received Ativan.    Per discussion with the son who was at bedside, patient at baseline has intermittent confusion lately but currently she is definitely not at her baseline.      08/24/2023 Dr. Santana reviewed patient examined.  Son in room.  Patient appears to be at baseline.   working on nursing home placement.  All electrolytes at baseline/within normal limits    08/25/2023 Dr. Santana reviewed patient examined.  Son in room earlier today.  Patient voices no complaints.   looking into [facility] for sniff placement                 Case was discussed with patient's nurse and  on the floor.    Objective/physical exam:  General: In no acute distress, afebrile  Chest:  Unlabored breathing   Heart:  Normal rate   Abdomen: Soft, nontender, BS +  MSK: Warm, no lower extremity edema, no clubbing or cyanosis  Neurologic:  Easily arousable, confused moving all extremities spontaneously    VITAL SIGNS: 24 HRS MIN & MAX LAST   Temp  Min: 97.5 °F (36.4 °C)  Max: 98.3 °F (36.8 °C) 98 °F (36.7 °C)   BP  Min: 129/62  Max: 166/107 (!) 166/107   Pulse  Min: 77  Max: 96  96   Resp  Min: 15  Max: 18 15   SpO2  Min: 96 %  Max: 97 % 97 %     I have reviewed the following labs:  Recent Labs   Lab 08/19/23  2314 08/20/23  0432 08/24/23  0713   WBC 6.08 5.03  4.46*   RBC 3.78* 4.03* 4.01*   HGB 11.4* 12.1 12.0   HCT 33.5* 35.9* 35.4*   MCV 88.6 89.1 88.3   MCH 30.2 30.0 29.9   MCHC 34.0 33.7 33.9   RDW 14.1 14.0 13.9   * 116* 137   MPV 11.6* 11.4* 11.8*       Recent Labs   Lab 08/19/23  2314 08/20/23  0432 08/24/23  0714    141 142   K 3.2* 3.3* 3.6   CO2 27 26 29   BUN 21.7* 21.4* 15.8   CREATININE 1.51* 1.45* 0.86   CALCIUM 9.1 9.2 8.9   MG 2.00 2.10 1.70   ALBUMIN 3.7 3.8  --    ALKPHOS 59 62  --    ALT 13 14  --    AST 30 30  --    BILITOT 0.7 0.7  --        Microbiology Results (last 7 days)       ** No results found for the last 168 hours. **             See below for Radiology    Scheduled Med:   aspirin  81 mg Oral Daily    enoxparin  30 mg Subcutaneous Daily    lisinopriL  10 mg Oral Daily    QUEtiapine  25 mg Oral QHS      Continuous Infusions:   lactated ringers 75 mL/hr at 08/24/23 0418      PRN Meds:  acetaminophen, aluminum-magnesium hydroxide-simethicone, diphenhydrAMINE, hydrALAZINE, lorazepam, melatonin, naloxone, ondansetron, polyethylene glycol, prochlorperazine, simethicone     Assessment/Plan:  Unwitnessed fall etiology unknown    -CT and MRI brain without acute abnormalities    dementia  Hypokalemia -resolved  SUBHASH on CKD stage 3a  Normocytic anemia   History of hypertension, dementia and vitamin-D deficiency       PT/OT consulted.  Recommended skilled nursing facility  EEG mild generalized slowing  on Seroquel low-dose for better control of agitations avoid Benadryl benzos      Case discussed with patient's nurse, case management- [facility] for snf    VTE prophylaxis:  Lovenox    Patient condition:  Fair    Anticipated discharge and Disposition:  placement at nursing home      _____________________________________________________________________    Nutrition Status:    Radiology:  I have personally reviewed the following imaging and agree with the radiologist.     MRI Brain Without Contrast  Narrative: EXAMINATION:  MRI BRAIN  WITHOUT CONTRAST    CLINICAL HISTORY:  Mental status change, unknown cause;    TECHNIQUE:  Multiplanar MR imaging of the brain was performed without contrast.    COMPARISON:  CT 08/19/2023    FINDINGS:  No acute infarct, mass effect or hemorrhage.  White matter FLAIR hyperintensities are nonspecific but likely related to small vessel ischemic disease.  Ventricles and sulci are proportionate.    No abnormal extra-axial fluid collections.    Flow voids are maintained in the intracranial arteries and dural venous sinuses.    Skull base and calvarium have a normal signal.  Impression: No acute findings in the brain.    Findings are compatible with the preliminary report.    Electronically signed by: Dylan Gutierrez MD  Date:    08/21/2023  Time:    09:10      Abdelrahman Watt MD   08/25/2023

## 2023-08-27 PROCEDURE — 97530 THERAPEUTIC ACTIVITIES: CPT | Mod: CQ

## 2023-08-27 PROCEDURE — 96372 THER/PROPH/DIAG INJ SC/IM: CPT | Performed by: INTERNAL MEDICINE

## 2023-08-27 PROCEDURE — 63600175 PHARM REV CODE 636 W HCPCS: Performed by: NURSE PRACTITIONER

## 2023-08-27 PROCEDURE — 63600175 PHARM REV CODE 636 W HCPCS: Performed by: INTERNAL MEDICINE

## 2023-08-27 PROCEDURE — 96361 HYDRATE IV INFUSION ADD-ON: CPT

## 2023-08-27 PROCEDURE — 97116 GAIT TRAINING THERAPY: CPT | Mod: CQ

## 2023-08-27 PROCEDURE — 87070 CULTURE OTHR SPECIMN AEROBIC: CPT | Performed by: INTERNAL MEDICINE

## 2023-08-27 PROCEDURE — G0378 HOSPITAL OBSERVATION PER HR: HCPCS

## 2023-08-27 PROCEDURE — 25000003 PHARM REV CODE 250: Performed by: STUDENT IN AN ORGANIZED HEALTH CARE EDUCATION/TRAINING PROGRAM

## 2023-08-27 PROCEDURE — 25000003 PHARM REV CODE 250: Performed by: INTERNAL MEDICINE

## 2023-08-27 PROCEDURE — 25000003 PHARM REV CODE 250: Performed by: NURSE PRACTITIONER

## 2023-08-27 RX ADMIN — QUETIAPINE FUMARATE 25 MG: 25 TABLET ORAL at 08:08

## 2023-08-27 RX ADMIN — Medication 6 MG: at 08:08

## 2023-08-27 RX ADMIN — LISINOPRIL 30 MG: 20 TABLET ORAL at 09:08

## 2023-08-27 RX ADMIN — SODIUM CHLORIDE, POTASSIUM CHLORIDE, SODIUM LACTATE AND CALCIUM CHLORIDE: 600; 310; 30; 20 INJECTION, SOLUTION INTRAVENOUS at 09:08

## 2023-08-27 RX ADMIN — ENOXAPARIN SODIUM 30 MG: 30 INJECTION SUBCUTANEOUS at 05:08

## 2023-08-27 RX ADMIN — ASPIRIN 81 MG CHEWABLE TABLET 81 MG: 81 TABLET CHEWABLE at 09:08

## 2023-08-27 NOTE — PROGRESS NOTES
[facility]  Hospital Medicine Progress Note        Chief Complaint: Inpatient Follow-up     HPI:   89 y.o. Black or  female with a past medical history of hypertension, dementia, CKD stage IIIA and vitamin-D deficiency. The patient presented to [facility] on 8/19/2023 following a and unwitnessed fall.  On exam patient reports having shortness a breath at times but denies complaints of headache, vision changes, chest pain, abdominal pain, nausea, vomiting, diarrhea and burning with urination.  She states she does not remember the fall and therefore unable to give further details. At baseline patient lives alone, walks independently and completes activities of daily living on her own.     Upon presentation to the ED, temperature 98.3° F, heart rate 80, blood pressure 132/71, respiratory rate 18 and SpO2 96% on room air.  Labs with H&H 11.4/33.5, potassium 3.2, BUN/creatinine 21.7/1.51, troponin 0.039.  UA negative for infection.  EKG sinus rhythm with marked sinus arrhythmia and right bundle-branch block.  X-ray of the left hip and left knee without any acute osseous findings.  Preliminary read of CT of the head revealed no acute intracranial traumatic injury.  Preliminary read CT of cervical spine with no acute cervical spine fracture, dislocation or subluxation but degenerative changes.  In the ED patient received 20 mEq of IV potassium chloride.  Patient is admitted to hospital medicine services for further medical management.        Patient presents for a fall without associated symptoms.   Noted to have a history of hypertension   Labs reviewed, she is slightly hypokalemic and and renal function appears to be at or near baseline (Cr in Feb 1.56, today it is 1.45). Otherwise, labs are unremarkable.       Son is present at bedside states that she lives at home alone but there are cameras all over home and he lives 2 minutes away.  There are several siblings that help take  care for.    He states that on Friday she was in her normal state of health but on Saturday when he went home it seemed as if she had fallen on the floor, when he went there she was awake and alert attempting to get up.    This morning patient is combative completely disoriented and difficult to redirect.  CT of the head shows no acute abnormalities.     Family would like NH placement.   Interval Hx:   8/23/23-No acute events reported overnight, patient was resting comfortably on bed, easily arousable, sons were at bedside.  Patient appears confused.  Reported patient was agitated last night pulled out of her peripheral IV received Ativan.    Per discussion with the son who was at bedside, patient at baseline has intermittent confusion lately but currently she is definitely not at her baseline.      08/24/2023 Dr. Watt-chart reviewed patient examined.  Son in room.  Patient appears to be at baseline.   working on nursing home placement.  All electrolytes at baseline/within normal limits    08/25/2023 Dr. Watt-chart reviewed patient examined.  Son in room earlier today.  Patient voices no complaints.   looking into [facility] for sniff placement    8/26/23 dr watt - no new issues/ awaiting placement                  Case was discussed with patient's nurse and  on the floor.    Objective/physical exam:  General: In no acute distress, afebrile  Chest:  Unlabored breathing   Heart:  Normal rate   Abdomen: Soft, nontender, BS +  MSK: Warm, no lower extremity edema, no clubbing or cyanosis  Neurologic:  Easily arousable, confused moving all extremities spontaneously    VITAL SIGNS: 24 HRS MIN & MAX LAST   Temp  Min: 97.6 °F (36.4 °C)  Max: 98.9 °F (37.2 °C) 98.3 °F (36.8 °C)   BP  Min: 118/70  Max: 174/105 (!) 152/87   Pulse  Min: 80  Max: 107  107   Resp  Min: 17  Max: 18 18   SpO2  Min: 92 %  Max: 97 % 95 %     I have reviewed the following labs:  Recent Labs   Lab  08/19/23 2314 08/20/23 0432 08/24/23  0713   WBC 6.08 5.03 4.46*   RBC 3.78* 4.03* 4.01*   HGB 11.4* 12.1 12.0   HCT 33.5* 35.9* 35.4*   MCV 88.6 89.1 88.3   MCH 30.2 30.0 29.9   MCHC 34.0 33.7 33.9   RDW 14.1 14.0 13.9   * 116* 137   MPV 11.6* 11.4* 11.8*       Recent Labs   Lab 08/19/23 2314 08/20/23 0432 08/24/23  0714 08/26/23  0539    141 142  --    K 3.2* 3.3* 3.6  --    CO2 27 26 29  --    BUN 21.7* 21.4* 15.8  --    CREATININE 1.51* 1.45* 0.86 0.85   CALCIUM 9.1 9.2 8.9  --    MG 2.00 2.10 1.70  --    ALBUMIN 3.7 3.8  --   --    ALKPHOS 59 62  --   --    ALT 13 14  --   --    AST 30 30  --   --    BILITOT 0.7 0.7  --   --        Microbiology Results (last 7 days)       ** No results found for the last 168 hours. **             See below for Radiology    Scheduled Med:   aspirin  81 mg Oral Daily    enoxparin  30 mg Subcutaneous Daily    lisinopriL  20 mg Oral Daily    QUEtiapine  25 mg Oral QHS      Continuous Infusions:   lactated ringers 75 mL/hr at 08/24/23 0418      PRN Meds:  acetaminophen, aluminum-magnesium hydroxide-simethicone, diphenhydrAMINE, hydrALAZINE, lorazepam, melatonin, naloxone, ondansetron, polyethylene glycol, prochlorperazine, simethicone     Assessment/Plan:  Unwitnessed fall etiology unknown    -CT and MRI brain without acute abnormalities    dementia    Hypokalemia -resolved    SUBHASH on CKD stage 3a    Normocytic anemia     History of hypertension, dementia and vitamin-D deficiency       PT/OT consulted.  [facility]    EEG mild generalized slowing    on Seroquel low-dose for better control of agitations avoid Benadryl benzos      Case discussed with patient's nurse, case management- [facility] for snf    VTE prophylaxis:  Lovenox    Patient condition:  Fair    Anticipated discharge and Disposition:  placement at  /nh      _____________________________________________________________________    Nutrition Status:    Radiology:  I have personally reviewed the following imaging and agree with the radiologist.     MRI Brain Without Contrast  Narrative: EXAMINATION:  MRI BRAIN WITHOUT CONTRAST    CLINICAL HISTORY:  Mental status change, unknown cause;    TECHNIQUE:  Multiplanar MR imaging of the brain was performed without contrast.    COMPARISON:  CT 08/19/2023    FINDINGS:  No acute infarct, mass effect or hemorrhage.  White matter FLAIR hyperintensities are nonspecific but likely related to small vessel ischemic disease.  Ventricles and sulci are proportionate.    No abnormal extra-axial fluid collections.    Flow voids are maintained in the intracranial arteries and dural venous sinuses.    Skull base and calvarium have a normal signal.  Impression: No acute findings in the brain.    Findings are compatible with the preliminary report.    Electronically signed by: Dylan Gutierrez MD  Date:    08/21/2023  Time:    09:10      Abdelrahman Watt MD   08/26/2023

## 2023-08-27 NOTE — PROGRESS NOTES
[facility]  Hospital Medicine Progress Note        Chief Complaint: Inpatient Follow-up     HPI:   89 y.o. Black or  female with a past medical history of hypertension, dementia, CKD stage IIIA and vitamin-D deficiency. The patient presented to [facility] on 8/19/2023 following a and unwitnessed fall.  On exam patient reports having shortness a breath at times but denies complaints of headache, vision changes, chest pain, abdominal pain, nausea, vomiting, diarrhea and burning with urination.  She states she does not remember the fall and therefore unable to give further details. At baseline patient lives alone, walks independently and completes activities of daily living on her own.     Upon presentation to the ED, temperature 98.3° F, heart rate 80, blood pressure 132/71, respiratory rate 18 and SpO2 96% on room air.  Labs with H&H 11.4/33.5, potassium 3.2, BUN/creatinine 21.7/1.51, troponin 0.039.  UA negative for infection.  EKG sinus rhythm with marked sinus arrhythmia and right bundle-branch block.  X-ray of the left hip and left knee without any acute osseous findings.  Preliminary read of CT of the head revealed no acute intracranial traumatic injury.  Preliminary read CT of cervical spine with no acute cervical spine fracture, dislocation or subluxation but degenerative changes.  In the ED patient received 20 mEq of IV potassium chloride.  Patient is admitted to hospital medicine services for further medical management.        Patient presents for a fall without associated symptoms.   Noted to have a history of hypertension   Labs reviewed, she is slightly hypokalemic and and renal function appears to be at or near baseline (Cr in Feb 1.56, today it is 1.45). Otherwise, labs are unremarkable.       Son is present at bedside states that she lives at home alone but there are cameras all over home and he lives 2 minutes away.  There are several siblings that help take  care for.    He states that on Friday she was in her normal state of health but on Saturday when he went home it seemed as if she had fallen on the floor, when he went there she was awake and alert attempting to get up.    This morning patient is combative completely disoriented and difficult to redirect.  CT of the head shows no acute abnormalities.     Family would like NH placement.   Interval Hx:   8/23/23-No acute events reported overnight, patient was resting comfortably on bed, easily arousable, sons were at bedside.  Patient appears confused.  Reported patient was agitated last night pulled out of her peripheral IV received Ativan.    Per discussion with the son who was at bedside, patient at baseline has intermittent confusion lately but currently she is definitely not at her baseline.      08/24/2023 Dr. Watt-chart reviewed patient examined.  Son in room.  Patient appears to be at baseline.   working on nursing home placement.  All electrolytes at baseline/within normal limits    08/25/2023 Dr. Watt-chart reviewed patient examined.  Son in room earlier today.  Patient voices no complaints.   looking into [facility] for sniff placement    8/26/23 dr watt - no new issues/ awaiting placement     08/27/2023 Dr. Watt-no new problems/awaiting placement.  Vital signs are stable                 Case was discussed with patient's nurse and  on the floor.    Objective/physical exam:  General: In no acute distress, afebrile  Chest:  Unlabored breathing   Heart:  Normal rate   Abdomen: Soft, nontender, BS +  MSK: Warm, no lower extremity edema, no clubbing or cyanosis  Neurologic:  Easily arousable, confused moving all extremities spontaneously    VITAL SIGNS: 24 HRS MIN & MAX LAST   Temp  Min: 98.1 °F (36.7 °C)  Max: 98.8 °F (37.1 °C) 98.6 °F (37 °C)   BP  Min: 137/82  Max: 152/87 (!) 146/81   Pulse  Min: 87  Max: 107  87   Resp  Min: 18  Max: 18 18   SpO2  Min: 94  %  Max: 96 % 96 %     I have reviewed the following labs:  Recent Labs   Lab 08/24/23  0713   WBC 4.46*   RBC 4.01*   HGB 12.0   HCT 35.4*   MCV 88.3   MCH 29.9   MCHC 33.9   RDW 13.9      MPV 11.8*       Recent Labs   Lab 08/24/23  0714 08/26/23  0539     --    K 3.6  --    CO2 29  --    BUN 15.8  --    CREATININE 0.86 0.85   CALCIUM 8.9  --    MG 1.70  --        Microbiology Results (last 7 days)       Procedure Component Value Units Date/Time    Wound Culture [[id number]] Collected: 08/27/23 1307    Order Status: Sent Specimen: Drainage from Arm, Left Updated: 08/27/23 1313             See below for Radiology    Scheduled Med:   aspirin  81 mg Oral Daily    enoxparin  30 mg Subcutaneous Daily    lisinopriL  30 mg Oral Daily    QUEtiapine  25 mg Oral QHS      Continuous Infusions:   lactated ringers 75 mL/hr at 08/27/23 0957      PRN Meds:  acetaminophen, aluminum-magnesium hydroxide-simethicone, diphenhydrAMINE, hydrALAZINE, lorazepam, melatonin, naloxone, ondansetron, polyethylene glycol, prochlorperazine, simethicone     Assessment/Plan:  Unwitnessed fall etiology unknown    -CT and MRI brain without acute abnormalities    dementia    Hypokalemia -resolved    SUBHASH on CKD stage 3a    Normocytic anemia     History of hypertension, dementia and vitamin-D deficiency       PT/OT consulted.  Recommended skilled nursing facility([facility] pending)    EEG mild generalized slowing    on Seroquel low-dose for better control of agitations avoid Benadryl benzos      Case discussed with patient's nurse, case management- [facility] for snf    VTE prophylaxis:  Lovenox    Patient condition:  Fair    Anticipated discharge and Disposition:  placement at snf/nh      _____________________________________________________________________    Nutrition Status:    Radiology:  I have personally reviewed the following imaging and agree with the radiologist.     MRI Brain Without Contrast  Narrative:  EXAMINATION:  MRI BRAIN WITHOUT CONTRAST    CLINICAL HISTORY:  Mental status change, unknown cause;    TECHNIQUE:  Multiplanar MR imaging of the brain was performed without contrast.    COMPARISON:  CT 08/19/2023    FINDINGS:  No acute infarct, mass effect or hemorrhage.  White matter FLAIR hyperintensities are nonspecific but likely related to small vessel ischemic disease.  Ventricles and sulci are proportionate.    No abnormal extra-axial fluid collections.    Flow voids are maintained in the intracranial arteries and dural venous sinuses.    Skull base and calvarium have a normal signal.  Impression: No acute findings in the brain.    Findings are compatible with the preliminary report.    Electronically signed by: Dylan Gutierrez MD  Date:    08/21/2023  Time:    09:10      Abdelrahman Watt MD   08/27/2023

## 2023-08-28 PROCEDURE — G0378 HOSPITAL OBSERVATION PER HR: HCPCS

## 2023-08-28 PROCEDURE — 25000003 PHARM REV CODE 250: Performed by: INTERNAL MEDICINE

## 2023-08-28 PROCEDURE — 25000003 PHARM REV CODE 250: Performed by: STUDENT IN AN ORGANIZED HEALTH CARE EDUCATION/TRAINING PROGRAM

## 2023-08-28 PROCEDURE — 97110 THERAPEUTIC EXERCISES: CPT | Mod: CQ

## 2023-08-28 PROCEDURE — 96372 THER/PROPH/DIAG INJ SC/IM: CPT | Performed by: INTERNAL MEDICINE

## 2023-08-28 PROCEDURE — 97116 GAIT TRAINING THERAPY: CPT | Mod: CQ

## 2023-08-28 PROCEDURE — 96361 HYDRATE IV INFUSION ADD-ON: CPT

## 2023-08-28 PROCEDURE — 63600175 PHARM REV CODE 636 W HCPCS: Performed by: INTERNAL MEDICINE

## 2023-08-28 RX ORDER — ENOXAPARIN SODIUM 100 MG/ML
40 INJECTION SUBCUTANEOUS EVERY 24 HOURS
Status: DISCONTINUED | OUTPATIENT
Start: 2023-08-29 | End: 2023-08-30 | Stop reason: HOSPADM

## 2023-08-28 RX ADMIN — ASPIRIN 81 MG CHEWABLE TABLET 81 MG: 81 TABLET CHEWABLE at 08:08

## 2023-08-28 RX ADMIN — ENOXAPARIN SODIUM 30 MG: 30 INJECTION SUBCUTANEOUS at 04:08

## 2023-08-28 RX ADMIN — LISINOPRIL 30 MG: 20 TABLET ORAL at 08:08

## 2023-08-28 RX ADMIN — QUETIAPINE FUMARATE 25 MG: 25 TABLET ORAL at 08:08

## 2023-08-28 NOTE — PT/OT/SLP PROGRESS
Physical Therapy Treatment    Patient Name:  Danielle Loera   MRN:  [id number]    Recommendations:     Discharge Recommendations: nursing facility, skilled  Discharge Equipment Recommendations: to be determined by next level of care  Barriers to discharge: Impaired mobility    Assessment:     Danielle Loera is a 89 y.o. female admitted with a medical diagnosis of <principal problem not specified>.  She presents with the following impairments/functional limitations: weakness, impaired endurance, impaired self care skills, impaired functional mobility, gait instability, impaired balance, impaired cognition, decreased upper extremity function, decreased ROM, decreased safety awareness .    Rehab Prognosis: Good; patient would benefit from acute skilled PT services to address these deficits and reach maximum level of function.    Recent Surgery: * No surgery found *      Plan:     During this hospitalization, patient to be seen 5 x/week to address the identified rehab impairments via gait training, therapeutic activities, therapeutic exercises and progress toward the following goals:    Plan of Care Expires:  09/22/23        Objective:     Communicated with NSG prior to session.  Patient found HOB elevated with hip abduction brace upon PT entry to room.     General Precautions: Standard, fall  Orthopedic Precautions:    Braces: N/A  Respiratory Status: Room air  Skin Integrity: Visible skin intact      Functional Mobility:  Bed Mobility:     Supine to Sit: minimum assistance  Transfers:     Sit to Stand:  minimum assistance with no AD  Gait: pt amb ~100ft x 2 bouts. Slow but steady step through gait pattern with RW. Min c RW.      Patient left up in chair with all lines intact and call button in reach..    GOALS:   Multidisciplinary Problems       Physical Therapy Goals          Problem: Physical Therapy    Goal Priority Disciplines Outcome Goal Variances Interventions   Physical Therapy Goal     PT, PT/OT  Ongoing, Progressing     Description: Goals to be met by: 23     Patient will increase functional independence with mobility by performin. Supine to sit with Contact Guard Assistance  2. Sit to stand transfer with Contact Guard Assistance  3. Bed to chair transfer with Contact Guard Assistance using Rolling Walker  4. Gait  x 300 feet with Contact Guard Assistance using Rolling Walker.                          Time Tracking:     PT Received On:    PT Start Time: 1200     PT Stop Time: 1224  PT Total Time (min): 24 min         Treatment Type: Treatment  PT/PTA: PTA     Number of PTA visits since last PT visit: 4     2023

## 2023-08-28 NOTE — PROGRESS NOTES
"Inpatient Nutrition Evaluation    Admit Date: 8/19/2023   Total duration of encounter: 9 days    Nutrition Recommendation/Prescription     -Continue heart healthy diet.   -Continue strawberry Boost (provides 240 kcal, 10 g protein per serving)     Nutrition Assessment     Chart Review    Reason Seen: malnutrition screening tool (MST) and follow-up    Malnutrition Screening Tool Results   Have you recently lost weight without trying?: Yes: 2-13 lbs  Have you been eating poorly because of a decreased appetite?: Yes   MST Score: 2     Diagnosis:  Unwitnessed fall etiology unknown   Hypokalemia, resolved  SUBHASH on CKD stage 3a  Normocytic anemia     Relevant Medical History: hypertension, dementia, CKD stage IIIA and vitamin-D deficiency    Nutrition-Related Medications: LR @ 75ml/hr    Nutrition-Related Labs:  8/20/23 K 3.3, BUN 21.4, Crea 1.45, eGFR 94  8/28/23 no updated labs     Diet Order: Diet heart healthy  Oral Supplement Order: none  Appetite/Oral Intake: good/% of meals  Factors Affecting Nutritional Intake: none identified  Food/Protestant/Cultural Preferences: none reported  Food Allergies: none reported    Skin Integrity: puncture  Wound(s):       Comments    8/23/23 Pt sleeping, son at bedside gave information. States pt with decreased appetite since here, sleeping most of the time. He will encourage Boost supplement when awake. States pt had gradual 9lbs wt loss from a 3-4 month time frame prior to admit. Weight hx from May 2023 of 73.1kg to 69kg in August indicating 5% wt loss x3 months, not significant. No muscle or fat loss noted. Will follow up early.     8/28/23 Pt and son at bedside reports good appetite, has been eating well and drinking Boost supplements. She denies any GI complaints. Last BM 8/21.    Anthropometrics    Height: 5' 7" (170.2 cm)    Last Weight: 75.3 kg (166 lb) (08/21/23 1901) Weight Method: Bed Scale  BMI (Calculated): 26  BMI Classification: overweight (BMI 25-29.9)     Ideal " Body Weight (IBW), Female: 135 lb     % Ideal Body Weight, Female (lb): 122.96 %                    Usual Body Weight (UBW), k.18 kg  % Usual Body Weight: 103.11     Usual Weight Provided By: family/caregiver    Wt Readings from Last 5 Encounters:   23 75.3 kg (166 lb)   23 69 kg (152 lb 1.9 oz)   23 73.1 kg (161 lb 3.2 oz)   23 73.7 kg (162 lb 7.7 oz)     Weight Change(s) Since Admission:  Admit Weight: 75.3 kg (166 lb) (23 2142)  23 75.3kg admit wt  23 no updated wts    Patient Education    Not applicable.    Monitoring & Evaluation     Dietitian will monitor energy intake and weight change.  Nutrition Risk/Follow-Up: low (follow-up in 5-7 days)  Patients assigned 'low nutrition risk' status do not qualify for a full nutritional assessment but will be monitored and re-evaluated in a 5-7 day time period. Please consult if re-evaluation needed sooner.

## 2023-08-28 NOTE — PLAN OF CARE
Per CM pt has been out of restraints all weekend txt Rojas over @ [facility] updating him with the info rec'vd also sent updated clinicals awaiting a cb on further plans with DC

## 2023-08-28 NOTE — PLAN OF CARE
Being [facility] is down at the moment I faxed over a referral for snf placement at [facility] awaiting a response called Janeth @ [facility] she stated she would review thpt.

## 2023-08-29 PROCEDURE — 25000003 PHARM REV CODE 250: Performed by: INTERNAL MEDICINE

## 2023-08-29 PROCEDURE — 96372 THER/PROPH/DIAG INJ SC/IM: CPT | Performed by: INTERNAL MEDICINE

## 2023-08-29 PROCEDURE — 97164 PT RE-EVAL EST PLAN CARE: CPT

## 2023-08-29 PROCEDURE — 63600175 PHARM REV CODE 636 W HCPCS: Performed by: INTERNAL MEDICINE

## 2023-08-29 PROCEDURE — 97535 SELF CARE MNGMENT TRAINING: CPT | Mod: CO

## 2023-08-29 PROCEDURE — 25000003 PHARM REV CODE 250: Performed by: STUDENT IN AN ORGANIZED HEALTH CARE EDUCATION/TRAINING PROGRAM

## 2023-08-29 PROCEDURE — G0378 HOSPITAL OBSERVATION PER HR: HCPCS

## 2023-08-29 PROCEDURE — 97530 THERAPEUTIC ACTIVITIES: CPT | Mod: CO

## 2023-08-29 RX ADMIN — ASPIRIN 81 MG CHEWABLE TABLET 81 MG: 81 TABLET CHEWABLE at 08:08

## 2023-08-29 RX ADMIN — ENOXAPARIN SODIUM 40 MG: 40 INJECTION SUBCUTANEOUS at 04:08

## 2023-08-29 RX ADMIN — QUETIAPINE FUMARATE 25 MG: 25 TABLET ORAL at 07:08

## 2023-08-29 RX ADMIN — LISINOPRIL 30 MG: 20 TABLET ORAL at 08:08

## 2023-08-29 NOTE — PLAN OF CARE
Problem: Adult Inpatient Plan of Care  Goal: Plan of Care Review  Outcome: Ongoing, Progressing  Goal: Patient-Specific Goal (Individualized)  Outcome: Ongoing, Progressing  Goal: Absence of Hospital-Acquired Illness or Injury  Outcome: Ongoing, Progressing  Goal: Optimal Comfort and Wellbeing  Outcome: Ongoing, Progressing     Problem: Fall Injury Risk  Goal: Absence of Fall and Fall-Related Injury  Outcome: Ongoing, Progressing     Problem: Skin Injury Risk Increased  Goal: Skin Health and Integrity  Outcome: Ongoing, Progressing     Problem: Impaired Wound Healing  Goal: Optimal Wound Healing  Outcome: Ongoing, Progressing

## 2023-08-29 NOTE — PT/OT/SLP RE-EVAL
Physical Therapy Re-Evaluation    Patient Name:  Danielle Loera   MRN:  [id number]    Recommendations:     Discharge Recommendations: nursing facility, skilled   Discharge Equipment Recommendations: wheelchair   Barriers to discharge: Decreased caregiver support and Impaired mobility    Assessment:     Danielle Loera is a 89 y.o. female admitted with a medical diagnosis of Unwitnessed fall, Syncope, Hypokalemia, SUBHASH on CKD stage 3a, Normocytic anemia, history of hypertension, dementia and vitamin-D deficiency.  She presents with the following impairments/functional limitations: weakness, impaired endurance, impaired self care skills, impaired functional mobility, gait instability, impaired balance, impaired cognition, decreased upper extremity function, decreased lower extremity function, decreased safety awareness. Pt with improved alertness but requires constant verbal cuing and assistance to remain on task. Due to progressive dementia potential for return to independence is limited and will require ongoing 24 hr care.    Rehab Prognosis: Fair; patient would benefit from acute skilled PT services to address these deficits and reach maximum level of function.    Recent Surgery: * No surgery found *      Plan:     During this hospitalization, patient to be seen 5 x/week to address the identified rehab impairments via gait training, therapeutic activities, therapeutic exercises and progress toward the following goals:    Plan of Care Expires:  09/22/23    Subjective     Chief Complaint: none  Patient/Family Comments/goals: none stated  Pain/Comfort:  Pain Rating 1: 0/10  Pain Rating Post-Intervention 1: 0/10    Patients cultural, spiritual, Yazdanism conflicts given the current situation: no    Objective:     Communicated with nurse prior to session.  Patient found HOB elevated with peripheral IV, telemetry  upon PT entry to room.    General Precautions: Standard, fall  Orthopedic Precautions:N/A   Braces:  N/A  Respiratory Status: Room air      Exams:  Cognitive Exam:  Patient is oriented to Person, Place  RLE ROM: WFL  RLE Strength: WFL  LLE ROM: WFL  LLE Strength: WFL  Skin integrity: Visible skin intact      Functional Mobility:  Bed Mobility:     Supine to Sit: minimum assistance  Sit to Supine: minimum assistance  Transfers:     Sit to Stand:  moderate assistance with rolling walker  Gait: 10ft with RW, assist to advance RW, step to gait pattern. Pt voided on floor. Returned to chair and brought back into room  Balance: Supported standing balance with RW mod A to complete pericare.      AM-PAC 6 CLICK MOBILITY  Total Score:11       Patient provided with verbal education regarding sequencing of all tasks.  Understanding was verbalized, however additional teaching warranted.     Patient left HOB elevated with all lines intact, call button in reach, and bed alarm on.    GOALS:   Multidisciplinary Problems       Physical Therapy Goals          Problem: Physical Therapy    Goal Priority Disciplines Outcome Goal Variances Interventions   Physical Therapy Goal     PT, PT/OT Ongoing, Not Progressing     Description: Goals to be met by: 23     Patient will increase functional independence with mobility by performin. Supine to sit with Contact Guard Assistance  2. Sit to stand transfer with Contact Guard Assistance  3. Bed to chair transfer with Contact Guard Assistance using Rolling Walker  4. Gait  x 300 feet with Contact Guard Assistance using Rolling Walker.                          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    Time Tracking:     PT Received On: 23  PT Start Time: 1424     PT Stop Time: 1440  PT  Total Time (min): 16 min     Billable Minutes: Re-eval 16 min      08/29/2023

## 2023-08-29 NOTE — PT/OT/SLP PROGRESS
Physical Therapy  Treatment    Danielle Loera   MRN: [id number]   Admitting Diagnosis: <principal problem not specified>       PT Start Time: 1040     PT Stop Time: 1105    PT Total Time (min): 25 min       Billable Minutes:  Gait Training 13 and Therapeutic Exercise 12    Treatment Type: Treatment  PT/PTA: PTA     Number of PTA visits since last PT visit: 5       General Precautions: Standard, fall  Orthopedic Precautions:    Braces: N/A  Respiratory Status: Room air    Spiritual, Cultural Beliefs, Shinto Practices, Values that Affect Care: no    Subjective:  Communicated with NSG prior to session.  Pt wake and agreeable to session upon PTA arrival.         Objective:        Functional Mobility:  Bed Mobility: Min A supine to sit.       Transfers:   Mod A to come to standing from sitting. RW    Gait: Pt ambulated ~80ft x 2 bouts. Pt demos an antalgic gait initially reporting her R foot hurt. Pt son reports pt has a bad knee and will occasionally c/o pain because of it. Pt began to ambulate with a step through gait with increased distance. Slow but steady. RW    CARO LE seated therex x 10 reps       AM-PAC 6 CLICK MOBILITY  How much help from another person does this patient currently need?   1 = Unable, Total/Dependent Assistance  2 = A lot, Maximum/Moderate Assistance  3 = A little, Minimum/Contact Guard/Supervision  4 = None, Modified [place]/Independent         AM-PAC Raw Score CMS G-Code Modifier Level of Impairment Assistance   6 % Total / Unable   7 - 9 CM 80 - 100% Maximal Assist   10 - 14 CL 60 - 80% Moderate Assist   15 - 19 CK 40 - 60% Moderate Assist   20 - 22 CJ 20 - 40% Minimal Assist   23 CI 1-20% SBA / CGA   24 CH 0% Independent/ Mod I     Patient left up in chair with all lines intact and call button in reach.      Rehab identified problem list/impairments: weakness, impaired endurance, impaired self care skills, impaired functional mobility, gait instability, impaired balance,  impaired cognition, decreased upper extremity function, decreased ROM, decreased safety awareness    Rehab potential is excellent.    Activity tolerance: Excellent    Discharge recommendations: nursing facility, skilled      Barriers to discharge:      Equipment recommendations: to be determined by next level of care     GOALS:   Multidisciplinary Problems       Physical Therapy Goals          Problem: Physical Therapy    Goal Priority Disciplines Outcome Goal Variances Interventions   Physical Therapy Goal     PT, PT/OT Ongoing, Progressing     Description: Goals to be met by: 23     Patient will increase functional independence with mobility by performin. Supine to sit with Contact Guard Assistance  2. Sit to stand transfer with Contact Guard Assistance  3. Bed to chair transfer with Contact Guard Assistance using Rolling Walker  4. Gait  x 300 feet with Contact Guard Assistance using Rolling Walker.                          PLAN:    Patient to be seen 5 x/week to address the above listed problems via gait training, therapeutic activities, therapeutic exercises  Plan of Care expires: 23  Plan of Care reviewed with: patient         2023

## 2023-08-29 NOTE — PROGRESS NOTES
[facility]  Hospital Medicine Progress Note        Chief Complaint: Inpatient Follow-up     HPI:   89 y.o. Black or  female with a past medical history of hypertension, dementia, CKD stage IIIA and vitamin-D deficiency. The patient presented to [facility] on 8/19/2023 following a and unwitnessed fall.  On exam patient reports having shortness a breath at times but denies complaints of headache, vision changes, chest pain, abdominal pain, nausea, vomiting, diarrhea and burning with urination.  She states she does not remember the fall and therefore unable to give further details. At baseline patient lives alone, walks independently and completes activities of daily living on her own.     Upon presentation to the ED, temperature 98.3° F, heart rate 80, blood pressure 132/71, respiratory rate 18 and SpO2 96% on room air.  Labs with H&H 11.4/33.5, potassium 3.2, BUN/creatinine 21.7/1.51, troponin 0.039.  UA negative for infection.  EKG sinus rhythm with marked sinus arrhythmia and right bundle-branch block.  X-ray of the left hip and left knee without any acute osseous findings.  Preliminary read of CT of the head revealed no acute intracranial traumatic injury.  Preliminary read CT of cervical spine with no acute cervical spine fracture, dislocation or subluxation but degenerative changes.  In the ED patient received 20 mEq of IV potassium chloride.  Patient is admitted to hospital medicine services for further medical management.        Patient presents for a fall without associated symptoms.   Noted to have a history of hypertension   Labs reviewed, she is slightly hypokalemic and and renal function appears to be at or near baseline (Cr in Feb 1.56, today it is 1.45). Otherwise, labs are unremarkable.       Son is present at bedside states that she lives at home alone but there are cameras all over home and he lives 2 minutes away.  There are several siblings that help take  care for.    He states that on Friday she was in her normal state of health but on Saturday when he went home it seemed as if she had fallen on the floor, when he went there she was awake and alert attempting to get up.    This morning patient is combative completely disoriented and difficult to redirect.  CT of the head shows no acute abnormalities.     Family would like NH placement.   Interval Hx:   8/23/23-No acute events reported overnight, patient was resting comfortably on bed, easily arousable, sons were at bedside.  Patient appears confused.  Reported patient was agitated last night pulled out of her peripheral IV received Ativan.    Per discussion with the son who was at bedside, patient at baseline has intermittent confusion lately but currently she is definitely not at her baseline.      08/24/2023 Dr. Watt-chart reviewed patient examined.  Son in room.  Patient appears to be at baseline.   working on nursing home placement.  All electrolytes at baseline/within normal limits    08/25/2023 Dr. Watt-chart reviewed patient examined.  Son in room earlier today.  Patient voices no complaints.   looking into [facility] for sniff placement    8/26/23 dr watt - no new issues/ awaiting placement     08/27/2023 Dr. Watt-no new problems/awaiting placement.  Vital signs are stable    8-28-23 awaiting placement. No new problems    8/29/23 dr watt -another son In room. No new issues/attempting to get her to [facility]               Case was discussed with patient's nurse and  on the floor.    Objective/physical exam:  General: In no acute distress, afebrile  Chest:  cta   Heart:  Normal rate   Abdomen: Soft, nontender, BS +  MSK: Warm, no lower extremity edema, no clubbing or cyanosis  Neurologic:  Easily arousable, confused moving all extremities spontaneously    VITAL SIGNS: 24 HRS MIN & MAX LAST   Temp  Min: 97.5 °F (36.4 °C)  Max: 98.8 °F (37.1 °C)  97.5 °F (36.4 °C)   BP  Min: 120/61  Max: 178/80 137/65   Pulse  Min: 79  Max: 88  79   Resp  Min: 18  Max: 18 18   SpO2  Min: 95 %  Max: 97 % 96 %     I have reviewed the following labs:  Recent Labs   Lab 08/24/23  0713   WBC 4.46*   RBC 4.01*   HGB 12.0   HCT 35.4*   MCV 88.3   MCH 29.9   MCHC 33.9   RDW 13.9      MPV 11.8*       Recent Labs   Lab 08/24/23  0714 08/26/23  0539     --    K 3.6  --    CO2 29  --    BUN 15.8  --    CREATININE 0.86 0.85   CALCIUM 8.9  --    MG 1.70  --        Microbiology Results (last 7 days)       Procedure Component Value Units Date/Time    Wound Culture [[id number]] Collected: 08/27/23 1307    Order Status: Completed Specimen: Drainage from Arm, Left Updated: 08/29/23 0932     Wound Culture Normal Skin Karen, no further workup.             See below for Radiology    Scheduled Med:   aspirin  81 mg Oral Daily    enoxparin  40 mg Subcutaneous Daily    lisinopriL  30 mg Oral Daily    QUEtiapine  25 mg Oral QHS      Continuous Infusions:       PRN Meds:  acetaminophen, aluminum-magnesium hydroxide-simethicone, diphenhydrAMINE, hydrALAZINE, lorazepam, melatonin, naloxone, ondansetron, polyethylene glycol, prochlorperazine, simethicone     Assessment/Plan:  Unwitnessed fall etiology unknown    -CT and MRI brain without acute abnormalities    dementia    Hypokalemia -resolved    SUBHASH on CKD stage 3a      Normocytic anemia     History of hypertension, dementia and vitamin-D deficiency       PT/OT consulted.  Recommended skilled nursing facility([facility] pending)    EEG mild generalized slowing    on Seroquel low-dose at night  for better control of agitations avoid Benadryl benzos      Case discussed with patient's nurse, case management- [facility] for snf      Overall she has been more cooperative w HD     VTE prophylaxis:  Lovenox    Patient condition:  Fair    Anticipated discharge and Disposition:  placement at  [facility]/nh      _____________________________________________________________________    Nutrition Status:    Radiology:  I have personally reviewed the following imaging and agree with the radiologist.     CV Ultrasound doppler venous arm left  There was no evidence of deep vein thrombosis in the left upper extremity.     A superficial thrombosis was identified in the left antecubital cephalic   vein.      Abdelrahman Watt MD   08/29/2023

## 2023-08-29 NOTE — PROGRESS NOTES
[facility]  Hospital Medicine Progress Note        Chief Complaint: Inpatient Follow-up     HPI:   89 y.o. Black or  female with a past medical history of hypertension, dementia, CKD stage IIIA and vitamin-D deficiency. The patient presented to [facility] on 8/19/2023 following a and unwitnessed fall.  On exam patient reports having shortness a breath at times but denies complaints of headache, vision changes, chest pain, abdominal pain, nausea, vomiting, diarrhea and burning with urination.  She states she does not remember the fall and therefore unable to give further details. At baseline patient lives alone, walks independently and completes activities of daily living on her own.     Upon presentation to the ED, temperature 98.3° F, heart rate 80, blood pressure 132/71, respiratory rate 18 and SpO2 96% on room air.  Labs with H&H 11.4/33.5, potassium 3.2, BUN/creatinine 21.7/1.51, troponin 0.039.  UA negative for infection.  EKG sinus rhythm with marked sinus arrhythmia and right bundle-branch block.  X-ray of the left hip and left knee without any acute osseous findings.  Preliminary read of CT of the head revealed no acute intracranial traumatic injury.  Preliminary read CT of cervical spine with no acute cervical spine fracture, dislocation or subluxation but degenerative changes.  In the ED patient received 20 mEq of IV potassium chloride.  Patient is admitted to hospital medicine services for further medical management.        Patient presents for a fall without associated symptoms.   Noted to have a history of hypertension   Labs reviewed, she is slightly hypokalemic and and renal function appears to be at or near baseline (Cr in Feb 1.56, today it is 1.45). Otherwise, labs are unremarkable.       Son is present at bedside states that she lives at home alone but there are cameras all over home and he lives 2 minutes away.  There are several siblings that help take  care for.    He states that on Friday she was in her normal state of health but on Saturday when he went home it seemed as if she had fallen on the floor, when he went there she was awake and alert attempting to get up.    This morning patient is combative completely disoriented and difficult to redirect.  CT of the head shows no acute abnormalities.     Family would like NH placement.   Interval Hx:   8/23/23-No acute events reported overnight, patient was resting comfortably on bed, easily arousable, sons were at bedside.  Patient appears confused.  Reported patient was agitated last night pulled out of her peripheral IV received Ativan.    Per discussion with the son who was at bedside, patient at baseline has intermittent confusion lately but currently she is definitely not at her baseline.      08/24/2023 Dr. Watt-chart reviewed patient examined.  Son in room.  Patient appears to be at baseline.   working on nursing home placement.  All electrolytes at baseline/within normal limits    08/25/2023 Dr. Watt-chart reviewed patient examined.  Son in room earlier today.  Patient voices no complaints.   looking into [facility] for sniff placement    8/26/23 dr watt - no new issues/ awaiting placement     08/27/2023 Dr. Watt-no new problems/awaiting placement.  Vital signs are stable    8-28-23 awaiting placement. No new problems                 Case was discussed with patient's nurse and  on the floor.    Objective/physical exam:  General: In no acute distress, afebrile  Chest:  Unlabored breathing   Heart:  Normal rate   Abdomen: Soft, nontender, BS +  MSK: Warm, no lower extremity edema, no clubbing or cyanosis  Neurologic:  Easily arousable, confused moving all extremities spontaneously    VITAL SIGNS: 24 HRS MIN & MAX LAST   Temp  Min: 97.9 °F (36.6 °C)  Max: 98.8 °F (37.1 °C) 98.8 °F (37.1 °C)   BP  Min: 137/84  Max: 178/80 (!) 156/89   Pulse  Min: 81  Max: 88   88   Resp  Min: 18  Max: 18 18   SpO2  Min: 95 %  Max: 97 % 97 %     I have reviewed the following labs:  Recent Labs   Lab 08/24/23  0713   WBC 4.46*   RBC 4.01*   HGB 12.0   HCT 35.4*   MCV 88.3   MCH 29.9   MCHC 33.9   RDW 13.9      MPV 11.8*       Recent Labs   Lab 08/24/23  0714 08/26/23  0539     --    K 3.6  --    CO2 29  --    BUN 15.8  --    CREATININE 0.86 0.85   CALCIUM 8.9  --    MG 1.70  --        Microbiology Results (last 7 days)       Procedure Component Value Units Date/Time    Wound Culture [[id number]] Collected: 08/27/23 1307    Order Status: Completed Specimen: Drainage from Arm, Left Updated: 08/28/23 0701     Wound Culture Normal Skin Karen, no further workup.             See below for Radiology    Scheduled Med:   aspirin  81 mg Oral Daily    enoxparin  30 mg Subcutaneous Daily    lisinopriL  30 mg Oral Daily    QUEtiapine  25 mg Oral QHS      Continuous Infusions:       PRN Meds:  acetaminophen, aluminum-magnesium hydroxide-simethicone, diphenhydrAMINE, hydrALAZINE, lorazepam, melatonin, naloxone, ondansetron, polyethylene glycol, prochlorperazine, simethicone     Assessment/Plan:  Unwitnessed fall etiology unknown    -CT and MRI brain without acute abnormalities    dementia    Hypokalemia -resolved    SUBHASH on CKD stage 3a    Normocytic anemia     History of hypertension, dementia and vitamin-D deficiency       PT/OT consulted.  Recommended skilled nursing facility([facility] [place] [facility]    EEG mild generalized slowing    on Seroquel low-dose for better control of agitations avoid Benadryl benzos      Case discussed with patient's nurse, [facility]    VTE prophylaxis:  Lovenox    Patient condition:  Fair    Anticipated discharge and Disposition:  placement at snf/nh      _____________________________________________________________________    Nutrition Status:    Radiology:  I have personally reviewed the following imaging and agree with  the radiologist.     CV Ultrasound doppler venous arm left  There was no evidence of deep vein thrombosis in the left upper extremity.     A superficial thrombosis was identified in the left antecubital cephalic   vein.      Abdelrahman Watt MD   08/28/2023

## 2023-08-29 NOTE — PT/OT/SLP PROGRESS
Occupational Therapy   Treatment    Name: Danielle Loera  MRN: [id number]  Admitting Diagnosis:  Syncope, fall       Recommendations:     Discharge Recommendations: nursing facility, skilled  Discharge Equipment Recommendations:     Barriers to discharge:       Assessment:     Danielle Loera is a 89 y.o. female with a medical diagnosis of syncope, fall. Performance deficits affecting function are weakness, gait instability, impaired endurance, impaired balance, decreased upper extremity function, decreased lower extremity function, impaired self care skills, impaired functional mobility, decreased safety awareness, impaired cognition.     Rehab Prognosis:  Good; patient would benefit from acute skilled OT services to address these deficits and reach maximum level of function.       Plan:     Patient to be seen 5 x/week to address the above listed problems via self-care/home management, therapeutic activities, therapeutic exercises  Plan of Care Expires: 09/05/23  Plan of Care Reviewed with: patient    Subjective     Pain/Comfort:  Location - Side 1: Right  Location 1: shoulder  Pain Addressed 1: Reposition, Distraction  R knee    Objective:     Communicated with: RN prior to session.  Patient found supine with telemetry, peripheral IV upon OT entry to room.    General Precautions: Standard, fall    Orthopedic Precautions:N/A  Braces: N/A  Respiratory Status: Room air     Occupational Performance:     Bed Mobility:    Patient completed Supine to Sit with moderate assistance  Patient completed Sit to Supine with moderate assistance     Functional Mobility/Transfers:  Patient completed Sit <> Stand Transfer with moderate assistance  with  rolling walker x3 trials from EOB. Only able to maintain stand for ~30 seconds each trial. C/o R knee pain.     Activities of Daily Living:  Grooming: pt completed oral hygiene seated EOB with set-up A.   LE dressing: Max A to don brief in supine.     Therapeutic  Activities:  AAROM to RUE seated EOB. Limited by pain    Therapeutic Positioning    OT interventions performed during the course of today's session in an effort to prevent and/or reduce acquired pressure injuries:   Education on Pressure Ulcer Prevention provided    Skin assessment: all bony prominences were assessed    Findings: no redness or breakdown noted    [facility] 6 Click ADL: 16    Patient Education:  Patient and family provided with verbal education regarding OT role/goals/POC, fall prevention, and safety awareness.  Understanding was verbalized, however additional teaching warranted.      Patient left supine per turn schedule with all lines intact, call button in reach, and son present    GOALS:   Multidisciplinary Problems       Occupational Therapy Goals          Problem: Occupational Therapy    Goal Priority Disciplines Outcome Interventions   Occupational Therapy Goal     OT, PT/OT Ongoing, Progressing    Description: Goals to be met by: 9/5/23     Patient will increase functional independence with ADLs by performing:    UE Dressing with Stand-by Assistance.  LE Dressing with Minimal Assistance and Assistive Devices as needed.  Grooming while standing at sink with Stand-by Assistance.  Toileting from toilet with Minimal Assistance for hygiene and clothing management.                          Time Tracking:     OT Date of Treatment: 08/29/23  OT Start Time: 1006  OT Stop Time: 1030  OT Total Time (min): 24 min    Billable Minutes:Self Care/Home Management 10  Therapeutic Activity 14    OT/JUSTIN: JUSTIN     Number of JUSTIN visits since last OT visit: 3    8/29/2023

## 2023-08-30 VITALS
BODY MASS INDEX: 26.06 KG/M2 | OXYGEN SATURATION: 97 % | WEIGHT: 166 LBS | TEMPERATURE: 98 F | HEART RATE: 74 BPM | SYSTOLIC BLOOD PRESSURE: 141 MMHG | DIASTOLIC BLOOD PRESSURE: 60 MMHG | HEIGHT: 67 IN | RESPIRATION RATE: 18 BRPM

## 2023-08-30 LAB — BACTERIA SPEC CULT: NORMAL

## 2023-08-30 PROCEDURE — 25000003 PHARM REV CODE 250: Performed by: INTERNAL MEDICINE

## 2023-08-30 PROCEDURE — 25000003 PHARM REV CODE 250: Performed by: STUDENT IN AN ORGANIZED HEALTH CARE EDUCATION/TRAINING PROGRAM

## 2023-08-30 PROCEDURE — G0378 HOSPITAL OBSERVATION PER HR: HCPCS

## 2023-08-30 RX ORDER — BENAZEPRIL HYDROCHLORIDE 20 MG/1
20 TABLET ORAL DAILY
Qty: 90 TABLET | Refills: 0 | Status: ON HOLD | OUTPATIENT
Start: 2023-08-30 | End: 2023-10-02 | Stop reason: CLARIF

## 2023-08-30 RX ORDER — CARVEDILOL 3.12 MG/1
3.12 TABLET ORAL 2 TIMES DAILY WITH MEALS
Qty: 60 TABLET | Refills: 11 | Status: SHIPPED | OUTPATIENT
Start: 2023-08-30 | End: 2024-08-29

## 2023-08-30 RX ORDER — CARVEDILOL 3.12 MG/1
3.12 TABLET ORAL 2 TIMES DAILY WITH MEALS
Status: DISCONTINUED | OUTPATIENT
Start: 2023-08-30 | End: 2023-08-30 | Stop reason: HOSPADM

## 2023-08-30 RX ORDER — QUETIAPINE FUMARATE 25 MG/1
25 TABLET, FILM COATED ORAL NIGHTLY
Qty: 30 TABLET | Refills: 0 | Status: ON HOLD | OUTPATIENT
Start: 2023-08-30 | End: 2023-10-02 | Stop reason: CLARIF

## 2023-08-30 RX ORDER — FUROSEMIDE 20 MG/1
20 TABLET ORAL 2 TIMES DAILY
Qty: 60 TABLET | Refills: 11 | Status: ON HOLD | OUTPATIENT
Start: 2023-08-30 | End: 2023-10-27 | Stop reason: SDUPTHER

## 2023-08-30 RX ADMIN — ASPIRIN 81 MG CHEWABLE TABLET 81 MG: 81 TABLET CHEWABLE at 08:08

## 2023-08-30 RX ADMIN — LISINOPRIL 30 MG: 20 TABLET ORAL at 08:08

## 2023-08-30 RX ADMIN — CARVEDILOL 3.12 MG: 3.12 TABLET, FILM COATED ORAL at 11:08

## 2023-08-30 NOTE — DISCHARGE SUMMARY
[facility] Hospital Medicine Discharge Summary    Admit Date: 8/19/2023  Discharge Date and Time: 8/30/20239:16 AM  Admitting Physician: OSCAR Team  Discharging Physician: Abdelrahman Watt MD.  Primary Care Physician: Samra Alfredo MD  Consults: None    Discharge Diagnoses:  Unwitnessed fall etiology unknown   -CT and MRI brain without acute abnormalities       dementia       Hypokalemia -resolved       SUBHASH        Normocytic anemia        History of hypertension, dementia and vitamin-D deficiency        PT/OT consulted.  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 Black or  female with a past medical history of hypertension, dementia, CKD stage IIIA and vitamin-D deficiency. The patient presented to [facility] on [facility]/19/2023 following a and unwitnessed fall.  On exam patient reports having shortness a breath at times but denies complaints of headache, vision changes, chest pain, abdominal pain, nausea, vomiting, diarrhea and burning with urination.  She states she does not remember the fall and therefore unable to give further details. At baseline patient lives alone, walks independently and completes activities of daily living on her own.     Upon presentation to the ED, temperature 98.3° F, heart rate 80, blood pressure 132/71, respiratory rate 18 and SpO2 96% on room air.  Labs with H&H 11.4/33.5, potassium 3.2, BUN/creatinine 21.7/1.51, troponin 0.039.  UA negative for infection.  EKG sinus rhythm with marked sinus arrhythmia and right bundle-branch block.  X-ray of the left hip and left knee without any acute osseous findings.  Preliminary read of CT of the head revealed no acute intracranial traumatic injury.  Preliminary  read CT of cervical spine with no acute cervical spine fracture, dislocation or subluxation but degenerative changes.  In the ED patient received 20 mEq of IV potassium chloride.  Patient is admitted to hospital medicine services for further medical management.    Patient presents for a fall without associated symptoms.     Noted to have a history of hypertension     Labs reviewed, she is slightly hypokalemic and and renal function appears to be at or near baseline (Cr in Feb 1.56, today it is 1.45). Otherwise, labs are unremarkable.      Son is present at bedside states that she lives at home alone but there are cameras all over home and he lives 2 minutes away.  There are several siblings that help take care for.      He states that on Friday she was in her normal state of health but on Saturday when he went home it seemed as if she had fallen on the floor, when he went there she was awake and alert attempting to get up.      This morning patient is combative completely disoriented and difficult to redirect.  CT of the head/neck shows no acute abnormalities.    Patient appears confused.  Reported patient was agitated last night pulled out of her peripheral IV received Ativan.Placed on seroquel 25 mgs po at hs w excellent response.     Per discussion with the son who was at bedside, patient at baseline has intermittent confusion lately but currently she is definitely not at her baseline.      looking into sniff placement. [facility] was [facility]. Family looking into sitters once she is discharge for snf. Pt w dementia and will need 24 supervision. Concerning bp, lisinopril titrated to normotensive. Kidney fx nl with gfr >60     Echo cardiogram wit combned systolic/ diastolic dysfuntion with moderate mitral regurgitation. Caritd US wnl.      superficial thrombosis was identified in the left antecubital cephalic   vein.     Pt was seen and examined on the day of  discharge  Vitals:  VITAL SIGNS: 24 HRS MIN & MAX LAST   Temp  Min: 97.6 °F (36.4 °C)  Max: 98.6 °F (37 °C) 98.2 °F (36.8 °C)   BP  Min: 129/68  Max: 174/94 (!) 141/60   Pulse  Min: 73  Max: 93  74   Resp  Min: 18  Max: 18 18   SpO2  Min: 95 %  Max: 97 % 97 %       Physical Exam:  General: In no acute distress, afebrile  Chest:  cta  Heart:  Normal rate  Abdomen: Soft, nontender, BS +  MSK: Warm, no lower extremity edema, no clubbing or cyanosis  Neurologic:  Easily arousable, confused moving all extremities spontaneously    Procedures Performed: No admission procedures for hospital encounter.     Significant Diagnostic Studies: See Full reports for all details    Recent Labs   Lab 08/24/23  0713   WBC 4.46*   RBC 4.01*   HGB 12.0   HCT 35.4*   MCV 88.3   MCH 29.9   MCHC 33.9   RDW 13.9      MPV 11.8*       Recent Labs   Lab 08/24/23  0714 08/26/23  0539     --    K 3.6  --    CO2 29  --    BUN 15.8  --    CREATININE 0.86 0.85   CALCIUM 8.9  --    MG 1.70  --         Microbiology Results (last 7 days)       Procedure Component Value Units Date/Time    Wound Culture [[id number]] Collected: 08/27/23 1307    Order Status: Completed Specimen: Drainage from Arm, Left Updated: 08/29/23 0932     Wound Culture Normal Skin Karen, no further workup.             CV Ultrasound doppler venous arm left  There was no evidence of deep vein thrombosis in the left upper extremity.     A superficial thrombosis was identified in the left antecubital cephalic   vein.           Explained in detail to the patient about the discharge plan, medications, and follow-up visits. Pt understands and agrees with the treatment plan  Discharge Disposition:  snf/[facility]   Discharged Condition: stable  Diet- renal/cardiac  Dietary Orders (From admission, onward)       Start     Ordered    08/22/23 1116  Dietary nutrition supplements Boost Strawberry; All Meals  Continuous        Question Answer Comment   Select PO Supplement:  Boost Strawberry    Frequency: All Meals        08/22/23 1115    08/20/23 0339  Diet heart healthy  (Diet/Nutrition [facility])  Diet effective now         08/20/23 0341                   Medications Per DC med rec  Activities as tolerated    For further questions contact hospitalist office    Discharge time 33 minutes    For worsening symptoms, chest pain, shortness of breath, increased abdominal pain, high grade fever, stroke or stroke like symptoms, immediately go to the nearest Emergency Room or call 911 as soon as possible.      Abdelrahman Frank M.D, on 8/30/2023. at 9:16 AM.

## 2023-08-30 NOTE — PLAN OF CARE
08/30/23 0850   Final Note   Assessment Type Final Discharge Note   [facility]   Post-Acute Status   Post-Acute Authorization Placement   Post-Acute Placement Status Set-up Complete/Auth obtained   Coverage [employer]   Discharge Delays (!) Ambulance Transport/Facility Transport  (facility to provide transport)

## 2023-09-06 ENCOUNTER — HOSPITAL ENCOUNTER (EMERGENCY)
Facility: HOSPITAL | Age: 88
Discharge: HOME | End: 2023-09-07
Attending: STUDENT IN AN ORGANIZED HEALTH CARE EDUCATION/TRAINING PROGRAM
Payer: MEDICARE

## 2023-09-06 ENCOUNTER — HOSPITAL ENCOUNTER (EMERGENCY)
Facility: HOSPITAL | Age: 88
Discharge: HOME | End: 2023-09-06
Attending: EMERGENCY MEDICINE
Payer: MEDICARE

## 2023-09-06 VITALS
SYSTOLIC BLOOD PRESSURE: 158 MMHG | HEIGHT: 67 IN | DIASTOLIC BLOOD PRESSURE: 96 MMHG | BODY MASS INDEX: 24.8 KG/M2 | WEIGHT: 158 LBS | RESPIRATION RATE: 24 BRPM | TEMPERATURE: 98 F | OXYGEN SATURATION: 98 % | HEART RATE: 72 BPM

## 2023-09-06 VITALS
WEIGHT: 158 LBS | DIASTOLIC BLOOD PRESSURE: 57 MMHG | HEART RATE: 69 BPM | OXYGEN SATURATION: 98 % | BODY MASS INDEX: 24.75 KG/M2 | RESPIRATION RATE: 17 BRPM | SYSTOLIC BLOOD PRESSURE: 152 MMHG | TEMPERATURE: 97 F
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
## 2023-09-06 DIAGNOSIS — S00.03XA SCALP HEMATOMA, INITIAL ENCOUNTER: ICD-10-CM

## 2023-09-06 LAB
ALBUMIN SERPL-MCNC: 3.1 G/DL (ref 3.4–4.8)
ALBUMIN/GLOB SERPL: 1.1 RATIO (ref 1.1–2)
ALP SERPL-CCNC: 72 UNIT/L (ref 40–150)
ALT SERPL-CCNC: 16 UNIT/L (ref 0–55)
AST SERPL-CCNC: 21 UNIT/L (ref 5–34)
BASOPHILS # BLD AUTO: 0.02 X10(3)/MCL
BASOPHILS NFR BLD AUTO: 0.6 %
BILIRUB SERPL-MCNC: 0.5 MG/DL
BUN SERPL-MCNC: 21.5 MG/DL (ref 9.8–20.1)
CALCIUM SERPL-MCNC: 8.4 MG/DL (ref 8.4–10.2)
CHLORIDE SERPL-SCNC: 103 MMOL/L (ref 98–111)
CO2 SERPL-SCNC: 29 MMOL/L (ref 23–31)
CREAT SERPL-MCNC: 0.93 MG/DL (ref 0.55–1.02)
EOSINOPHIL # BLD AUTO: 0.08 X10(3)/MCL (ref 0–0.9)
EOSINOPHIL NFR BLD AUTO: 2.4 %
ERYTHROCYTE [DISTWIDTH] IN BLOOD BY AUTOMATED COUNT: 13.6 % (ref 11.5–17)
GFR SERPLBLD CREATININE-BSD FMLA CKD-EPI: 57 MLS/MIN/1.73/M2
GLOBULIN SER-MCNC: 2.9 GM/DL (ref 2.4–3.5)
GLUCOSE SERPL-MCNC: 89 MG/DL (ref 75–121)
HCT VFR BLD AUTO: 32.3 % (ref 37–47)
HGB BLD-MCNC: 10.7 G/DL (ref 12–16)
IMM GRANULOCYTES # BLD AUTO: 0.02 X10(3)/MCL (ref 0–0.04)
IMM GRANULOCYTES NFR BLD AUTO: 0.6 %
LYMPHOCYTES # BLD AUTO: 1.16 X10(3)/MCL (ref 0.6–4.6)
LYMPHOCYTES NFR BLD AUTO: 34.8 %
MCH RBC QN AUTO: 30.1 PG (ref 27–31)
MCHC RBC AUTO-ENTMCNC: 33.1 G/DL (ref 33–36)
MCV RBC AUTO: 90.7 FL (ref 80–94)
MONOCYTES # BLD AUTO: 0.18 X10(3)/MCL (ref 0.1–1.3)
MONOCYTES NFR BLD AUTO: 5.4 %
NEUTROPHILS # BLD AUTO: 1.87 X10(3)/MCL (ref 2.1–9.2)
NEUTROPHILS NFR BLD AUTO: 56.2 %
NRBC BLD AUTO-RTO: 0 %
PLATELET # BLD AUTO: 164 X10(3)/MCL (ref 130–400)
PMV BLD AUTO: 11 FL (ref 7.4–10.4)
POTASSIUM SERPL-SCNC: 3.9 MMOL/L (ref 3.5–5.1)
PROT SERPL-MCNC: 6 GM/DL (ref 5.8–7.6)
RBC # BLD AUTO: 3.56 X10(6)/MCL (ref 4.2–5.4)
SODIUM SERPL-SCNC: 141 MMOL/L (ref 132–146)
WBC # SPEC AUTO: 3.33 X10(3)/MCL (ref 4.5–11.5)

## 2023-09-06 PROCEDURE — 99285 EMERGENCY DEPT VISIT HI MDM: CPT | Mod: 25,27

## 2023-09-06 PROCEDURE — 85025 COMPLETE CBC W/AUTO DIFF WBC: CPT | Performed by: EMERGENCY MEDICINE

## 2023-09-06 PROCEDURE — 80053 COMPREHEN METABOLIC PANEL: CPT | Performed by: EMERGENCY MEDICINE

## 2023-09-06 PROCEDURE — 99284 EMERGENCY DEPT VISIT MOD MDM: CPT | Mod: 25

## 2023-09-06 NOTE — ED PROVIDER NOTES
Encounter Date: 9/6/2023    SCRIBE #1 NOTE: I, Carlin Fragoso, am scribing for, and in the presence of,  Palomo Carrillo MD. I have scribed the entire note.       History     Chief Complaint   Patient presents with    Fall     Patient to er by [facility] from [facility]. Fall after standing up from wheelchair, unknown loc. GCS @ baseline 14     89 year old female with a hx of dementia presents to the ED from [facility] via EMS following a fall. EMS reports that the pt fell while being transferred from a wheelchair. It is unknown if the pt lost consciousness. Pt denies any pain. Pt is GCS 14 at baseline. Pt denies any trauma to her head or neck. Pt's ROS is limited due to dementia.     The history is provided by the patient and the EMS personnel. No  was used.     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  No family history on file.  Social History     Tobacco Use    Smoking status: Never     Passive exposure: Never    Smokeless tobacco: Never   Substance Use Topics    Alcohol use: Never    Drug use: Never     Review of Systems   Constitutional:  Negative for chills, diaphoresis, fatigue and fever.   HENT:  Negative for congestion, ear pain, sinus pain and sore throat.    Eyes:  Negative for visual disturbance.   Respiratory:  Negative for cough, shortness of breath, wheezing and stridor.    Cardiovascular:  Negative for palpitations.   Gastrointestinal:  Negative for abdominal pain, constipation, diarrhea, nausea, rectal pain and vomiting.   Genitourinary:  Negative for dysuria and hematuria.   Musculoskeletal:  Negative for arthralgias, back pain and  myalgias.   Skin:  Negative for rash.   Neurological:  Negative for dizziness, syncope, weakness, numbness and headaches.   All other systems reviewed and are negative.      Physical Exam     Initial Vitals [09/06/23 0147]   BP Pulse Resp Temp SpO2   136/71 70 18 97.2 °F (36.2 °C) 99 %      MAP       --         Physical Exam    Nursing note and vitals reviewed.  Constitutional: No distress.   Follows some commands.    HENT:   Head: Normocephalic and atraumatic.   No signs of trauma to the head   Eyes: EOM are normal.   Neck: Trachea normal. Neck supple.   Normal range of motion.  Cardiovascular:  Normal rate and regular rhythm.           No murmur heard.  Pulmonary/Chest: Breath sounds normal. No respiratory distress.   Abdominal: Abdomen is soft. Bowel sounds are normal. She exhibits no distension. There is no abdominal tenderness. There is no rebound and no guarding.   Musculoskeletal:         General: Normal range of motion.      Cervical back: Normal range of motion and neck supple.      Lumbar back: Normal range of motion.      Comments: No cervical spine tenderness     Neurological: She is alert. She has normal strength.   Skin: Skin is warm and dry. No rash noted.         ED Course   Procedures  Labs Reviewed   COMPREHENSIVE METABOLIC PANEL - Abnormal; Notable for the following components:       Result Value    Blood Urea Nitrogen 21.5 (*)     Albumin Level 3.1 (*)     All other components within normal limits   CBC WITH DIFFERENTIAL - Abnormal; Notable for the following components:    WBC 3.33 (*)     RBC 3.56 (*)     Hgb 10.7 (*)     Hct 32.3 (*)     MPV 11.0 (*)     Neut # 1.87 (*)     All other components within normal limits   CBC W/ AUTO DIFFERENTIAL    Narrative:     The following orders were created for panel order CBC auto differential.  Procedure                               Abnormality         Status                     ---------                               -----------         ------                      CBC with Differential[[id number]]        Abnormal            Final result                 Please view results for these tests on the individual orders.          Imaging Results              CT Cervical Spine Without Contrast (Preliminary result)  Result time 09/06/23 03:05:46      Preliminary result by Moody Terrazas Jr., MD (09/06/23 03:05:46)                   Narrative:    START OF REPORT:  TECHNIQUE: CT OF THE CERVICAL SPINE WAS PERFORMED WITHOUT INTRAVENOUS CONTRAST WITH AXIAL AS WELL AS SAGITTAL AND CORONAL IMAGES.    COMPARISON: COMPARISON IS WITH STUDY DATED 2023-08-19 22:06:20.    DOSAGE INFORMATION: AUTOMATED EXPOSURE CONTROL WAS UTILIZED.    CLINICAL HISTORY: FALL HEAD INJURY.    Findings:  Lung apices: The visualized lung apices appear unremarkable.  Spine:  Spinal canal: The spinal canal appears unremarkable.  Mineralization: Within normal limits for age.  Rotation: No significant rotation is seen.  Scoliosis: No significant scoliosis is seen.  Vertebral Fusion: No vertebral fusion is identified.  Listhesis: There is subtle grade I degenerative anterolisthesis C4 on C5.  Lordosis: Moderate straightening of the cervical lordosis is seen. This may be positional or reflect an element of myospasm.  Intervertebral disc spaces: Multilevel loss of disc height is seen.  Osteophytes: Mild multilevel endplate osteophytes are seen.  Endplate Sclerosis: Mild multilevel endplate sclerosis is seen.  Uncovertebral degenerative changes: Mild multilevel uncovertebral joint arthrosis is seen.  Facet degenerative changes: Mild multilevel facet degenerative changes are seen.  Fractures: No acute cervical spine fracture dislocation or subluxation is seen.  Orthopedic Hardware: None.    Miscellaneous:  Mastoid air cells: The visualized mastoid air cells appear clear.  Soft Tissues: Unremarkable.      Impression:  1. No acute cervical spine fracture dislocation or subluxation is seen.  2. Degenerative changes  and other details as above.                                         CT Head Without Contrast (Preliminary result)  Result time 09/06/23 03:04:55      Preliminary result by Moody Terrazas Jr., MD (09/06/23 03:04:55)                   Narrative:    START OF REPORT:  TECHNIQUE: CT OF THE HEAD WAS PERFORMED WITHOUT INTRAVENOUS CONTRAST WITH AXIAL AS WELL AS CORONAL AND SAGITTAL IMAGES.    COMPARISON: COMPARISON IS WITH STUDY DATED 2023-08-19 22:06:20.    DOSAGE INFORMATION: AUTOMATED EXPOSURE CONTROL WAS UTILIZED.    CLINICAL HISTORY: FALL HEAD INJURY.    Findings:  Hemorrhage: No acute intracranial hemorrhage is seen.  CSF spaces: The ventricles, sulci and basal cisterns all appear moderately prominent consistent with global cerebral atrophy.  Brain parenchyma: Pronounced stable appearing microvascular change is seen in portions of the periventricular and deep white matter tracts.  Cerebellum: Unremarkable.  Sella and skull base: The sella appears to be within normal limits for age.  Intracranial calcifications: Incidental note is made of bilateral choroid plexus calcification. Incidental note is made of some pineal region calcification. Incidental note is made of some calcification of the falx.  Calvarium: No acute linear or depressed skull fracture is seen.    Maxillofacial Structures:  Paranasal sinuses: There is some stable appearing opacity in the left sphenoid sinus. The rest of the paranasal sinuses appear clear.  Orbits: The orbits appear unremarkable.  Zygomatic arches: The zygomatic arches are intact and unremarkable.  Temporal bones and mastoids: The temporal bones and mastoids appear unremarkable.  TMJ: The mandibular condyles appear normally placed with respect to the mandibular fossa.      Impression:  1. No acute intracranial process identified. Details and findings as noted above.                                         X-Ray Pelvis Routine AP (In process)                      X-Ray Chest AP  Portable (In process)                   X-Rays:   Independently Interpreted Readings:   Other Readings:  No acute changes seen on x-rays of the pelvis or  chest x-ray    Medications - No data to display  Medical Decision Making  As per HPI.  Differential diagnosis: Accidental fall, dementia, intracranial injury, C-spine injury, pelvic injury    Amount and/or Complexity of Data Reviewed  Labs: ordered.     Details: All labs are stable, mild anemia  Radiology: ordered.     Details: CT of the head and CT of the C-spine were read as no acute changes per Radiology.  No acute changes seen on chest x-ray or pelvic x-ray  Discussion of management or test interpretation with external provider(s): Patient status post accidental fall at nursing home.  Patient had no complaints upon arrival.  CTs and x-rays show no acute changes.  Will DC to nursing home.            Scribe Attestation:   Scribe #1: I performed the above scribed service and the documentation accurately describes the services I performed. I attest to the accuracy of the note.    Attending Attestation:           Physician Attestation for Scribe:  Physician Attestation Statement for Scribe #1: I, Palomo Carrillo MD, reviewed documentation, as scribed by Carlin Fragoso in my presence, and it is both accurate and complete.             ED Course as of 09/06/23 0505   Wed Sep 06, 2023   0503 Patient remains in no apparent distress.  Vital signs are stable. [KG]      ED Course User Index  [KG] Palomo Carrillo MD                    Clinical Impression:   Final diagnoses:  [W19.XXXA] Fall  [W19.XXXA] Fall, initial encounter (Primary)        ED Disposition Condition    Discharge Stable          ED Prescriptions    None       Follow-up Information    None          Palomo Carrillo MD  09/06/23 0508

## 2023-09-07 NOTE — ED PROVIDER NOTES
Encounter Date: 9/6/2023    SCRIBE #1 NOTE: I, Tristen Kohli, am scribing for, and in the presence of,  Moe Jamison MD. I have scribed the following portions of the note - Other sections scribed: HPI, ROS, PE.       History     Chief Complaint   Patient presents with    Fall     Pt fell at NH aroung 1830. Hit her Head on the floor. No LOC, Baseline GCS 14, No blood thinners. No active bleeding. Pt has no complaints at this time. Small area of localized swelling on the back of her head.      89 year old female with pmhx of Htn, and dementai presents to ED via EMS following mechanical fall at 1830. Pt denies any complaints. Per son pt is not on blood thinners.    The history is provided by the patient. The history is limited by the condition of the patient. No  was used.     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  No family history on file.  Social History     Tobacco Use    Smoking status: Never     Passive exposure: Never    Smokeless tobacco: Never   Substance Use Topics    Alcohol use: Never    Drug use: Never     Review of Systems   Unable to perform ROS: Dementia   Gastrointestinal:  Negative for abdominal pain.   Musculoskeletal:  Negative for neck pain.       Physical Exam     Initial Vitals [09/06/23 1939]   BP Pulse Resp Temp SpO2   (!) 145/72 85 18 98.1 °F (36.7 °C) 98 %      MAP       --         Physical Exam    Nursing note and vitals reviewed.  Constitutional: She appears well-developed and well-nourished. She is not diaphoretic. No distress.   HENT:   Head: Normocephalic. Head is with contusion (hematom to occipital/parietal area).   Right  Ear: External ear normal.   Left Ear: External ear normal.   Nose: Nose normal.   Eyes: Conjunctivae and EOM are normal. Pupils are equal, round, and reactive to light. Right eye exhibits no discharge. Left eye exhibits no discharge.   Cardiovascular:  Normal rate, regular rhythm, normal heart sounds and intact distal pulses.     Exam reveals no gallop and no friction rub.       No murmur heard.  Pulmonary/Chest: Breath sounds normal. No respiratory distress. She has no wheezes. She has no rhonchi. She has no rales. She exhibits no tenderness.   Abdominal: Abdomen is soft. Bowel sounds are normal. She exhibits no distension and no mass. There is no abdominal tenderness. There is no rebound and no guarding.   Musculoskeletal:         General: No edema. Normal range of motion.     Neurological: She is alert. No cranial nerve deficit or sensory deficit.   Skin: Skin is warm and dry. Capillary refill takes less than 2 seconds. No erythema. No pallor.         ED Course   Procedures  Labs Reviewed - No data to display       Imaging Results              CT Cervical Spine Without Contrast (Final result)  Result time 09/06/23 20:30:14      Final result by Tyshawn Ta MD (09/06/23 20:30:14)                   Impression:      Spondylotic changes of cervical spine without evidence for fracture.      Electronically signed by: Tyshawn Ta MD  Date:    09/06/2023  Time:    20:30               Narrative:    EXAMINATION:  CT CERVICAL SPINE WITHOUT CONTRAST    CLINICAL HISTORY:  Neck trauma (Age >= 65y);    TECHNIQUE:  Low dose axial images, sagittal and coronal reformations were performed though the cervical spine.  Contrast was not administered.  An automated dose exposure technique was utilized this limits radiation does the patient.    COMPARISON:  09/06/2023    FINDINGS:  Straightening the normal lordotic curvature.  Grade 1 anterolisthesis of C4 on C5.  Diffuse osteopenia with diffuse intervertebral disc space narrowing.  No  evidence for compression deformity, fracture, or subluxation.  Predental space and prevertebral soft tissues are grossly normal.  No evidence for central canal stenosis.  Multilevel neural foraminal narrowing is identified.    Soft tissues of neck are normal.  The lung apices are clear.                                       CT Head Without Contrast (Final result)  Result time 09/06/23 20:28:25      Final result by Tyshawn Ta MD (09/06/23 20:28:25)                   Impression:      No acute abnormality.  Changes of microangiopathy with age-appropriate cerebral and cerebellar volume loss.  Large soft tissue hematoma in the high right parietooccipital region.      Electronically signed by: Tyshawn Ta MD  Date:    09/06/2023  Time:    20:28               Narrative:    EXAMINATION:  CT HEAD WITHOUT CONTRAST    CLINICAL HISTORY:  Transient ischemic attack (TIA);Head trauma, minor (Age >= 65y);    TECHNIQUE:  Low dose axial CT images obtained throughout the head without intravenous contrast. Sagittal and coronal reconstructions were performed.  An automated dose exposure technique was utilized this limits radiation does the patient.    COMPARISON:  None.    FINDINGS:  Intracranial compartment:    Ventricles and sulci are normal in size for age without evidence of hydrocephalus. No extra-axial blood or fluid collections.    The brain parenchyma appears normal. No parenchymal mass, hemorrhage, edema or major vascular distribution infarct.    Skull/extracranial contents (limited evaluation): No fracture. 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  Large scalp hematoma is identified in the high right parietooccipital region with likely laceration.  No evidence for radiopaque foreign body.                                       Medications - No data to display  Medical Decision Making  Patient presents with mechanical fall    Differential diagnosis includes but is not limited to  head bleed, fracture,  laceration, abrasion, contusion.      Patient is awake and alert.  Denies any complaints.  CT head and neck are unremarkable.  Patient with a hematoma with mild tenderness palpation to the right scalp.  Evidently fell recently no was seen in this emergency department for similar.  Discussed findings with both patient family room.  They are comfortable with discharge home.  No other concerns voiced by son at this time. Return precautions given.  Questions invited, questions answered to the best my ability.  Patient discharged home condition stable.      Amount and/or Complexity of Data Reviewed  Independent Historian: caregiver     Details: Per son pt is not on blood thinners  External Data Reviewed: notes.     Details: See ED course  Radiology: ordered and independent interpretation performed.     Details: CT head and neck unremarkable.    Risk  OTC drugs.            Scribe Attestation:   Scribe #1: I performed the above scribed service and the documentation accurately describes the services I performed. I attest to the accuracy of the note.    Attending Attestation:           Physician Attestation for Scribe:  Physician Attestation Statement for Scribe #1: I, Moe Jamison MD, reviewed documentation, as scribed by Tristen Kohli in my presence, and it is both accurate and complete.             ED Course as of 09/07/23 0535   Wed Sep 06, 2023   2227 Patient was seen earlier today 9/6 for similar.  Fall from wheelchair at that time.  When being transferred from her wheelchair. [MM]      ED Course User Index  [MM] Moe Jamison MD                    Clinical Impression:   Final diagnoses:  [W19.XXXA] Fall, initial encounter (Primary)  [S00.03XA] Scalp hematoma, initial encounter        ED Disposition Condition    Discharge Stable          ED Prescriptions    None       Follow-up Information       Follow up With Specialties Details Why Contact Info    Samra Alfredo MD Family Medicine   [address]  [facility] [address]  [phone]               Moe Jamison MD  09/07/23 0535

## 2023-09-07 NOTE — FIRST PROVIDER EVALUATION
"Medical screening examination initiated.  I have conducted a focused provider triage encounter, findings are as follows:    Brief history of present illness:  89 y/o female presents from nursing home with fall around 1830 +head injury. No known loc. No thinners. Contusion to head.     Vitals:    09/06/23 1939   BP: (!) 145/72   Pulse: 85   Resp: 18   Temp: 98.1 °F (36.7 °C)   TempSrc: Oral   SpO2: 98%   Weight: 71.7 kg (158 lb)   Height: 5' 7" (1.702 m)       Pertinent physical exam:  alert, head wrapped, nonlabored     Brief workup plan:  imaging    Preliminary workup initiated; this workup will be continued and followed by the physician or advanced practice provider that is assigned to the patient when roomed.  "

## 2023-09-12 ENCOUNTER — LAB REQUISITION (OUTPATIENT)
Dept: LAB | Facility: HOSPITAL | Age: 88
End: 2023-09-12
Payer: MEDICARE

## 2023-09-12 DIAGNOSIS — I50.9 HEART FAILURE, UNSPECIFIED: ICD-10-CM

## 2023-09-12 LAB
ANION GAP SERPL CALC-SCNC: 11 MEQ/L
BUN SERPL-MCNC: 20.5 MG/DL (ref 9.8–20.1)
CALCIUM SERPL-MCNC: 8 MG/DL (ref 8.4–10.2)
CHLORIDE SERPL-SCNC: 104 MMOL/L (ref 98–111)
CO2 SERPL-SCNC: 31 MMOL/L (ref 23–31)
CREAT SERPL-MCNC: 0.99 MG/DL (ref 0.55–1.02)
CREAT/UREA NIT SERPL: 21
FERRITIN SERPL-MCNC: 576.57 NG/ML (ref 4.63–204)
FOLATE SERPL-MCNC: 15.5 NG/ML (ref 7–31.4)
GFR SERPLBLD CREATININE-BSD FMLA CKD-EPI: 53 MLS/MIN/1.73/M2
GLUCOSE SERPL-MCNC: 86 MG/DL (ref 75–121)
IRON SERPL-MCNC: 56 UG/DL (ref 50–170)
POTASSIUM SERPL-SCNC: 3.4 MMOL/L (ref 3.5–5.1)
SODIUM SERPL-SCNC: 146 MMOL/L (ref 132–146)

## 2023-09-12 PROCEDURE — 83540 ASSAY OF IRON: CPT | Performed by: FAMILY MEDICINE

## 2023-09-12 PROCEDURE — 82746 ASSAY OF FOLIC ACID SERUM: CPT | Performed by: FAMILY MEDICINE

## 2023-09-12 PROCEDURE — 82728 ASSAY OF FERRITIN: CPT | Performed by: FAMILY MEDICINE

## 2023-09-12 PROCEDURE — 80048 BASIC METABOLIC PNL TOTAL CA: CPT | Performed by: FAMILY MEDICINE

## 2023-09-28 ENCOUNTER — TELEPHONE (OUTPATIENT)
Dept: FAMILY MEDICINE | Facility: CLINIC | Age: 88
End: 2023-09-28
Payer: MEDICARE

## 2023-09-28 DIAGNOSIS — F03.C18 SEVERE DEMENTIA WITH OTHER BEHAVIORAL DISTURBANCE, UNSPECIFIED DEMENTIA TYPE: Primary | ICD-10-CM

## 2023-09-28 DIAGNOSIS — R29.6 FREQUENT FALLS: ICD-10-CM

## 2023-09-28 NOTE — TELEPHONE ENCOUNTER
[facility] reported the patient is being discharged from rehab on tomorrow and they are recommending home health services for the patient. Do you agree and will you send referral so that patient can be admitted Saturday.    Sheba Mack RN    [facility]

## 2023-09-28 NOTE — PROGRESS NOTES
Patient discharged from SNF after hospitalization and rehab stay, requiring transition back to home health care for additional monitoring and services.   Will order to reinstate services with prior home health [facility].     Samra Alfredo MD  Attending - Family Medicine / Geriatric Medicine  [facility]

## 2023-09-29 ENCOUNTER — HOSPITAL ENCOUNTER (INPATIENT)
Facility: HOSPITAL | Age: 88
LOS: 27 days | Discharge: HOSPICE | DRG: 884 | End: 2023-10-27
Attending: EMERGENCY MEDICINE | Admitting: INTERNAL MEDICINE
Payer: MEDICARE

## 2023-09-29 DIAGNOSIS — L08.9 INFECTED HEMATOMA: ICD-10-CM

## 2023-09-29 DIAGNOSIS — R29.6 FREQUENT FALLS: Primary | ICD-10-CM

## 2023-09-29 DIAGNOSIS — R07.9 CHEST PAIN: ICD-10-CM

## 2023-09-29 DIAGNOSIS — T14.8XXA INFECTED HEMATOMA: ICD-10-CM

## 2023-09-29 DIAGNOSIS — F03.918 DEMENTIA WITH BEHAVIORAL DISTURBANCE: ICD-10-CM

## 2023-09-29 LAB
ALBUMIN SERPL-MCNC: 2.8 G/DL (ref 3.4–4.8)
ALBUMIN/GLOB SERPL: 0.8 RATIO (ref 1.1–2)
ALP SERPL-CCNC: 90 UNIT/L (ref 40–150)
ALT SERPL-CCNC: 9 UNIT/L (ref 0–55)
AST SERPL-CCNC: 13 UNIT/L (ref 5–34)
BASOPHILS # BLD AUTO: 0.01 X10(3)/MCL
BASOPHILS NFR BLD AUTO: 0.2 %
BILIRUB SERPL-MCNC: 0.3 MG/DL
BUN SERPL-MCNC: 22.8 MG/DL (ref 9.8–20.1)
CALCIUM SERPL-MCNC: 8.6 MG/DL (ref 8.4–10.2)
CHLORIDE SERPL-SCNC: 103 MMOL/L (ref 98–111)
CO2 SERPL-SCNC: 28 MMOL/L (ref 23–31)
CREAT SERPL-MCNC: 1.09 MG/DL (ref 0.55–1.02)
EOSINOPHIL # BLD AUTO: 0.09 X10(3)/MCL (ref 0–0.9)
EOSINOPHIL NFR BLD AUTO: 2.2 %
ERYTHROCYTE [DISTWIDTH] IN BLOOD BY AUTOMATED COUNT: 14.5 % (ref 11.5–17)
GFR SERPLBLD CREATININE-BSD FMLA CKD-EPI: 47 MLS/MIN/1.73/M2
GLOBULIN SER-MCNC: 3.5 GM/DL (ref 2.4–3.5)
GLUCOSE SERPL-MCNC: 102 MG/DL (ref 75–121)
HCT VFR BLD AUTO: 28.2 % (ref 37–47)
HGB BLD-MCNC: 9.2 G/DL (ref 12–16)
IMM GRANULOCYTES # BLD AUTO: 0.12 X10(3)/MCL (ref 0–0.04)
IMM GRANULOCYTES NFR BLD AUTO: 2.9 %
INR PPP: 1.1
LYMPHOCYTES # BLD AUTO: 1.54 X10(3)/MCL (ref 0.6–4.6)
LYMPHOCYTES NFR BLD AUTO: 37.6 %
MCH RBC QN AUTO: 30.5 PG (ref 27–31)
MCHC RBC AUTO-ENTMCNC: 32.6 G/DL (ref 33–36)
MCV RBC AUTO: 93.4 FL (ref 80–94)
MONOCYTES # BLD AUTO: 0.28 X10(3)/MCL (ref 0.1–1.3)
MONOCYTES NFR BLD AUTO: 6.8 %
NEUTROPHILS # BLD AUTO: 2.06 X10(3)/MCL (ref 2.1–9.2)
NEUTROPHILS NFR BLD AUTO: 50.3 %
NRBC BLD AUTO-RTO: 0 %
PLATELET # BLD AUTO: 149 X10(3)/MCL (ref 130–400)
PMV BLD AUTO: 11.3 FL (ref 7.4–10.4)
POTASSIUM SERPL-SCNC: 3.7 MMOL/L (ref 3.5–5.1)
PROT SERPL-MCNC: 6.3 GM/DL (ref 5.8–7.6)
PROTHROMBIN TIME: 13.9 SECONDS (ref 12.5–14.5)
RBC # BLD AUTO: 3.02 X10(6)/MCL (ref 4.2–5.4)
SODIUM SERPL-SCNC: 141 MMOL/L (ref 132–146)
WBC # SPEC AUTO: 4.1 X10(3)/MCL (ref 4.5–11.5)

## 2023-09-29 PROCEDURE — S0166 INJ OLANZAPINE 2.5MG: HCPCS | Performed by: EMERGENCY MEDICINE

## 2023-09-29 PROCEDURE — 99285 EMERGENCY DEPT VISIT HI MDM: CPT | Mod: 25

## 2023-09-29 PROCEDURE — 80053 COMPREHEN METABOLIC PANEL: CPT

## 2023-09-29 PROCEDURE — 85610 PROTHROMBIN TIME: CPT

## 2023-09-29 PROCEDURE — 87070 CULTURE OTHR SPECIMN AEROBIC: CPT | Performed by: EMERGENCY MEDICINE

## 2023-09-29 PROCEDURE — 11000001 HC ACUTE MED/SURG PRIVATE ROOM

## 2023-09-29 PROCEDURE — 85025 COMPLETE CBC W/AUTO DIFF WBC: CPT

## 2023-09-29 PROCEDURE — 25000003 PHARM REV CODE 250: Performed by: EMERGENCY MEDICINE

## 2023-09-29 RX ORDER — OLANZAPINE 10 MG/2ML
5 INJECTION, POWDER, FOR SOLUTION INTRAMUSCULAR ONCE AS NEEDED
Status: COMPLETED | OUTPATIENT
Start: 2023-09-29 | End: 2023-09-29

## 2023-09-29 RX ORDER — DOXYCYCLINE HYCLATE 100 MG
100 TABLET ORAL
Status: COMPLETED | OUTPATIENT
Start: 2023-09-29 | End: 2023-09-29

## 2023-09-29 RX ORDER — DOXYCYCLINE 100 MG/1
100 CAPSULE ORAL
Status: DISCONTINUED | OUTPATIENT
Start: 2023-09-29 | End: 2023-09-29

## 2023-09-29 RX ADMIN — DOXYCYCLINE HYCLATE 100 MG: 100 TABLET, COATED ORAL at 11:09

## 2023-09-29 RX ADMIN — OLANZAPINE 5 MG: 10 INJECTION, POWDER, FOR SOLUTION INTRAMUSCULAR at 11:09

## 2023-09-29 NOTE — PROGRESS NOTES
The referral for home health services with [facility] was faxed with receipt of a successful confirmation received.    Sheba Mack RN    [facility]

## 2023-09-30 PROBLEM — R29.6 FREQUENT FALLS: Status: ACTIVE | Noted: 2023-09-30

## 2023-09-30 LAB
ABS NEUT (OLG): 2.6 X10(3)/MCL (ref 2.1–9.2)
ALBUMIN SERPL-MCNC: 2.7 G/DL (ref 3.4–4.8)
ALBUMIN/GLOB SERPL: 0.8 RATIO (ref 1.1–2)
ALP SERPL-CCNC: 84 UNIT/L (ref 40–150)
ALT SERPL-CCNC: 9 UNIT/L (ref 0–55)
ANISOCYTOSIS BLD QL SMEAR: ABNORMAL
AST SERPL-CCNC: 12 UNIT/L (ref 5–34)
BILIRUB SERPL-MCNC: 0.4 MG/DL
BUN SERPL-MCNC: 19.4 MG/DL (ref 9.8–20.1)
CALCIUM SERPL-MCNC: 8.5 MG/DL (ref 8.4–10.2)
CHLORIDE SERPL-SCNC: 107 MMOL/L (ref 98–111)
CO2 SERPL-SCNC: 27 MMOL/L (ref 23–31)
CREAT SERPL-MCNC: 0.94 MG/DL (ref 0.55–1.02)
EOSINOPHIL NFR BLD MANUAL: 0.04 X10(3)/MCL (ref 0–0.9)
EOSINOPHIL NFR BLD MANUAL: 1 %
ERYTHROCYTE [DISTWIDTH] IN BLOOD BY AUTOMATED COUNT: 14.4 % (ref 11.5–17)
GFR SERPLBLD CREATININE-BSD FMLA CKD-EPI: 57 MLS/MIN/1.73/M2
GLOBULIN SER-MCNC: 3.3 GM/DL (ref 2.4–3.5)
GLUCOSE SERPL-MCNC: 82 MG/DL (ref 75–121)
HCT VFR BLD AUTO: 30 % (ref 37–47)
HGB BLD-MCNC: 9.9 G/DL (ref 12–16)
INSTRUMENT WBC (OLG): 3.88 X10(3)/MCL
LYMPHOCYTES NFR BLD MANUAL: 1.09 X10(3)/MCL
LYMPHOCYTES NFR BLD MANUAL: 28 %
MACROCYTES BLD QL SMEAR: ABNORMAL
MAGNESIUM SERPL-MCNC: 2.1 MG/DL (ref 1.6–2.6)
MCH RBC QN AUTO: 30.8 PG (ref 27–31)
MCHC RBC AUTO-ENTMCNC: 33 G/DL (ref 33–36)
MCV RBC AUTO: 93.5 FL (ref 80–94)
MONOCYTES NFR BLD MANUAL: 0.12 X10(3)/MCL (ref 0.1–1.3)
MONOCYTES NFR BLD MANUAL: 3 %
NEUTROPHILS NFR BLD MANUAL: 67 %
NRBC BLD AUTO-RTO: 0 %
PHOSPHATE SERPL-MCNC: 2 MG/DL (ref 2.3–4.7)
PLASMA CELLS BLD QL SMEAR: 1 %
PLATELET # BLD AUTO: 153 X10(3)/MCL (ref 130–400)
PLATELET # BLD EST: ABNORMAL 10*3/UL
PMV BLD AUTO: 11.4 FL (ref 7.4–10.4)
POTASSIUM SERPL-SCNC: 3.3 MMOL/L (ref 3.5–5.1)
PROT SERPL-MCNC: 6 GM/DL (ref 5.8–7.6)
RBC # BLD AUTO: 3.21 X10(6)/MCL (ref 4.2–5.4)
RBC MORPH BLD: ABNORMAL
SODIUM SERPL-SCNC: 145 MMOL/L (ref 132–146)
WBC # SPEC AUTO: 3.88 X10(3)/MCL (ref 4.5–11.5)

## 2023-09-30 PROCEDURE — 85027 COMPLETE CBC AUTOMATED: CPT | Performed by: NURSE PRACTITIONER

## 2023-09-30 PROCEDURE — 11000001 HC ACUTE MED/SURG PRIVATE ROOM

## 2023-09-30 PROCEDURE — 63600175 PHARM REV CODE 636 W HCPCS: Performed by: NURSE PRACTITIONER

## 2023-09-30 PROCEDURE — 25000003 PHARM REV CODE 250: Performed by: NURSE PRACTITIONER

## 2023-09-30 PROCEDURE — 84100 ASSAY OF PHOSPHORUS: CPT | Performed by: NURSE PRACTITIONER

## 2023-09-30 PROCEDURE — 80053 COMPREHEN METABOLIC PANEL: CPT | Performed by: NURSE PRACTITIONER

## 2023-09-30 PROCEDURE — 83735 ASSAY OF MAGNESIUM: CPT | Performed by: NURSE PRACTITIONER

## 2023-09-30 RX ORDER — NALOXONE HCL 0.4 MG/ML
0.02 VIAL (ML) INJECTION
Status: DISCONTINUED | OUTPATIENT
Start: 2023-09-30 | End: 2023-10-27 | Stop reason: HOSPADM

## 2023-09-30 RX ORDER — ACETAMINOPHEN 325 MG/1
650 TABLET ORAL EVERY 6 HOURS PRN
Status: DISCONTINUED | OUTPATIENT
Start: 2023-09-30 | End: 2023-10-27 | Stop reason: HOSPADM

## 2023-09-30 RX ORDER — HYDRALAZINE HYDROCHLORIDE 20 MG/ML
10 INJECTION INTRAMUSCULAR; INTRAVENOUS EVERY 6 HOURS PRN
Status: DISCONTINUED | OUTPATIENT
Start: 2023-09-30 | End: 2023-10-27 | Stop reason: HOSPADM

## 2023-09-30 RX ORDER — PROCHLORPERAZINE EDISYLATE 5 MG/ML
5 INJECTION INTRAMUSCULAR; INTRAVENOUS EVERY 6 HOURS PRN
Status: DISCONTINUED | OUTPATIENT
Start: 2023-09-30 | End: 2023-10-27 | Stop reason: HOSPADM

## 2023-09-30 RX ORDER — CARVEDILOL 3.12 MG/1
3.12 TABLET ORAL 2 TIMES DAILY WITH MEALS
Status: DISCONTINUED | OUTPATIENT
Start: 2023-09-30 | End: 2023-10-27 | Stop reason: HOSPADM

## 2023-09-30 RX ORDER — SODIUM CHLORIDE, SODIUM LACTATE, POTASSIUM CHLORIDE, CALCIUM CHLORIDE 600; 310; 30; 20 MG/100ML; MG/100ML; MG/100ML; MG/100ML
INJECTION, SOLUTION INTRAVENOUS CONTINUOUS
Status: ACTIVE | OUTPATIENT
Start: 2023-09-30 | End: 2023-10-01

## 2023-09-30 RX ORDER — POLYETHYLENE GLYCOL 3350 17 G/17G
17 POWDER, FOR SOLUTION ORAL 2 TIMES DAILY PRN
Status: DISCONTINUED | OUTPATIENT
Start: 2023-09-30 | End: 2023-10-27 | Stop reason: HOSPADM

## 2023-09-30 RX ORDER — SIMETHICONE 80 MG
1 TABLET,CHEWABLE ORAL 4 TIMES DAILY PRN
Status: DISCONTINUED | OUTPATIENT
Start: 2023-09-30 | End: 2023-10-27 | Stop reason: HOSPADM

## 2023-09-30 RX ORDER — QUETIAPINE FUMARATE 25 MG/1
25 TABLET, FILM COATED ORAL NIGHTLY
Status: DISCONTINUED | OUTPATIENT
Start: 2023-09-30 | End: 2023-10-07

## 2023-09-30 RX ORDER — TALC
6 POWDER (GRAM) TOPICAL NIGHTLY PRN
Status: DISCONTINUED | OUTPATIENT
Start: 2023-09-30 | End: 2023-10-27 | Stop reason: HOSPADM

## 2023-09-30 RX ORDER — ENOXAPARIN SODIUM 100 MG/ML
40 INJECTION SUBCUTANEOUS EVERY 24 HOURS
Status: DISCONTINUED | OUTPATIENT
Start: 2023-09-30 | End: 2023-10-27 | Stop reason: HOSPADM

## 2023-09-30 RX ORDER — ONDANSETRON 2 MG/ML
4 INJECTION INTRAMUSCULAR; INTRAVENOUS EVERY 4 HOURS PRN
Status: DISCONTINUED | OUTPATIENT
Start: 2023-09-30 | End: 2023-10-27 | Stop reason: HOSPADM

## 2023-09-30 RX ORDER — MAG HYDROX/ALUMINUM HYD/SIMETH 200-200-20
30 SUSPENSION, ORAL (FINAL DOSE FORM) ORAL 4 TIMES DAILY PRN
Status: DISCONTINUED | OUTPATIENT
Start: 2023-09-30 | End: 2023-10-27 | Stop reason: HOSPADM

## 2023-09-30 RX ORDER — LISINOPRIL 20 MG/1
20 TABLET ORAL DAILY
Status: DISCONTINUED | OUTPATIENT
Start: 2023-10-01 | End: 2023-10-06

## 2023-09-30 RX ADMIN — ENOXAPARIN SODIUM 40 MG: 40 INJECTION SUBCUTANEOUS at 04:09

## 2023-09-30 RX ADMIN — SODIUM CHLORIDE, POTASSIUM CHLORIDE, SODIUM LACTATE AND CALCIUM CHLORIDE: 600; 310; 30; 20 INJECTION, SOLUTION INTRAVENOUS at 10:09

## 2023-09-30 RX ADMIN — CARVEDILOL 3.12 MG: 3.12 TABLET, FILM COATED ORAL at 04:09

## 2023-09-30 RX ADMIN — Medication 6 MG: at 08:09

## 2023-09-30 RX ADMIN — QUETIAPINE FUMARATE 25 MG: 25 TABLET ORAL at 08:09

## 2023-09-30 RX ADMIN — ACETAMINOPHEN 650 MG: 325 TABLET, FILM COATED ORAL at 04:09

## 2023-09-30 NOTE — ED PROVIDER NOTES
Encounter Date: 9/29/2023    SCRIBE #1 NOTE: I, Nicole Alberta, am scribing for, and in the presence of,  Dionne Davis MD. I have scribed the following portions of the note - Other sections scribed: HPI, ROS, PE.       History     Chief Complaint   Patient presents with    Fall     Pt fell at home after climbing out of bed. Pt normal GCS 14. Pt appears to not be in distress. Large knot to he occipital area. No blood thinners, no LOC.      Patient is a 89 year old female with a hx of dementia and frequent falls that presents to the ED following a fall PTA. Patient's son reports she was discharged this afternoon after being in Rehab for 30 days; notes she was in rehab for frequent falls. He stepped out of her bedroom to charge his phone and she climbed over her bed railing, hitting the back of her head of the ground. He reports she does not use the walker she has but occasionally uses her wheelchair. He stresses concerns about her living at home alone. Patient denies fever.     The history is provided by the patient, medical records and a relative. No  was used.     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  No family history on file.  Social History     Tobacco Use    Smoking status: Never     Passive exposure: Never    Smokeless tobacco: Never   Substance Use Topics    Alcohol use: Never    Drug use: Never     Review of Systems   Unable to perform ROS: Dementia       Physical Exam     Initial Vitals [09/29/23 2045]   BP Pulse Resp Temp SpO2   130/75 76 16 99 °F (37.2 °C) (!) 94 %      MAP       --         Physical Exam    Nursing note and vitals  reviewed.  Constitutional: She appears well-developed and well-nourished. She is not diaphoretic. No distress.   HENT:   Head: Normocephalic.   Nose: Nose normal.   Mouth/Throat: Oropharynx is clear and moist.   Hematoma to the back of head with light red drainage with no obvious laceration.    Eyes: Conjunctivae and EOM are normal. Pupils are equal, round, and reactive to light.   Neck: Trachea normal. Neck supple.   Normal range of motion.  Cardiovascular:  Normal rate, regular rhythm, normal heart sounds and intact distal pulses.           No murmur heard.  Pulmonary/Chest: Breath sounds normal. No respiratory distress. She has no wheezes. She has no rhonchi. She has no rales. She exhibits no tenderness.   Abdominal: Abdomen is soft. Bowel sounds are normal. She exhibits no distension and no mass. There is no abdominal tenderness. There is no rebound and no guarding.   Musculoskeletal:         General: No tenderness or edema.      Cervical back: Normal range of motion and neck supple.      Lumbar back: Normal. Normal range of motion.     Neurological: She is alert. She has normal strength. No cranial nerve deficit or sensory deficit.   Altered.    Skin: Skin is warm and dry. Capillary refill takes less than 2 seconds. No abscess noted. No erythema. No pallor.         ED Course   I & D - Incision and Drainage    Date/Time: 9/29/2023 10:11 PM  Location procedure was performed: [facility] EMERGENCY DEPARTMENT    Performed by: Dionne Davis MD  Authorized by: Dionne Davis MD  Consent Done: Yes  Consent: Verbal consent obtained.  Consent given by: guardian  Patient understanding: patient states understanding of the procedure being performed  Type: abscess  Body area: head/neck  Location details: scalp  Anesthesia: local infiltration    Anesthesia:  Local Anesthetic: lidocaine 1% without epinephrine  Anesthetic total: 3 mL    Patient sedated: no  Scalpel size: 11  Incision type: single straight  Complexity:  complex  Drainage: pus, bloody and purulent  Drainage amount: copious  Wound treatment: incision, drainage, expression of material, wound left open and deloculation  Patient tolerance: Patient tolerated the procedure well with no immediate complications  Comments: Pt yelled to stop, denied pain        Labs Reviewed   COMPREHENSIVE METABOLIC PANEL - Abnormal; Notable for the following components:       Result Value    Blood Urea Nitrogen 22.8 (*)     Creatinine 1.09 (*)     Albumin Level 2.8 (*)     Albumin/Globulin Ratio 0.8 (*)     All other components within normal limits   CBC WITH DIFFERENTIAL - Abnormal; Notable for the following components:    WBC 4.10 (*)     RBC 3.02 (*)     Hgb 9.2 (*)     Hct 28.2 (*)     MCHC 32.6 (*)     MPV 11.3 (*)     Neut # 2.06 (*)     IG# 0.12 (*)     All other components within normal limits   PROTIME-INR - Normal   WOUND CULTURE (OLG)   CBC W/ AUTO DIFFERENTIAL    Narrative:     The following orders were created for panel order CBC Auto Differential.  Procedure                               Abnormality         Status                     ---------                               -----------         ------                     CBC with Differential[[id number]]       Abnormal            Final result                 Please view results for these tests on the individual orders.          Imaging Results              CT Cervical Spine Without Contrast (Final result)  Result time 09/29/23 21:12:45      Final result by Errol Whitten MD (09/29/23 21:12:45)                   Narrative:    EXAMINATION  CT CERVICAL SPINE WITHOUT CONTRAST    CLINICAL HISTORY  Neck trauma (Age >= 65y);    TECHNIQUE  Non-contrast helical-acquisition CT images of the cervical spine were obtained and multiplanar reformats accomplished by a CT technologist at a separate workstation, pushed to PACS for physician review.    COMPARISON  6 September 2023    FINDINGS  Images were reviewed in bone, soft tissue, and lung  windows.    Exam quality: adequate for evaluation    Visualized levels: Skull base through T2.    No newly identified acute cortical displacement or other findings to suggest traumatic cervical spine injury.  Degenerative changes throughout the visualized spinal column are similar.    No prevertebral soft tissue swelling.  Paraspinal musculature and more superficial soft tissues are without acute finding.  Low-density nodule of the right hemithyroid unchanged.    IMPRESSION  1. No convincing acute cervical spine abnormality.  2. Chronic findings without gross change from the exam a few weeks prior.  ==========    Please note ligament, spinal cord, and/or vascular abnormalities cannot be excluded on the basis of this examination.  Additional imaging recommended if there is elevated clinical concern for high-grade soft tissue injury.    RADIATION DOSE  Automated tube current modulation, weight-based exposure dosing, and/or iterative reconstruction technique utilized to reach lowest reasonably achievable exposure rate.    DLP: 1427 mGy*cm      Electronically signed by: Errol Whitten  Date:    09/29/2023  Time:    21:12                                     CT Head Without Contrast (Final result)  Result time 09/29/23 21:10:40      Final result by Errol Whitten MD (09/29/23 21:10:40)                   Narrative:    EXAMINATION  CT HEAD WITHOUT CONTRAST    CLINICAL HISTORY  Head trauma, moderate-severe;    TECHNIQUE  Axial non-contrast CT images of the head were acquired and multiplanar reconstructions accomplished by a CT technologist at a separate workstation, pushed to PACS for physician review.    COMPARISON  6 September 2023    FINDINGS  Images were reviewed in subdural, brain, soft tissue, and bone windows.    Exam quality: Motion/streak artifact limits assessment of the posterior fossa.    Hemorrhage: No evidence of acute hyperattenuating blood products.    Parenchyma: There is diffuse bilateral supratentorial  white matter hypoattenuation, typical of chronic microvascular changes. No discrete mass, mass effect, or CT evidence of acute large vascular territory insult. Gray-white differentiation is preserved.    Midline shift: None.    CSF spaces: Proportional appearance of ventricular and sulcal enlargement. No hydrocephalus. No masses or fluid collections.    Vasculature: No focally hyperdense artery. Scattered carotid siphon calcifications are present. No abnormal densities within the dural sinuses.    Other findings: Findings suggestive of large right posterior scalp hematoma unchanged in comparison.  No new abnormality of the extracranial soft tissues identified.  There is no depressed skull fracture.  Mastoids are well aerated. No focal abnormality of the sella. The included facial structures are unremarkable.    IMPRESSION  1. No new or worsened intracranial abnormality.  2. Redemonstrated posterior right parietal scalp hematoma without depressed skull fracture.  3. Chronic findings above.    RADIATION DOSE  Automated tube current modulation, weight-based exposure dosing, and/or iterative reconstruction technique utilized to reach lowest reasonably achievable exposure rate.    DLP: 1427 mGy*cm      Electronically signed by: Errol Whitten  Date:    09/29/2023  Time:    21:10                                     Medications   OLANZapine injection 5 mg (5 mg Intramuscular Given 9/29/23 2300)   doxycycline tablet 100 mg (100 mg Oral Given 9/29/23 2300)     Medical Decision Making  Differential diagnosis includes, but is not limited to, frequent falls, infected hematoma, and intercranial hemorrhage.   Cbc, cmp, inr, ct head ordered and reviewed  Had persistent infected hematoma that ruptured, I&d performed, pt would not allow me to completely drain but a large portion was drained. Labs without significant abnoramlity, admit for placement    Problems Addressed:  Dementia with behavioral disturbance: chronic illness or  injury  Frequent falls: acute illness or injury that poses a threat to life or bodily functions  Infected hematoma: acute illness or injury that poses a threat to life or bodily functions    Amount and/or Complexity of Data Reviewed  Independent Historian:      Details: Patient's son reports she was discharged this afternoon after being in Rehab for 30 days; notes she was in rehab for frequent falls. He stepped out of her bedroom to charge his phone and she climbed over her bed railing, hitting the back of her head of the ground. He reports she does not use the walker she has but occasionally uses her wheelchair. He stresses concerns about her living at home alone.  External Data Reviewed: notes.  Labs: ordered.  Radiology: ordered.    Risk  OTC drugs.  Prescription drug management.  Decision regarding hospitalization.            Scribe Attestation:   Scribe #1: I performed the above scribed service and the documentation accurately describes the services I performed. I attest to the accuracy of the note.  Comments: Attending:   Physician Attestation Statement for Scribe #1: IDionne MD, personally performed the services described in this documentation. All medical record entries made by the scribe were at my direction and in my presence.  I have reviewed the chart and agree that the record reflects my personal performance and is accurate and complete.        Attending Attestation:           Physician Attestation for Scribe:  Physician Attestation Statement for Scribe #1: IDionne MD, reviewed documentation, as scribed by Nicole Pinzon in my presence, and it is both accurate and complete.                        Medical Decision Making:   History:   Old Medical Records: I decided to obtain old medical records.  Old Records Summarized: records from previous admission(s).       <> Summary of Records: For frequent falls  Initial Assessment:   See hpi  Clinical Tests:   Lab Tests: Reviewed and  Ordered  Radiological Study: Ordered and Reviewed  Other:   I have discussed this case with another health care provider.      Clinical Impression:   Final diagnoses:  [R29.6] Frequent falls (Primary)  [F03.918] Dementia with behavioral disturbance  [T14.8XXA, L08.9] Infected hematoma        ED Disposition Condition    Admit Stable                Dionne Davis MD  09/29/23 9345

## 2023-09-30 NOTE — ED NOTES
Abscess noted to occiput. MD at bedside for I/D. Wound culture obtained and pressure dressing applied. No active bleeding noted.

## 2023-09-30 NOTE — H&P
[facility] Hospital Medicine History & Physical Examination       Patient Name: Danielle Loera  MRN: [id number]  Patient Class: IP- Inpatient   Admission Date: 9/29/2023   Admitting Physician: [facility]   Length of Stay: 1  Attending Physician: facundo Eugene  Primary Care Provider: Samra Alfredo MD  Face-to-Face encounter date: 09/30/2023  Code Status: Full code  Chief Complaint: Fall (Pt fell at home after climbing out of bed. Pt normal GCS 14. Pt appears to not be in distress. Large knot to he occipital area. No blood thinners, no LOC. )        Patient information was obtained from patient, patient's family, past medical records and ER records.     HISTORY OF PRESENT ILLNESS:   Danielle Loera is a 89 y.o. female who PMH HTN, CKD stage III, dementia, anemia;, anxiety;  presents to the ED s/p fall out of bed at home with associated head injury. It was reported per the family pt climbed out of bed and fell hitting the back of her head. Pt was recently admitted to our services last month which she had a fall at that itme. PT went to rehab and was reportedly discharged from rehab the day of arrival in the ED. No reports of LOC, no reports of CP, OSB, N/V/D or fever per family reports. Labs reviewed demonstrated HH 9.2/28.2, BUN/Creat 22.8/1.09. CT of the head without contrast impression reviewed demonstrated no new or worsened intracranial abnormality. Re-demonstrated posterior right parietal scalp hematoma without depressed skull fracture. CT of the cervical spine without contrast no convincing acute cervical spine abnormality. Pt is pleasantly confused and can not provide accurate historical information.   Initial VS /75 P 76 R 16 T 99 F O2 saturation 98% on room air. Pt received zyprexa and doxycycline in the ED. Family is desiring NH placement. PT is admit to hospital medicine services for further management.     PAST MEDICAL HISTORY:   HTN  CKD stage  III  Dementia  Anemia  Vitamin D deficiency    PAST SURGICAL HISTORY:   Reviewed and negative    ALLERGIES:   Patient has no known allergies.    FAMILY HISTORY:   Reviewed and negative    SOCIAL HISTORY:   No reports tobacco use  No reports of illicit drug use  No reports of alcohol use  Lives with family    HOME MEDICATIONS:   As documented  Prior to Admission medications    Medication Sig Start Date End Date Taking? Authorizing Provider   aspirin 81 MG Chew Take 81 mg by mouth once daily.    Provider, Merlene   benazepriL (LOTENSIN) 20 MG tablet Take 1 tablet (20 mg total) by mouth once daily. 8/30/23 11/28/23  Abdelrahman Watt MD   carvediloL (COREG) 3.125 MG tablet Take 1 tablet (3.125 mg total) by mouth 2 (two) times daily with meals. 8/30/23 8/29/24  Abdelrahman Watt MD   furosemide (LASIX) 20 MG tablet Take 1 tablet (20 mg total) by mouth 2 (two) times daily. 8/30/23 8/29/24  Abdelrahman Watt MD   QUEtiapine (SEROQUEL) 25 MG Tab Take 1 tablet (25 mg total) by mouth every evening. 8/30/23 8/29/24  Abdelrahman Watt MD       REVIEW OF SYSTEMS:   Unable to provide secondary to clinic condition    PHYSICAL EXAM:     VITAL SIGNS: 24 HRS MIN & MAX LAST   Temp  Min: 98.2 °F (36.8 °C)  Max: 99 °F (37.2 °C) 98.7 °F (37.1 °C)   BP  Min: 130/75  Max: 171/92 (!) 163/69   Pulse  Min: 69  Max: 92  70   Resp  Min: 14  Max: 23 (!) 23   SpO2  Min: 94 %  Max: 100 % (!) 94 %       General appearance: chronically ill appearing, elderly female, confused, pleasant, non-toxic, no family at bedside on rounds  HENT: posterior scalp contusion with hematoma, dry mucous membranes of oral cavity.  Eyes: PERRL  Lungs: Clear to auscultation bilaterally. No wheezing present.   Heart: Regular rate and rhythm. S1 and S2 present cap refill brisk  Abdomen: Soft, non-distended, non-tender. No rebound tenderness/guarding. Bowel sounds are normal.   Extremities: No cyanosis, clubbing, generalized weakness  Skin: warm and dry  Neuro: oriented to  person, confused, no acute focal deficits  Psych/mental status: flat affect, pleasantly confused    LABS AND IMAGING:     Recent Labs   Lab 09/27/23 0540 09/29/23 2143 09/30/23 0344   WBC 3.37* 4.10* 3.88*   RBC 2.90* 3.02* 3.21*   HGB 8.9* 9.2* 9.9*   HCT 27.3* 28.2* 30.0*   MCV 94.1* 93.4 93.5   MCH 30.7 30.5 30.8   MCHC 32.6* 32.6* 33.0   RDW 14.1 14.5 14.4    149 153   MPV 11.5* 11.3* 11.4*       Recent Labs   Lab 09/27/23 0540 09/29/23 2143 09/30/23 0344    141 145   K 3.8 3.7 3.3*   CO2 32* 28 27   BUN 33.7* 22.8* 19.4   CREATININE 1.05* 1.09* 0.94   CALCIUM 8.4 8.6 8.5   MG  --   --  2.10   ALBUMIN  --  2.8* 2.7*   ALKPHOS  --  90 84   ALT  --  9 9   AST  --  13 12   BILITOT  --  0.3 0.4       Microbiology Results (last 7 days)       Procedure Component Value Units Date/Time    Wound Culture [[id number]] Collected: 09/29/23 2205    Order Status: Sent Specimen: Abscess from Scalp Updated: 09/30/23 0045             CT Cervical Spine Without Contrast  EXAMINATION  CT CERVICAL SPINE WITHOUT CONTRAST    CLINICAL HISTORY  Neck trauma (Age >= 65y);    TECHNIQUE  Non-contrast helical-acquisition CT images of the cervical spine were obtained and multiplanar reformats accomplished by a CT technologist at a separate workstation, pushed to PACS for physician review.    COMPARISON  6 September 2023    FINDINGS  Images were reviewed in bone, soft tissue, and lung windows.    Exam quality: adequate for evaluation    Visualized levels: Skull base through T2.    No newly identified acute cortical displacement or other findings to suggest traumatic cervical spine injury.  Degenerative changes throughout the visualized spinal column are similar.    No prevertebral soft tissue swelling.  Paraspinal musculature and more superficial soft tissues are without acute finding.  Low-density nodule of the right hemithyroid unchanged.    IMPRESSION  1. No convincing acute cervical spine abnormality.  2. Chronic findings  without gross change from the exam a few weeks prior.  ==========    Please note ligament, spinal cord, and/or vascular abnormalities cannot be excluded on the basis of this examination.  Additional imaging recommended if there is elevated clinical concern for high-grade soft tissue injury.    RADIATION DOSE  Automated tube current modulation, weight-based exposure dosing, and/or iterative reconstruction technique utilized to reach lowest reasonably achievable exposure rate.    DLP: 1427 mGy*cm    Electronically signed by: Errol Whitten  Date:    09/29/2023  Time:    21:12  CT Head Without Contrast  EXAMINATION  CT HEAD WITHOUT CONTRAST    CLINICAL HISTORY  Head trauma, moderate-severe;    TECHNIQUE  Axial non-contrast CT images of the head were acquired and multiplanar reconstructions accomplished by a CT technologist at a separate workstation, pushed to PACS for physician review.    COMPARISON  6 September 2023    FINDINGS  Images were reviewed in subdural, brain, soft tissue, and bone windows.    Exam quality: Motion/streak artifact limits assessment of the posterior fossa.    Hemorrhage: No evidence of acute hyperattenuating blood products.    Parenchyma: There is diffuse bilateral supratentorial white matter hypoattenuation, typical of chronic microvascular changes. No discrete mass, mass effect, or CT evidence of acute large vascular territory insult. Gray-white differentiation is preserved.    Midline shift: None.    CSF spaces: Proportional appearance of ventricular and sulcal enlargement. No hydrocephalus. No masses or fluid collections.    Vasculature: No focally hyperdense artery. Scattered carotid siphon calcifications are present. No abnormal densities within the dural sinuses.    Other findings: Findings suggestive of large right posterior scalp hematoma unchanged in comparison.  No new abnormality of the extracranial soft tissues identified.  There is no depressed skull fracture.  Mastoids are well  aerated. No focal abnormality of the sella. The included facial structures are unremarkable.    IMPRESSION  1. No new or worsened intracranial abnormality.  2. Redemonstrated posterior right parietal scalp hematoma without depressed skull fracture.  3. Chronic findings above.    RADIATION DOSE  Automated tube current modulation, weight-based exposure dosing, and/or iterative reconstruction technique utilized to reach lowest reasonably achievable exposure rate.    DLP: 1427 mGy*cm    Electronically signed by: Errol Whitten  Date:    09/29/2023  Time:    21:10        ASSESSMENT & PLAN:   ASSESSMENT:  Acute encephalopathy- suspect chronic secondary to dementia- POA  Hematoma with contusion to posterior head- closed head injury- POA  Fall from bed with closed head injury- POA  Dementia- chronic, with behavorial disturbances- POA  HTN- essential- POA  CKD stage II- POA  Anemia- chronic disease- POA  Weakness- POA    PLAN:  Fall precautions  Neuro checks q 4 hours x 24 hours  PT OT eval and treat  Gentle IV hydration  Case management consult NH placement  Labs in the am  Home medication as deemed necessary       VTE Prophylaxis:  Lovenox for DVT prophylaxis and will be advised to be as mobile as possible     Patient condition:  Stable    __________________________________________________________________________  INPATIENT LIST OF MEDICATIONS     Scheduled Meds:   enoxparin  40 mg Subcutaneous Daily     Continuous Infusions:   lactated ringers       PRN Meds:.acetaminophen, aluminum-magnesium hydroxide-simethicone, melatonin, naloxone, ondansetron, polyethylene glycol, prochlorperazine, simethicone      IRuth FNP have reviewed and discussed the case with Dr. Finn Eugene.  Please see the following addendum for further assessment and plan from there attending BARBARA Moore   09/30/2023    --------------------------      I Dr. Hui Eugene performed substantive portion of the visit,  personally performed a face to face evaluation of the patient and have reviewed and agree with NP/PA documentation, treatment and plan & MDM.     89-year-old woman with history of dementia presented after a fall resulting in head injury.  Imaging revealed posterior right parietal scalp hematoma with no skull fractures.  Agree with the HPI, examined, plan mentioned above.  When seen at bedside she was lethargic, does not follow commands.  No family member at bedside.  We will continue neuro checks, maintain strict blood pressure control.  Will add therapy services when appropriate.  Also review the home medications.   will need placement.

## 2023-09-30 NOTE — FIRST PROVIDER EVALUATION
Medical screening examination initiated.  I have conducted a focused provider triage encounter, findings are as follows:    Brief history of present illness:  89 year old female presents to ER with fall. Son reports patient fell out of bed.     There were no vitals filed for this visit.    Pertinent physical exam:  Awake and alert, nad. Large hematoma to occipital region     Brief workup plan:  imaging     Preliminary workup initiated; this workup will be continued and followed by the physician or advanced practice provider that is assigned to the patient when roomed.

## 2023-10-01 PROCEDURE — 25000003 PHARM REV CODE 250: Performed by: INTERNAL MEDICINE

## 2023-10-01 PROCEDURE — 25000003 PHARM REV CODE 250: Performed by: NURSE PRACTITIONER

## 2023-10-01 PROCEDURE — 63600175 PHARM REV CODE 636 W HCPCS: Performed by: NURSE PRACTITIONER

## 2023-10-01 PROCEDURE — 11000001 HC ACUTE MED/SURG PRIVATE ROOM

## 2023-10-01 RX ORDER — POTASSIUM CHLORIDE 20 MEQ/1
20 TABLET, EXTENDED RELEASE ORAL ONCE
Status: COMPLETED | OUTPATIENT
Start: 2023-10-01 | End: 2023-10-01

## 2023-10-01 RX ADMIN — LISINOPRIL 20 MG: 20 TABLET ORAL at 09:10

## 2023-10-01 RX ADMIN — CARVEDILOL 3.12 MG: 3.12 TABLET, FILM COATED ORAL at 05:10

## 2023-10-01 RX ADMIN — POTASSIUM CHLORIDE 20 MEQ: 1500 TABLET, EXTENDED RELEASE ORAL at 09:10

## 2023-10-01 RX ADMIN — ENOXAPARIN SODIUM 40 MG: 40 INJECTION SUBCUTANEOUS at 05:10

## 2023-10-01 RX ADMIN — Medication 6 MG: at 08:10

## 2023-10-01 RX ADMIN — CARVEDILOL 3.12 MG: 3.12 TABLET, FILM COATED ORAL at 09:10

## 2023-10-01 RX ADMIN — QUETIAPINE FUMARATE 25 MG: 25 TABLET ORAL at 08:10

## 2023-10-01 NOTE — PROGRESS NOTES
[facility] Hospital Medicine Progress Note        Chief Complaint: Inpatient Follow-up for fall    HPI:   Danielle Loera is a 89 y.o. female who PMH HTN, CKD stage III, dementia, anemia;, anxiety;  presents to the ED s/p fall out of bed at home with associated head injury. It was reported per the family pt climbed out of bed and fell hitting the back of her head. Pt was recently admitted to our services last month which she had a fall at that itme. PT went to rehab and was reportedly discharged from rehab the day of arrival in the ED. No reports of LOC, no reports of CP, OSB, N/V/D or fever per family reports.     Initial VS /75 P 76 R 16 T 99 F O2 saturation 98% on room air.  Labs reviewed demonstrated HH 9.2/28.2, BUN/Creat 22.8/1.09. CT of the head without contrast impression reviewed demonstrated no new or worsened intracranial abnormality. Re-demonstrated posterior right parietal scalp hematoma without depressed skull fracture. CT of the cervical spine without contrast no convincing acute cervical spine abnormality. Pt is pleasantly confused and can not provide accurate historical information. Pt received zyprexa and doxycycline in the ED. Family is desiring NH placement. PT is admit to hospital medicine services for further management.       Interval Hx:   Blood pressure elevated.  She was alert, comfortably resting in the bed.  Appeared lethargic.  No family member at bedside.  Patient is agreeable for placement if recommended.  Routine labs were not obtained for this morning.    Objective/physical exam:  Vitals:    09/30/23 1651 09/30/23 2101 10/01/23 0015 10/01/23 0453   BP: (!) 162/85 (!) 147/78 (!) 157/85 (!) 146/73   Pulse: 81 66 64 60   Resp:  20     Temp: 97.9 °F (36.6 °C) 97.6 °F (36.4 °C) 97.7 °F (36.5 °C) 98.1 °F (36.7 °C)   TempSrc: Oral  Oral Axillary   SpO2: 96% 99% 98% 97%   Weight:       Height:         General: In no acute distress, afebrile,  lethargic  Skin: Posterior scalp contusion with hematoma  Respiratory: Clear to auscultation bilaterally  Cardiovascular: S1, S2, no appreciable murmur  Abdomen: Soft, nontender, BS +  MSK: Warm, no lower extremity edema, no clubbing or cyanosis  Neurologic: Alert , confused during conversation, moving all extremities with good strength     Lab Results   Component Value Date     09/30/2023    K 3.3 (L) 09/30/2023    CO2 27 09/30/2023    BUN 19.4 09/30/2023    CREATININE 0.94 09/30/2023    CALCIUM 8.5 09/30/2023    EGFRNONAA 56 06/25/2020      Lab Results   Component Value Date    ALT 9 09/30/2023    AST 12 09/30/2023    ALKPHOS 84 09/30/2023    BILITOT 0.4 09/30/2023      Lab Results   Component Value Date    WBC 3.88 (L) 09/30/2023    WBC 3.88 09/30/2023    HGB 9.9 (L) 09/30/2023    HCT 30.0 (L) 09/30/2023    MCV 93.5 09/30/2023     09/30/2023           Medications:   carvediloL  3.125 mg Oral BID WM    enoxparin  40 mg Subcutaneous Daily    lisinopriL  20 mg Oral Daily    potassium chloride  20 mEq Oral Once    QUEtiapine  25 mg Oral QHS      acetaminophen, aluminum-magnesium hydroxide-simethicone, hydrALAZINE, melatonin, naloxone, ondansetron, polyethylene glycol, prochlorperazine, simethicone     Assessment/Plan:    Acute encephalopathy in the setting of dementia  Recent closed head injury from fall, resulting in hematoma with confusion  Dementia with behavioral disturbances     HX:  Advanced age, HTN, CKD stage 2, ACD    Plan:  -continue neuro checks, seizure precautions, fall precautions   -encourage p.o. intake.  Continue gentle IV hydration.    -Continue Coreg, lisinopril at current doses  -other home medications we will be continued.  Continue quetiapine at nighttime  - will need NH placement    Magdalena Eugene MD

## 2023-10-02 LAB — BACTERIA WND CULT: ABNORMAL

## 2023-10-02 PROCEDURE — 25000003 PHARM REV CODE 250: Performed by: NURSE PRACTITIONER

## 2023-10-02 PROCEDURE — 97162 PT EVAL MOD COMPLEX 30 MIN: CPT

## 2023-10-02 PROCEDURE — 97166 OT EVAL MOD COMPLEX 45 MIN: CPT

## 2023-10-02 PROCEDURE — 63600175 PHARM REV CODE 636 W HCPCS: Performed by: NURSE PRACTITIONER

## 2023-10-02 PROCEDURE — G0378 HOSPITAL OBSERVATION PER HR: HCPCS

## 2023-10-02 PROCEDURE — 96372 THER/PROPH/DIAG INJ SC/IM: CPT | Performed by: NURSE PRACTITIONER

## 2023-10-02 PROCEDURE — 25000003 PHARM REV CODE 250: Performed by: INTERNAL MEDICINE

## 2023-10-02 RX ORDER — SACUBITRIL AND VALSARTAN 24; 26 MG/1; MG/1
1 TABLET, FILM COATED ORAL 2 TIMES DAILY
COMMUNITY

## 2023-10-02 RX ORDER — DONEPEZIL HYDROCHLORIDE 5 MG/1
5 TABLET, FILM COATED ORAL NIGHTLY
Status: ON HOLD | COMMUNITY
End: 2023-10-27 | Stop reason: HOSPADM

## 2023-10-02 RX ORDER — BREXPIPRAZOLE 1 MG/1
1 TABLET ORAL DAILY
Status: ON HOLD | COMMUNITY
End: 2023-10-27 | Stop reason: HOSPADM

## 2023-10-02 RX ORDER — DOXYCYCLINE HYCLATE 100 MG
100 TABLET ORAL EVERY 12 HOURS
Status: DISCONTINUED | OUTPATIENT
Start: 2023-10-02 | End: 2023-10-06

## 2023-10-02 RX ORDER — BUSPIRONE HYDROCHLORIDE 10 MG/1
10 TABLET ORAL 3 TIMES DAILY
Status: ON HOLD | COMMUNITY
End: 2023-10-27 | Stop reason: HOSPADM

## 2023-10-02 RX ADMIN — Medication 6 MG: at 09:10

## 2023-10-02 RX ADMIN — LISINOPRIL 20 MG: 20 TABLET ORAL at 08:10

## 2023-10-02 RX ADMIN — QUETIAPINE FUMARATE 25 MG: 25 TABLET ORAL at 09:10

## 2023-10-02 RX ADMIN — ENOXAPARIN SODIUM 40 MG: 40 INJECTION SUBCUTANEOUS at 05:10

## 2023-10-02 RX ADMIN — DOXYCYCLINE HYCLATE 100 MG: 100 TABLET, COATED ORAL at 02:10

## 2023-10-02 RX ADMIN — CARVEDILOL 3.12 MG: 3.12 TABLET, FILM COATED ORAL at 08:10

## 2023-10-02 RX ADMIN — CARVEDILOL 3.12 MG: 3.12 TABLET, FILM COATED ORAL at 05:10

## 2023-10-02 RX ADMIN — DOXYCYCLINE HYCLATE 100 MG: 100 TABLET, COATED ORAL at 09:10

## 2023-10-02 NOTE — PLAN OF CARE
10/02/23 1421   Discharge Reassessment   Assessment Type Discharge Planning Reassessment   Discharge Plan discussed with: Patient;Adult children   Discharge Plan A New Nursing Home placement - [facility] facility   Discharge Plan B Skilled Nursing Facility   Post-Acute Status   Post-Acute Authorization Placement   Post-Acute Placement Status Referrals Sent  (ZHANE Hatch)

## 2023-10-02 NOTE — PROGRESS NOTES
[facility] - 9th Floor Med Surg  Wound Care    Patient Name:  Danielle Loera   MRN:  [id number]  Date: 10/2/2023  Diagnosis: Frequent falls    History:     Past Medical History:   Diagnosis Date    Anemia due to stage 3a chronic kidney disease 5/12/2023    Primary hypertension 5/12/2023    Severe dementia without behavioral disturbance, psychotic disturbance, mood disturbance, or anxiety 2/23/2023    Vitamin D deficiency 5/12/2023       Social History     Socioeconomic History    Marital status:     Number of children: 4   Occupational History    Occupation: retied   Tobacco Use    Smoking status: Never     Passive exposure: Never    Smokeless tobacco: Never   Substance and Sexual Activity    Alcohol use: Never    Drug use: Never    Sexual activity: Not Currently     Social Determinants of Health     Financial Resource Strain: Low Risk  (8/2/2023)    Overall Financial Resource Strain (CARDIA)     Difficulty of Paying Living Expenses: Not hard at all   Food Insecurity: No Food Insecurity (8/2/2023)    Hunger Vital Sign     Worried About Running Out of Food in the Last Year: Never true     Ran Out of Food in the Last Year: Never true   Transportation Needs: No Transportation Needs (8/2/2023)    PRAPARE - Transportation     Lack of Transportation (Medical): No     Lack of Transportation (Non-Medical): No   Physical Activity: Insufficiently Active (8/2/2023)    Exercise Vital Sign     Days of Exercise per Week: 1 day     Minutes of Exercise per Session: 10 min   Stress: No Stress Concern Present (8/2/2023)    [employer] - Occupational Stress Questionnaire     Feeling of Stress : Not at all   Social Connections: Moderately Isolated (8/2/2023)    Social Connection and Isolation Panel [NHANES]     Frequency of Communication with Friends and Family: More than three times a week     Frequency of Social Gatherings with Friends and Family: More than three times a week      Attends Islam Services: More than 4 times per year     Active Member of Clubs or Organizations: No     Attends Club or Organization Meetings: Never     Marital Status:    Housing Stability: Low Risk  (8/2/2023)    Housing Stability Vital Sign     Unable to Pay for Housing in the Last Year: No     Number of Places Lived in the Last Year: 1     Unstable Housing in the Last Year: No       Precautions:     Allergies as of 09/29/2023    (No Known Allergies)       WOC Assessment Details/Treatment        10/02/23 1126   WOCN Assessment   Visit Date 10/02/23   Visit Time 1126   Consult Type New   WOCN Speciality Wound   Intervention assessed;chart review;orders   Teaching on-going        Altered Skin Integrity 10/02/23 posterior Head Other (comment)   Date First Assessed: 10/02/23   Altered Skin Integrity Present on Admission - Did Patient arrive to the hospital with altered skin?: yes  Orientation: posterior  Location: Head  Primary Wound Type: (c) Other (comment)   Wound Image    Dressing Appearance Intact   Drainage Amount Small   Drainage Characteristics/Odor Serosanguineous   Tissue loss description Full thickness   Periwound Area   (Hair)   Wound Edges Defined   Undermining (depth (cm)/location) 3 cm circumferential   Care Cleansed with:;Sterile normal saline   Packing packed with;gauze, iodoform     WOCN consulted for occiput I&D. Treatment recommendations put into place.  Occiput head: Cleanse with NS. Pack with 1/4 inch iodoform. Cover foam dressing or medipore tape. Change daily. Nursing to continue with preventative measures.    10/02/2023

## 2023-10-02 NOTE — CODE 44
"MEDICARE CONDITION 44     (Reference: Transmittal 200 of the Medicare Manual)     A Medicare "Inpatient Admission" may be changed to an "Outpatient" (includes  Outpatient Observation) status, if the following conditions exist:  The change in patient status from inpatient to outpatient is made prior to        discharge or release, while the patient is still a patient in the hospital.   The hospital has not submitted a claim for the inpatient admission.  A physician concurs with the Utilization Committee decision.   Physician concurrence with the Utilization Committee's decision is documented         in the patient's records.      The entire case has been reviewed with Finn Eugene MD, attending physician and me, physician advisor/member of the Utilization Management Committee. We are both in agreement that this should be an Outpatient/Observation encounter because the patient did not meet medical necessity for inpatient admission. The patient and/or patient representative have been notified of the change in billing status.           Tadeo Murray MD  Utilization Management  Physician Advisor  [facility]      "

## 2023-10-02 NOTE — PROGRESS NOTES
[facility] Hospital Medicine Progress Note        Chief Complaint: Inpatient Follow-up for fall    HPI:   Danielle Loera is a 89 y.o. female who PMH HTN, CKD stage III, dementia, anemia;, anxiety;  presents to the ED s/p fall out of bed at home with associated head injury. It was reported per the family pt climbed out of bed and fell hitting the back of her head. Pt was recently admitted to our services last month which she had a fall at that itme. PT went to rehab and was reportedly discharged from rehab the day of arrival in the ED. No reports of LOC, no reports of CP, OSB, N/V/D or fever per family reports.     Initial VS /75 P 76 R 16 T 99 F O2 saturation 98% on room air.  Labs reviewed demonstrated HH 9.2/28.2, BUN/Creat 22.8/1.09. CT of the head without contrast impression reviewed demonstrated no new or worsened intracranial abnormality. Re-demonstrated posterior right parietal scalp hematoma without depressed skull fracture. CT of the cervical spine without contrast no convincing acute cervical spine abnormality. Pt is pleasantly confused and can not provide accurate historical information. Pt received zyprexa and doxycycline in the ED. Family is desiring NH placement. PT is admit to hospital medicine services for further management.       Interval Hx:   Blood pressure minimally elevated.  Otherwise she was alert, comfortably resting.  Denies any new complaints or concerns.  Doing well on room air.  No family member at bedside.  Wound cultures confirmed as MRSA     Objective/physical exam:  Vitals:    10/01/23 1711 10/01/23 1942 10/02/23 0026 10/02/23 0733   BP: (!) 148/68 (!) 141/70 131/69 (!) 141/70   Pulse:  71 62 64   Resp:  20 16 18   Temp:  97.9 °F (36.6 °C) 97.6 °F (36.4 °C) 98 °F (36.7 °C)   TempSrc:    Oral   SpO2:  99% 97%    Weight:       Height:         General: In no acute distress, afebrile, lethargic  Skin: Posterior scalp contusion with  hematoma  Respiratory: Clear to auscultation bilaterally  Cardiovascular: S1, S2, no appreciable murmur  Abdomen: Soft, nontender, BS +  MSK: Warm, no lower extremity edema, no clubbing or cyanosis  Neurologic: Alert , confused during conversation, moving all extremities with good strength     Lab Results   Component Value Date     09/30/2023    K 3.3 (L) 09/30/2023    CO2 27 09/30/2023    BUN 19.4 09/30/2023    CREATININE 0.94 09/30/2023    CALCIUM 8.5 09/30/2023    EGFRNONAA 56 06/25/2020      Lab Results   Component Value Date    ALT 9 09/30/2023    AST 12 09/30/2023    ALKPHOS 84 09/30/2023    BILITOT 0.4 09/30/2023      Lab Results   Component Value Date    WBC 3.88 (L) 09/30/2023    WBC 3.88 09/30/2023    HGB 9.9 (L) 09/30/2023    HCT 30.0 (L) 09/30/2023    MCV 93.5 09/30/2023     09/30/2023           Medications:   carvediloL  3.125 mg Oral BID WM    enoxparin  40 mg Subcutaneous Daily    lisinopriL  20 mg Oral Daily    QUEtiapine  25 mg Oral QHS      acetaminophen, aluminum-magnesium hydroxide-simethicone, hydrALAZINE, melatonin, naloxone, ondansetron, polyethylene glycol, prochlorperazine, simethicone     Assessment/Plan:    Acute encephalopathy in the setting of dementia  Recent closed head injury from fall, resulting in hematoma with confusion  MRSA positive scalp wound cultures  Dementia with behavioral disturbances     HX:  Advanced age, HTN, CKD stage 2, ACD    Plan:  -wound cultures confirmed as MRSA sensitive to doxycycline.  We will continue doxy for now.  No indication for escalation to vancomycin.  Continue wound care  -neuro checks, seizure precautions, fall precautions   -continue gentle IV hydration.  Encourage p.o. intake.  Monitor for aspiration  -continue, lisinopril and Coreg at current doses   -other home medications were reviewed and renewed.  Continue quetiapine at nighttime  -needs nursing home placement     Magdalena Eugene MD

## 2023-10-02 NOTE — PLAN OF CARE
Problem: Occupational Therapy  Goal: Occupational Therapy Goal  Description: Goals to be met by: 11/2/2023    Patient will increase functional independence with ADLs by performing:    UE Dressing with Contact Guard Assistance.  Grooming while standing at sink with Stand-by Assistance.  Toileting from toilet with Stand-by Assistance for hygiene and clothing management.   Toilet transfer to toilet with Stand-by Assistance.    Outcome: Ongoing, Progressing

## 2023-10-02 NOTE — PLAN OF CARE
10/02/23 1019   Discharge Assessment   Assessment Type Discharge Planning Assessment   Confirmed/corrected address, phone number and insurance Yes   Confirmed Demographics Correct on Facesheet   Source of Information family   People in Home alone   Do you expect to return to your current living situation? No   Do you have help at home or someone to help you manage your care at home? Yes   Home Accessibility stairs to enter home   Number of Stairs, Main Entrance two   Home Layout Able to live on 1st floor   Equipment Currently Used at Home hospital bed;bedside commode;bath bench;walker, rolling;wheelchair;shower chair   Readmission within 30 days? Yes   Patient currently being followed by outpatient case management? No   Do you currently have service(s) that help you manage your care at home? No   Do you take prescription medications? Yes   Do you have prescription coverage? Yes   Do you have any problems affording any of your prescribed medications? No   Is the patient taking medications as prescribed? yes   How do you get to doctors appointments? family or friend will provide   Are you on dialysis? No   Do you take coumadin? No   DME Needed Upon Discharge  none   Discharge Plan discussed with: Patient   Discharge Plan A Skilled Nursing Facility   Discharge Plan B Skilled Nursing Facility     Assessment completed with son via phone. Patient lives alone in a single story home with 2 steps upon entrance. Patient's children alternate staying in home. Family interested in NH placement. Meeting with patient's son this afternoon.

## 2023-10-02 NOTE — NURSING
Nurses Note -- 4 Eyes      10/2/2023   4:30 PM      Skin assessed during: Q Shift Change      [] No Altered Skin Integrity Present    []Prevention Measures Documented      [x] Yes- Altered Skin Integrity Present or Discovered   [] LDA Added if Not in Epic (Describe Wound)   [] New Altered Skin Integrity was Present on Admit and Documented in LDA   [] Wound Image Taken    Wound Care Consulted? Yes    Attending Nurse:  Fatmata Eric RN/Staff Member:   Umang     Head laceration  Bilateral heal redness  Sacral redness

## 2023-10-02 NOTE — PT/OT/SLP EVAL
Occupational Therapy  Evaluation    Name: Danielle Leora  MRN: [id number]  Admitting Diagnosis: frequent falls, dementia with behavioral disturbance and infected hematoma  Recent Surgery: * No surgery found *      Recommendations:     Discharge Recommendations: nursing facility, skilled  Discharge Equipment Recommendations:  bedside commode, shower chair  Barriers to discharge:  Other (Comment) (Cognitive status)    Assessment:     Danielle Loera is a 89 y.o. female with a medical diagnosis of frequent falls, dementia with behavioral disturbance. Pt with recent fall OOB resulting in a hematoma on her occiput; hematoma now infected. Recently discharged from rehab facility. Pt found lying supine in bed, she was pleasantly confused and amenable to therapy. She presents with the following performance deficits affecting function: weakness, impaired endurance, impaired self care skills, impaired functional mobility, impaired cognition, decreased upper extremity function, impaired balance.  Upon discharge, this patient would benefit from SNF.    Rehab Prognosis: Good; patient would benefit from acute skilled OT services to address these deficits and reach maximum level of function.       Plan:     Patient to be seen 4 x/week to address the above listed problems via self-care/home management, therapeutic activities, therapeutic exercises  Plan of Care Expires: 11/02/23  Plan of Care Reviewed with: patient    Subjective     Chief Complaint: no complaints  Patient/Family Comments/goals: TBD    Occupational Profile:  Living Environment: Pt reports that she lives in [facility] home with her son with a sunken den. Pt unreliable historian d/t cognition. Will try to f/u with family to get a better picture of environment.  Previous level of function: TBD but appears to need at least MOD A for ADLs  Roles and Routines: Mother  Equipment Used at Home: walker, rolling, wheelchair  Assistance upon Discharge: Son    Pain/Comfort:  Pain  Rating 1: 0/10    Patients cultural, spiritual, Episcopal conflicts given the current situation: no    Objective:     OT communicated with RN prior to session.      Patient was found HOB elevated with  (Roll belt) upon OT entry to room.    General Precautions: Standard, fall  Orthopedic Precautions: N/A  Braces: N/A    Vital Signs: Blood Pressure: 117/75    Bed Mobility:    Patient completed Rolling/Turning to Right with minimum assistance  Patient completed Supine to Sit with minimum assistance    Functional Mobility/Transfers:  Patient completed Sit <> Stand Transfer with moderate assistance  with  rolling walker   Patient completed Bed <> Chair Transfer using Step Transfer technique with moderate assistance with rolling walker  Functional Mobility: Pt performed sit<>stand requiring MOD A and cues to maintain task, pt able to perform step transfer using RW and gait belt to sit in chair, pt requires multiple VC and redirection to task. Performed second sit<>stand to place chair alarm and pt increased to MAX A.    Activities of Daily Living:  Toileting: total assistance Pt wearing brief upon OT entry    Functional Cognition:  Affect: Flat and occasionally smiled  Orientation: oriented to Person and she was able to answer yes when prompted that she was in the hospital, able to state month and year she was born not day.  Attention: Easily distracted, pt often got distracted during session requiring redirection to task and multiple VCs  Command Following: Follows simple one-step commands with 25% accuracy and Requires verbal cues    Visual Perceptual Skills:  Pt able to track and fixate while OTs were talking to her but unable to follow command to track finger without moving head to check visual fields 2/2 impaired cognition    Upper Extremity Function:  Right Upper Extremity:   Range of Motion: Impaired. Pt unable to perform full shoulder flexion actively (2-/5), PROM shoulder flexion ~120 degrees  Strength:  Impaired. Grossly 3/5 distally, shoulder ROM impaired (2-/5) pt unable to follow all commands for formal MMT dt impaired cognition     Left Upper Extremity:  Range of Motion: Impaired. Pt unable to perform full shoulder flexion actively (2-/5), PROM shoulder flexion ~130 degrees  Strength: Impaired. Grossly 3/5 distally, shoulder ROM impaired (2-/5); pt unable to formally complete MMT dt cognition    Balance:   Intact    Therapeutic Positioning  Risk for acquired pressure injuries is increased due to impaired mobility and impaired cognition.    OT interventions performed during the course of today's session:   Education was provided on benefits of and recommendations for therapeutic positioning  Positioning plan discussed with team  All bony prominences off loaded    Skin assessment: all bony prominences were assessed    Findings: new area of altered skin integrity discovered at bilateral heels, redness but blanched quickly, OT provided heel floats at end of session    OT recommendations for therapeutic positioning throughout hospitalization:   Follow [facility] Pressure Injury Prevention Protocol  [facility] when [facility]      Patient Education:  Patient provided with verbal education education regarding OT role/goals/POC, safety awareness, and pressure ulcer prevention.  Understanding was verbalized, however additional teaching warranted.     Patient left up in chair with all lines intact, all needs within reach, chair alarm on and RN notified    GOALS:   Multidisciplinary Problems       Occupational Therapy Goals          Problem: Occupational Therapy    Goal Priority Disciplines Outcome Interventions   Occupational Therapy Goal     OT, PT/OT Ongoing, Progressing    Description: Goals to be met by: 11/2/2023    Patient will increase functional independence with ADLs by performing:    UE Dressing with Contact Guard Assistance.  Grooming while seated at sink with Minimal Assistance.  Toileting from toilet with Minimal Assistance  for hygiene and clothing management.   Toilet transfer to toilet with Minimal Assistance.                         History:     Past Medical History:   Diagnosis Date    Anemia due to stage 3a chronic kidney disease 5/12/2023    Primary hypertension 5/12/2023    Severe dementia without behavioral disturbance, psychotic disturbance, mood disturbance, or anxiety 2/23/2023    Vitamin D deficiency 5/12/2023       No past surgical history on file.    Time Tracking:     OT Date of Treatment:    OT Start Time: 1000  OT Stop Time: 1027  OT Total Time (min): 27 min    Billable Minutes:Evaluation MOD    10/2/2023

## 2023-10-03 LAB
ANION GAP SERPL CALC-SCNC: 5 MEQ/L
BASOPHILS # BLD AUTO: 0.02 X10(3)/MCL
BASOPHILS NFR BLD AUTO: 0.6 %
BUN SERPL-MCNC: 13.7 MG/DL (ref 9.8–20.1)
CALCIUM SERPL-MCNC: 8.5 MG/DL (ref 8.4–10.2)
CHLORIDE SERPL-SCNC: 105 MMOL/L (ref 98–111)
CO2 SERPL-SCNC: 30 MMOL/L (ref 23–31)
CREAT SERPL-MCNC: 0.86 MG/DL (ref 0.55–1.02)
CREAT/UREA NIT SERPL: 16
EOSINOPHIL # BLD AUTO: 0.07 X10(3)/MCL (ref 0–0.9)
EOSINOPHIL NFR BLD AUTO: 2.2 %
ERYTHROCYTE [DISTWIDTH] IN BLOOD BY AUTOMATED COUNT: 14.8 % (ref 11.5–17)
GFR SERPLBLD CREATININE-BSD FMLA CKD-EPI: >60 MLS/MIN/1.73/M2
GLUCOSE SERPL-MCNC: 94 MG/DL (ref 75–121)
HCT VFR BLD AUTO: 35.6 % (ref 37–47)
HGB BLD-MCNC: 11.5 G/DL (ref 12–16)
IMM GRANULOCYTES # BLD AUTO: 0.09 X10(3)/MCL (ref 0–0.04)
IMM GRANULOCYTES NFR BLD AUTO: 2.8 %
LYMPHOCYTES # BLD AUTO: 1.45 X10(3)/MCL (ref 0.6–4.6)
LYMPHOCYTES NFR BLD AUTO: 45.3 %
MAGNESIUM SERPL-MCNC: 2 MG/DL (ref 1.6–2.6)
MCH RBC QN AUTO: 30.4 PG (ref 27–31)
MCHC RBC AUTO-ENTMCNC: 32.3 G/DL (ref 33–36)
MCV RBC AUTO: 94.2 FL (ref 80–94)
MONOCYTES # BLD AUTO: 0.18 X10(3)/MCL (ref 0.1–1.3)
MONOCYTES NFR BLD AUTO: 5.6 %
NEUTROPHILS # BLD AUTO: 1.39 X10(3)/MCL (ref 2.1–9.2)
NEUTROPHILS NFR BLD AUTO: 43.5 %
NRBC BLD AUTO-RTO: 0 %
PLATELET # BLD AUTO: 115 X10(3)/MCL (ref 130–400)
PMV BLD AUTO: 11.7 FL (ref 7.4–10.4)
POTASSIUM SERPL-SCNC: 3.9 MMOL/L (ref 3.5–5.1)
RBC # BLD AUTO: 3.78 X10(6)/MCL (ref 4.2–5.4)
SODIUM SERPL-SCNC: 140 MMOL/L (ref 132–146)
WBC # SPEC AUTO: 3.2 X10(3)/MCL (ref 4.5–11.5)

## 2023-10-03 PROCEDURE — 80048 BASIC METABOLIC PNL TOTAL CA: CPT | Performed by: INTERNAL MEDICINE

## 2023-10-03 PROCEDURE — 11000001 HC ACUTE MED/SURG PRIVATE ROOM

## 2023-10-03 PROCEDURE — 97530 THERAPEUTIC ACTIVITIES: CPT | Mod: CQ

## 2023-10-03 PROCEDURE — 97110 THERAPEUTIC EXERCISES: CPT

## 2023-10-03 PROCEDURE — 83735 ASSAY OF MAGNESIUM: CPT | Performed by: INTERNAL MEDICINE

## 2023-10-03 PROCEDURE — 85025 COMPLETE CBC W/AUTO DIFF WBC: CPT | Performed by: INTERNAL MEDICINE

## 2023-10-03 PROCEDURE — 97116 GAIT TRAINING THERAPY: CPT | Mod: CQ

## 2023-10-03 PROCEDURE — 97535 SELF CARE MNGMENT TRAINING: CPT

## 2023-10-03 PROCEDURE — 63600175 PHARM REV CODE 636 W HCPCS: Performed by: NURSE PRACTITIONER

## 2023-10-03 PROCEDURE — 25000003 PHARM REV CODE 250: Performed by: NURSE PRACTITIONER

## 2023-10-03 PROCEDURE — 25000003 PHARM REV CODE 250: Performed by: INTERNAL MEDICINE

## 2023-10-03 RX ORDER — BISACODYL 10 MG
10 SUPPOSITORY, RECTAL RECTAL DAILY PRN
Status: DISCONTINUED | OUTPATIENT
Start: 2023-10-03 | End: 2023-10-27 | Stop reason: HOSPADM

## 2023-10-03 RX ORDER — AMOXICILLIN 250 MG
1 CAPSULE ORAL 2 TIMES DAILY
Status: DISCONTINUED | OUTPATIENT
Start: 2023-10-03 | End: 2023-10-27 | Stop reason: HOSPADM

## 2023-10-03 RX ORDER — POLYETHYLENE GLYCOL 3350 17 G/17G
17 POWDER, FOR SOLUTION ORAL DAILY
Status: DISCONTINUED | OUTPATIENT
Start: 2023-10-03 | End: 2023-10-27 | Stop reason: HOSPADM

## 2023-10-03 RX ADMIN — LISINOPRIL 20 MG: 20 TABLET ORAL at 10:10

## 2023-10-03 RX ADMIN — CARVEDILOL 3.12 MG: 3.12 TABLET, FILM COATED ORAL at 10:10

## 2023-10-03 RX ADMIN — DOXYCYCLINE HYCLATE 100 MG: 100 TABLET, COATED ORAL at 10:10

## 2023-10-03 RX ADMIN — DOXYCYCLINE HYCLATE 100 MG: 100 TABLET, COATED ORAL at 08:10

## 2023-10-03 RX ADMIN — POLYETHYLENE GLYCOL 3350 17 G: 17 POWDER, FOR SOLUTION ORAL at 05:10

## 2023-10-03 RX ADMIN — QUETIAPINE FUMARATE 12.5 MG: 25 TABLET ORAL at 05:10

## 2023-10-03 RX ADMIN — Medication 6 MG: at 08:10

## 2023-10-03 RX ADMIN — QUETIAPINE FUMARATE 25 MG: 25 TABLET ORAL at 08:10

## 2023-10-03 RX ADMIN — SENNOSIDES AND DOCUSATE SODIUM 1 TABLET: 50; 8.6 TABLET ORAL at 08:10

## 2023-10-03 RX ADMIN — CARVEDILOL 3.12 MG: 3.12 TABLET, FILM COATED ORAL at 05:10

## 2023-10-03 RX ADMIN — ENOXAPARIN SODIUM 40 MG: 40 INJECTION SUBCUTANEOUS at 05:10

## 2023-10-03 NOTE — PT/OT/SLP EVAL
Occupational Therapy   Treatment    Name: Danielle Loera  MRN: [id number]  Admitting Diagnosis:  Frequent falls       Recommendations:     Discharge Recommendations: nursing facility, skilled  Discharge Equipment Recommendations:  bedside commode, shower chair  Barriers to discharge:       Assessment:     Danielle Loera is a 89 y.o. female with a medical diagnosis of Frequent falls.  Upon OT entry, sheets soiled and gown mostly off. Pt with c/o needing to be changed and wanting new clothes. Pt with overall very good effort with mobility and ax's. Pt able to follow 1-step commands and perform bed mobility with CGA. Pt requested to go to the bathroom and ambulated in min A/CGA and RW. Mod A for clothing management. Pt remains fairly disoriented and unable to state her name or where she was at this time. Performance deficits affecting function are weakness, impaired endurance, impaired self care skills, impaired functional mobility, impaired cognition, decreased upper extremity function, impaired balance.     Rehab Prognosis:  Good; patient would benefit from acute skilled OT services to address these deficits and reach maximum level of function.       Plan:     Patient to be seen 4 x/week to address the above listed problems via self-care/home management, therapeutic activities, therapeutic exercises  Plan of Care Expires: 11/02/23  Plan of Care Reviewed with: patient    Subjective     Pain/Comfort:  Pain Rating 1: 0/10    Objective:     Communicated with: Nsandrés prior to session.  Patient found supine with bed alarm upon OT entry to room.    General Precautions: Standard, fall    Orthopedic Precautions:N/A  Braces: N/A     Occupational Performance:     Bed Mobility:    Patient completed Rolling/Turning to Left with  contact guard assistance  Patient completed Rolling/Turning to Right with contact guard assistance  Patient completed Supine to Sit with minimum assistance     Functional Mobility/Transfers:  Patient  completed Sit <> Stand Transfer with minimum assistance  with  rolling walker   Functional Mobility: ambulated to toilet and back to recliner next to her bed with RW and CGA    Activities of Daily Living:  Toileting: moderate assistance don brief after toileting, pt able to complete keyon hygiene but with some difficulties        Patient left up in chair with all lines intact, call button in reach, and chair alarm on    GOALS:   Multidisciplinary Problems       Occupational Therapy Goals          Problem: Occupational Therapy    Goal Priority Disciplines Outcome Interventions   Occupational Therapy Goal     OT, PT/OT Ongoing, Progressing    Description: Goals to be met by: 11/2/2023    Patient will increase functional independence with ADLs by performing:    UE Dressing with Contact Guard Assistance.  Grooming while seated at sink with Minimal Assistance.  Toileting from toilet with Minimal Assistance for hygiene and clothing management.   Toilet transfer to toilet with Minimal Assistance.                         Time Tracking:     OT Date of Treatment: 10/03/23  OT Start Time: 0945  OT Stop Time: 1017  OT Total Time (min): 32 min    Billable Minutes:Self Care/Home Management 17, Therapeutic Ex 15    OT/JUSTIN: OT     Number of JUSTIN visits since last OT visit: 0    10/3/2023

## 2023-10-03 NOTE — PLAN OF CARE
Sent updates to [facility]. Awaiting decision at this time.     Will reach out to Tootie at [facility] for a decision.    Spoke to Tootie at [facility]. This patient's referral is currently under review. She will touch base with me once a decision has been made.    Patient's referral denied by [facility] due to being out of network. Called Tootie at [facility] to remind her that this patient is seeking long term placement. She stated that she will re-review the referral with that in mind and reach out to me once she has.

## 2023-10-03 NOTE — PLAN OF CARE
MOE complete.  Explained to pt pravin Olivier that patient downgrade to observation level of care. Emailed Aisha for ROSA delivery.

## 2023-10-03 NOTE — PT/OT/SLP PROGRESS
Physical Therapy Treatment    Patient Name:  Danielle Loera   MRN:  [id number]    Recommendations:     Discharge Recommendations: nursing facility, skilled  Discharge Equipment Recommendations: other (see comments) (pending progress)  Barriers to discharge:     Assessment:     Danielle Loera is a 89 y.o. female admitted with a medical diagnosis of Frequent falls.  She presents with the following impairments/functional limitations: gait instability, weakness, impaired endurance, impaired balance, impaired self care skills, impaired functional mobility .    Rehab Prognosis: Good; patient would benefit from acute skilled PT services to address these deficits and reach maximum level of function.    Recent Surgery: * No surgery found *      Plan:     During this hospitalization, patient to be seen 5 x/week to address the identified rehab impairments via gait training, therapeutic activities, therapeutic exercises, neuromuscular re-education and progress toward the following goals:    Plan of Care Expires:  10/31/23    Subjective     Chief Complaint: Pt states that she wants to go home.   Patient/Family Comments/goals:   Pain/Comfort:         Objective:     Communicated with NSG prior to session.  Patient found HOB elevated with peripheral IV, bed alarm upon PTA entry to room.     General Precautions: Standard, fall  Orthopedic Precautions: N/A  Braces: N/A  Respiratory Status: Room air    Functional Mobility:  Bed: Vicente supine <-> sit EOB   T/F with RW : Vicente sit <-> stand  Gait with RW: Pt ambulated for approximately 100 ft with Vicente.  Pt demonstrated step through gait pattern with slow renetta and flex posture. VC at times for safety.    Education:  Patient provided with verbal education education regarding safety, PT plan of care, fall prevention.  Additional teaching is warranted.     Patient left up in chair with all lines intact, call button in reach, chair alarm on, and posey vest . NSG aware that pt is [facility].      GOALS:   Multidisciplinary Problems       Physical Therapy Goals          Problem: Physical Therapy    Goal Priority Disciplines Outcome Goal Variances Interventions   Physical Therapy Goal     PT, PT/OT Ongoing, Progressing     Description: Goals to be met by: 10/31/23     Patient will increase functional independence with mobility by performin. Supine to sit with Modified [place]  2. Sit to stand transfer with Modified [place]  3. Gait  x 200 feet with Modified [place] using Rolling Walker.                          Time Tracking:     PT Received On:    PT Start Time: 1332     PT Stop Time: 1359  PT Total Time (min): 27 min     Billable Minutes: Gait Training 15 and Therapeutic Activity 12    Treatment Type: Treatment  PT/PTA: PTA     Number of PTA visits since last PT visit: 1     10/03/2023

## 2023-10-03 NOTE — PT/OT/SLP EVAL
Physical Therapy Evaluation    Patient Name:  Danielle Loera   MRN:  [id number]    Recommendations:     Discharge Recommendations: nursing facility, skilled   Discharge Equipment Recommendations: other (see comments) (pending progress)   Barriers to discharge: Impaired mobility    Assessment:     Danielle Loera is a 89 y.o. female admitted with a medical diagnosis of Frequent falls, admitted with acute encephalopathy, hematoma to posterior head after fall. She presents with the following impairments/functional limitations: weakness, impaired endurance, impaired functional mobility, gait instability, impaired balance, decreased safety awareness. Patient tolerated PT evaluation well, mobilizing with Ynes and RW, cues required for safety and attention to task.     Rehab Prognosis: Good; patient would benefit from acute skilled PT services to address these deficits and reach maximum level of function.    Recent Surgery: * No surgery found *      Plan:     During this hospitalization, patient to be seen 5 x/week to address the identified rehab impairments via gait training, therapeutic activities, therapeutic exercises, neuromuscular re-education and progress toward the following goals:    Plan of Care Expires:  10/31/23    Subjective     Chief Complaint: none stated  Patient/Family Comments/goals: to go home  Pain/Comfort:  Pain Rating 1: 0/10    Patients cultural, spiritual, Latter day conflicts given the current situation: no    Living Environment:  Pt poor historian, PLOF and living environment unknown.  Prior to admission, patients level of function was unknown.  Equipment used at home: other (see comments) (unknown).  DME owned (not currently used): none.  Upon discharge, patient will have assistance from unknown.    Objective:     Communicated with RN prior to session.  Patient found up in chair with peripheral IV  upon PT entry to room.    General Precautions: Standard, fall  Orthopedic Precautions:N/A    Braces: N/A  Respiratory Status: Room air      Exams:  RLE ROM: WNL  RLE Strength: grossly 4/5  LLE ROM: WNL  LLE Strength: grossly 4/5  Skin integrity:  dressing to posterior head intact      Functional Mobility:  Transfers:  Sit to Stand:  minimum assistance with rolling walker  Gait: patient ambulated 80ft with Ynes and RW, shuffle gait, cues for attention to task and safety awareness      AM-PAC 6 CLICK MOBILITY  Total Score:18       Treatment & Education:    Patient provided with verbal education education regarding PT POC.  Understanding was verbalized, however additional teaching warranted.     Patient left HOB elevated with all lines intact, call button in reach, and bed alarm on.    GOALS:   Multidisciplinary Problems       Physical Therapy Goals          Problem: Physical Therapy    Goal Priority Disciplines Outcome Goal Variances Interventions   Physical Therapy Goal     PT, PT/OT Ongoing, Progressing     Description: Goals to be met by: 10/31/23     Patient will increase functional independence with mobility by performin. Supine to sit with Modified [place]  2. Sit to stand transfer with Modified [place]  3. Gait  x 200 feet with Modified [place] using Rolling Walker.                          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    Time Tracking:     PT Received On: 10/02/23  PT Start Time: 1145     PT Stop Time: 1155  PT Total Time (min): 10 min     Billable Minutes: Evaluation Mod complexity      10/03/2023

## 2023-10-03 NOTE — PROGRESS NOTES
[facility] Hospital Medicine Progress Note        Chief Complaint: Inpatient Follow-up for fall    HPI:   Danielle Loera is a 89 y.o. female who PMH HTN, CKD stage III, dementia, anemia;, anxiety;  presents to the ED s/p fall out of bed at home with associated head injury. It was reported per the family pt climbed out of bed and fell hitting the back of her head. Pt was recently admitted to our services last month which she had a fall at that itme. PT went to rehab and was reportedly discharged from rehab the day of arrival in the ED. No reports of LOC, no reports of CP, OSB, N/V/D or fever per family reports.     Initial VS /75 P 76 R 16 T 99 F O2 saturation 98% on room air.  Labs reviewed demonstrated HH 9.2/28.2, BUN/Creat 22.8/1.09. CT of the head without contrast impression reviewed demonstrated no new or worsened intracranial abnormality. Re-demonstrated posterior right parietal scalp hematoma without depressed skull fracture. CT of the cervical spine without contrast no convincing acute cervical spine abnormality. Pt is pleasantly confused and can not provide accurate historical information. Pt received zyprexa and doxycycline in the ED. Family is desiring NH placement. PT is admit to hospital medicine services for further management.     Wound care was continued.  Wound cultures grew staph aureus and doxy was added.      Interval Hx:     Fluctuating blood pressures otherwise hemodynamically stable.  She was alert, comfortably resting at her baseline mental status.  No family member was seen at bedside.  Doxy was added yesterday due to Staph aureus positive wound cultures.  Wound care was continued.  Routine labs from this morning are pending         Objective/physical exam:  Vitals:    10/02/23 1104 10/02/23 1510 10/03/23 0419 10/03/23 0735   BP: 129/82 (!) 149/79 135/82 (!) 157/77   Pulse: 79 63 61 66   Resp: 18 18 17    Temp: 97.8 °F (36.6 °C) 98.1 °F (36.7 °C) 98  °F (36.7 °C) 97.9 °F (36.6 °C)   TempSrc: Oral Oral  Oral   SpO2:   98% 96%   Weight:       Height:         General: In no acute distress, afebrile, lethargic  Skin: Posterior scalp contusion /wound   Respiratory: Clear to auscultation bilaterally  Cardiovascular: S1, S2, no appreciable murmur  Abdomen: Soft, nontender, BS +  MSK: Warm, no lower extremity edema, no clubbing or cyanosis  Neurologic: Alert , confused during conversation, moving all extremities with good strength     Lab Results   Component Value Date     09/30/2023    K 3.3 (L) 09/30/2023    CO2 27 09/30/2023    BUN 19.4 09/30/2023    CREATININE 0.94 09/30/2023    CALCIUM 8.5 09/30/2023    EGFRNONAA 56 06/25/2020      Lab Results   Component Value Date    ALT 9 09/30/2023    AST 12 09/30/2023    ALKPHOS 84 09/30/2023    BILITOT 0.4 09/30/2023      Lab Results   Component Value Date    WBC 3.88 (L) 09/30/2023    WBC 3.88 09/30/2023    HGB 9.9 (L) 09/30/2023    HCT 30.0 (L) 09/30/2023    MCV 93.5 09/30/2023     09/30/2023           Medications:   carvediloL  3.125 mg Oral BID WM    doxycycline  100 mg Oral Q12H    enoxparin  40 mg Subcutaneous Daily    lisinopriL  20 mg Oral Daily    QUEtiapine  25 mg Oral QHS      acetaminophen, aluminum-magnesium hydroxide-simethicone, hydrALAZINE, melatonin, naloxone, ondansetron, polyethylene glycol, prochlorperazine, simethicone     Assessment/Plan:    Acute encephalopathy in the setting of dementia  Recent closed head injury from fall, resulting in hematoma with confusion  MRSA positive scalp wound cultures  Dementia with behavioral disturbances     HX:  Advanced age, HTN, CKD stage 2, ACD    Plan:  -continue doxycycline.  Continue wound care   -neuro checks, seizure precautions, fall precautions   -continue gentle IV hydration.  Encourage p.o. intake.  Monitor for aspiration  -continue, lisinopril and Coreg at current doses   -other home medications were reviewed and renewed.  Continue quetiapine at  nighttime  -needs nursing home placement      Magdalena Eugene MD

## 2023-10-03 NOTE — PROGRESS NOTES
Inpatient Nutrition Assessment    Admit Date: 9/29/2023   Total duration of encounter: 4 days     Nutrition Recommendation/Prescription     Continue current diet as tolerated  Add Boost Plus BID (provides 360 kcal and 14 g protein per container)  Encouraged adequate PO intake  Monitor appetite/PO intake, weight, and labs     Communication of Recommendations: reviewed with nurse and reviewed with patient    Nutrition Assessment     Malnutrition Assessment/Nutrition-Focused Physical Exam    Malnutrition Context: other (see comments) (Does not meet criteria at this time) (10/03/23 1455)  Malnutrition Level: other (see comments) (Does not meet criteria at this time) (10/03/23 1455)  Energy Intake (Malnutrition): other (see comments) (Unable to assess) (10/03/23 1455)  Weight Loss (Malnutrition): greater than 5% in 1 month (10/03/23 1455)  Subcutaneous Fat (Malnutrition): other (see comments) (Does not meet criteria) (10/03/23 1455)           Muscle Mass (Malnutrition): other (see comments) (Does not meet criteria) (10/03/23 1455)                          Fluid Accumulation (Malnutrition): other (see comments) (Does not meet criteria) (10/03/23 1455)        A minimum of two characteristics is recommended for diagnosis of either severe or non-severe malnutrition.    Chart Review    Reason Seen: continuous nutrition monitoring wound    Malnutrition Screening Tool Results   Have you recently lost weight without trying?: No  Have you been eating poorly because of a decreased appetite?: No   MST Score: 0   Diagnosis:  Acute encephalopathy in the setting of dementia  Recent closed head injury from fall, resulting in hematoma with confusion  MRSA positive scalp wound cultures  Dementia with behavioral disturbances    Relevant Medical History:   HTN  CKD stage III  Dementia  Anemia  Vitamin D deficiency    Nutrition-Related Medications:   Scheduled Meds:   carvediloL  3.125 mg Oral BID WM    doxycycline  100 mg Oral Q12H     "enoxparin  40 mg Subcutaneous Daily    lisinopriL  20 mg Oral Daily    QUEtiapine  25 mg Oral QHS     Continuous Infusions:  PRN Meds:.acetaminophen, aluminum-magnesium hydroxide-simethicone, hydrALAZINE, melatonin, naloxone, ondansetron, polyethylene glycol, prochlorperazine, simethicone    Calorie Containing IV Medications: no significant kcals from medications at this time    Nutrition-Related Labs:  10/3/2023: Gluc 94    Diet/PN Order: Diet heart healthy  Oral Supplement Order: none  Tube Feeding Order: none  Appetite/Oral Intake: poor/25-50% of meals  Factors Affecting Nutritional Intake: decreased appetite  Food/Baptism/Cultural Preferences: unable to obtain  Food Allergies: no known food allergies    Wound(s):      Altered Skin Integrity 10/02/23 posterior Head Other (comment)-Tissue loss description: Full thickness noted    Last Bowel Movement: 23    Comments    10/3/2023: Pt unable to answer questions clearly. Per tray observation, pt has <25% PO intake of breakfast. Pt reported not being hungry. Will add Boost Plus BID as preventative MNT. Encouraged adequate PO intake. No reported N/V. Last BM documented as 2023. Per EMR, pt weighed 71.7 kg on 2023 (9.5% wt loss in 1 month, significant). Will monitor.    Anthropometrics    Height: 5' 7" (170.2 cm) Height Method: Stated  Last Weight: 64.9 kg (143 lb) (23) Weight Method: Standard Scale  BMI (Calculated): 22.4  BMI Classification: normal (BMI 18.5-24.9)     Ideal Body Weight (IBW), Female: 135 lb     % Ideal Body Weight, Female (lb): 105.93 %                    Usual Body Weight (UBW), k.7 kg  % Usual Body Weight: 90.66     Usual Weight Provided By: EMR weight history    Wt Readings from Last 5 Encounters:   23 64.9 kg (143 lb)   23 71.7 kg (158 lb)   23 71.7 kg (158 lb)   23 75.3 kg (166 lb)   23 69 kg (152 lb 1.9 oz)     Weight Change(s) Since Admission:  Admit Weight: 64.9 kg (143 lb) " (23)  2023: 64.9 kg    Estimated Needs    Weight Used For Calorie Calculations: 64.9 kg (143 lb 1.3 oz)  Energy Calorie Requirements (kcal): 6697-1776 (25-30 kcal/kg)  Energy Need Method: Kcal/kg  Weight Used For Protein Calculations: 64.9 kg (143 lb 1.3 oz)  Protein Requirements: 65-78 (1.0-1.2 g/kg)  Fluid Requirements (mL): 1622 (1 mL/kcal)  Temp (24hrs), Av °F (36.7 °C), Min:97.9 °F (36.6 °C), Max:98.1 °F (36.7 °C)       Enteral Nutrition    Patient not receiving enteral nutrition at this time.    Parenteral Nutrition    Patient not receiving parenteral nutrition support at this time.    Evaluation of Received Nutrient Intake    Calories: not meeting estimated needs  Protein: not meeting estimated needs    Patient Education    Not applicable.    Nutrition Diagnosis     PES: Inadequate oral intake related to acute illness as evidenced by poor PO intake since admit. (new)    Interventions/Goals     Intervention(s): general/healthful diet and commercial beverage  Goal: Meet greater than 75% of nutritional needs by follow-up. (new)    Monitoring & Evaluation     Dietitian will monitor food and beverage intake, weight, electrolyte/renal panel, glucose/endocrine profile, and gastrointestinal profile.  Nutrition Risk/Follow-Up: moderate (follow-up in 3-5 days)   Please consult if re-assessment needed sooner.

## 2023-10-03 NOTE — PLAN OF CARE
Problem: Physical Therapy  Goal: Physical Therapy Goal  Description: Goals to be met by: 10/31/23     Patient will increase functional independence with mobility by performin. Supine to sit with Modified [place]  2. Sit to stand transfer with Modified [place]  3. Gait  x 200 feet with Modified [place] using Rolling Walker.     Outcome: Ongoing, Progressing

## 2023-10-04 PROCEDURE — 25000003 PHARM REV CODE 250: Performed by: INTERNAL MEDICINE

## 2023-10-04 PROCEDURE — 25000003 PHARM REV CODE 250: Performed by: NURSE PRACTITIONER

## 2023-10-04 PROCEDURE — 11000001 HC ACUTE MED/SURG PRIVATE ROOM

## 2023-10-04 PROCEDURE — 97116 GAIT TRAINING THERAPY: CPT | Mod: CQ

## 2023-10-04 RX ORDER — CLONIDINE HYDROCHLORIDE 0.1 MG/1
0.1 TABLET ORAL 3 TIMES DAILY PRN
Status: DISCONTINUED | OUTPATIENT
Start: 2023-10-04 | End: 2023-10-27 | Stop reason: HOSPADM

## 2023-10-04 RX ADMIN — SENNOSIDES AND DOCUSATE SODIUM 1 TABLET: 50; 8.6 TABLET ORAL at 09:10

## 2023-10-04 RX ADMIN — Medication 6 MG: at 09:10

## 2023-10-04 RX ADMIN — POLYETHYLENE GLYCOL 3350 17 G: 17 POWDER, FOR SOLUTION ORAL at 10:10

## 2023-10-04 RX ADMIN — QUETIAPINE FUMARATE 25 MG: 25 TABLET ORAL at 09:10

## 2023-10-04 RX ADMIN — CLONIDINE HYDROCHLORIDE 0.1 MG: 0.1 TABLET ORAL at 09:10

## 2023-10-04 RX ADMIN — DOXYCYCLINE HYCLATE 100 MG: 100 TABLET, COATED ORAL at 09:10

## 2023-10-04 RX ADMIN — DOXYCYCLINE HYCLATE 100 MG: 100 TABLET, COATED ORAL at 10:10

## 2023-10-04 RX ADMIN — CARVEDILOL 3.12 MG: 3.12 TABLET, FILM COATED ORAL at 10:10

## 2023-10-04 RX ADMIN — CARVEDILOL 3.12 MG: 3.12 TABLET, FILM COATED ORAL at 05:10

## 2023-10-04 RX ADMIN — SENNOSIDES AND DOCUSATE SODIUM 1 TABLET: 50; 8.6 TABLET ORAL at 10:10

## 2023-10-04 RX ADMIN — LISINOPRIL 20 MG: 20 TABLET ORAL at 10:10

## 2023-10-04 NOTE — PT/OT/SLP PROGRESS
Physical Therapy Treatment    Patient Name:  Danielle Loera   MRN:  [id number]    Recommendations:     Discharge Recommendations: nursing facility, skilled  Discharge Equipment Recommendations: other (see comments) (pending progress)  Barriers to discharge: Impaired mobility and Ongoing medical needs    Assessment:     Danielle Loera is a 89 y.o. female admitted with a medical diagnosis of Frequent falls.  She presents with the following impairments/functional limitations: weakness, decreased safety awareness, impaired endurance, impaired cognition, impaired cardiopulmonary response to activity, impaired self care skills, impaired functional mobility, gait instability .    Rehab Prognosis: Good; patient would benefit from acute skilled PT services to address these deficits and reach maximum level of function.    Recent Surgery: * No surgery found *      Plan:     During this hospitalization, patient to be seen 5 x/week to address the identified rehab impairments via gait training, therapeutic activities and progress toward the following goals:    Plan of Care Expires:  10/31/23    Subjective     Chief Complaint:   Patient/Family Comments/goals:   Pain/Comfort:         Objective:     Communicated with nurse prior to session.  Patient found HOB elevated with peripheral IV, bed alarm upon PT entry to room.     General Precautions: Standard, fall  Orthopedic Precautions: N/A  Braces: N/A  Respiratory Status: Room air  Blood Pressure:   Skin Integrity:       Functional Mobility:  Bed Mobility:     Supine to Sit: minimum assistance  Sit to Supine: minimum assistance  Transfers:     Sit to Stand:  contact guard assistance with rolling walker  Gait: pt amb x95ft with RW and CGA with cues for RW manipulation around obstacles        Education:  Patient provided with verbal education education regarding safety awareness.  Understanding was verbalized, however additional teaching warranted.     Patient left HOB elevated  with all lines intact, call button in reach, and bed alarm on..    GOALS:   Multidisciplinary Problems       Physical Therapy Goals          Problem: Physical Therapy    Goal Priority Disciplines Outcome Goal Variances Interventions   Physical Therapy Goal     PT, PT/OT Ongoing, Progressing     Description: Goals to be met by: 10/31/23     Patient will increase functional independence with mobility by performin. Supine to sit with Modified [place]  2. Sit to stand transfer with Modified [place]  3. Gait  x 200 feet with Modified [place] using Rolling Walker.                          Time Tracking:     PT Received On: 10/04/23  PT Start Time: 1512     PT Stop Time: 1531  PT Total Time (min): 19 min     Billable Minutes: Gait Training 19    Treatment Type: Treatment  PT/PTA: PTA     Number of PTA visits since last PT visit: 2     10/04/2023

## 2023-10-04 NOTE — PLAN OF CARE
Spoke to Tootie at [facility] who informed me that she did speak to the patient's son, who also went by the facility this morning. She stated that they were working on the patient's long term eligibility and she would be in touch with me.     Spoke to patient's son, Soy regarding this patient's placement. He stated that because getting her qualified for long term medicaid will take longer than he expected, he is ok with sending a referral for skilled services at [facility] while he continues to work on long term placement at [facility]. He stated that he understands that [facility] in not in network with her [employer] plan, and therefore cannot offer her skilled services while they work on long term placement, and that [facility] is in network and will be able to offer her skilled services.

## 2023-10-04 NOTE — PROGRESS NOTES
[facility] Hospital Medicine Progress Note        Chief Complaint: Inpatient Follow-up for fall    HPI:   Danielle Loera is a 89 y.o. female who PMH HTN, CKD stage III, dementia, anemia;, anxiety;  presents to the ED s/p fall out of bed at home with associated head injury. It was reported per the family pt climbed out of bed and fell hitting the back of her head. Pt was recently admitted to our services last month which she had a fall at that itme. PT went to rehab and was reportedly discharged from rehab the day of arrival in the ED. No reports of LOC, no reports of CP, OSB, N/V/D or fever per family reports.     Initial VS /75 P 76 R 16 T 99 F O2 saturation 98% on room air.  Labs reviewed demonstrated HH 9.2/28.2, BUN/Creat 22.8/1.09. CT of the head without contrast impression reviewed demonstrated no new or worsened intracranial abnormality. Re-demonstrated posterior right parietal scalp hematoma without depressed skull fracture. CT of the cervical spine without contrast no convincing acute cervical spine abnormality. Pt is pleasantly confused and can not provide accurate historical information. Pt received zyprexa and doxycycline in the ED. Family is desiring NH placement. PT is admit to hospital medicine services for further management.     Wound care was continued.  Wound cultures grew staph aureus and doxy was added.      Interval Hx:   No new events overnight.  She was alert, comfortably resting in the bed.  Required extra dose of Seroquel yesterday due to delirium.  This morning she appears comfortably resting.  Wound care was continued.  Routine labs obtained yesterday showed chronic anemia otherwise stable electrolytes             Objective/physical exam:  Vitals:    10/03/23 1548 10/03/23 2000 10/03/23 2333 10/04/23 0425   BP: (!) 151/85 (!) 150/82 134/83 130/65   Pulse: 69 71 69 68   Resp: 19 18     Temp: 97.9 °F (36.6 °C) 98 °F (36.7 °C) 98.3 °F (36.8 °C) 97.7  °F (36.5 °C)   TempSrc: Oral  Oral Oral   SpO2: 95% 95% 97% 99%   Weight:       Height:         General: In no acute distress, afebrile, lethargic  Skin: Posterior scalp contusion /wound   Respiratory: Clear to auscultation bilaterally  Cardiovascular: S1, S2, no appreciable murmur  Abdomen: Soft, nontender, BS +  MSK: Warm, no lower extremity edema, no clubbing or cyanosis  Neurologic: Alert , confused during conversation, moving all extremities with good strength     Lab Results   Component Value Date     10/03/2023    K 3.9 10/03/2023    CO2 30 10/03/2023    BUN 13.7 10/03/2023    CREATININE 0.86 10/03/2023    CALCIUM 8.5 10/03/2023    EGFRNONAA 56 06/25/2020      Lab Results   Component Value Date    ALT 9 09/30/2023    AST 12 09/30/2023    ALKPHOS 84 09/30/2023    BILITOT 0.4 09/30/2023      Lab Results   Component Value Date    WBC 3.20 (L) 10/03/2023    HGB 11.5 (L) 10/03/2023    HCT 35.6 (L) 10/03/2023    MCV 94.2 (H) 10/03/2023     (L) 10/03/2023           Medications:   carvediloL  3.125 mg Oral BID WM    doxycycline  100 mg Oral Q12H    enoxparin  40 mg Subcutaneous Daily    lisinopriL  20 mg Oral Daily    polyethylene glycol  17 g Oral Daily    QUEtiapine  25 mg Oral QHS    senna-docusate 8.6-50 mg  1 tablet Oral BID      acetaminophen, aluminum-magnesium hydroxide-simethicone, bisacodyL, hydrALAZINE, melatonin, naloxone, ondansetron, polyethylene glycol, prochlorperazine, simethicone     Assessment/Plan:    Acute encephalopathy in the setting of dementia  Recent closed head injury from fall, resulting in hematoma with confusion  MRSA positive scalp wound cultures  Dementia with behavioral disturbances , hospital-acquired delirium    HX:  Advanced age, HTN, CKD stage 2, ACD    Plan:  -continue Seroquel at bedtime  -continue doxycycline.  Continue wound care   -neuro checks, seizure precautions, fall precautions   -dc gentle IV hydration.  Encourage p.o. intake.  Monitor for  aspiration  -continue, lisinopril and Coreg at current doses   -other home medications were reviewed and renewed.  -needs nursing home placement      Magdalena Eugene MD

## 2023-10-05 PROCEDURE — 97116 GAIT TRAINING THERAPY: CPT | Mod: CQ

## 2023-10-05 PROCEDURE — 25000003 PHARM REV CODE 250: Performed by: INTERNAL MEDICINE

## 2023-10-05 PROCEDURE — 97535 SELF CARE MNGMENT TRAINING: CPT | Mod: CO

## 2023-10-05 PROCEDURE — 11000001 HC ACUTE MED/SURG PRIVATE ROOM

## 2023-10-05 PROCEDURE — 25000003 PHARM REV CODE 250: Performed by: NURSE PRACTITIONER

## 2023-10-05 PROCEDURE — 63600175 PHARM REV CODE 636 W HCPCS: Performed by: NURSE PRACTITIONER

## 2023-10-05 PROCEDURE — 97530 THERAPEUTIC ACTIVITIES: CPT | Mod: CQ

## 2023-10-05 RX ADMIN — QUETIAPINE FUMARATE 25 MG: 25 TABLET ORAL at 08:10

## 2023-10-05 RX ADMIN — LISINOPRIL 20 MG: 20 TABLET ORAL at 09:10

## 2023-10-05 RX ADMIN — POLYETHYLENE GLYCOL 3350 17 G: 17 POWDER, FOR SOLUTION ORAL at 09:10

## 2023-10-05 RX ADMIN — CARVEDILOL 3.12 MG: 3.12 TABLET, FILM COATED ORAL at 09:10

## 2023-10-05 RX ADMIN — DOXYCYCLINE HYCLATE 100 MG: 100 TABLET, COATED ORAL at 09:10

## 2023-10-05 RX ADMIN — Medication 6 MG: at 08:10

## 2023-10-05 RX ADMIN — SENNOSIDES AND DOCUSATE SODIUM 1 TABLET: 50; 8.6 TABLET ORAL at 09:10

## 2023-10-05 RX ADMIN — DOXYCYCLINE HYCLATE 100 MG: 100 TABLET, COATED ORAL at 08:10

## 2023-10-05 RX ADMIN — SENNOSIDES AND DOCUSATE SODIUM 1 TABLET: 50; 8.6 TABLET ORAL at 08:10

## 2023-10-05 RX ADMIN — CARVEDILOL 3.12 MG: 3.12 TABLET, FILM COATED ORAL at 05:10

## 2023-10-05 RX ADMIN — ENOXAPARIN SODIUM 40 MG: 40 INJECTION SUBCUTANEOUS at 05:10

## 2023-10-05 NOTE — PT/OT/SLP PROGRESS
Occupational Therapy   Treatment    Name: Danielle Loera  MRN: [id number]  Admitting Diagnosis:  Frequent falls       Recommendations:     Discharge Recommendations: nursing facility, skilled  Discharge Equipment Recommendations:  to be determined by next level of care  Barriers to discharge:       Assessment:     Danielle Loera is a 89 y.o. female with a medical diagnosis of Frequent falls.  She presents with decreased cognition. Performance deficits affecting function are weakness, gait instability, decreased lower extremity function, impaired balance, impaired endurance, decreased safety awareness, impaired cognition, impaired self care skills, impaired functional mobility.     Rehab Prognosis:  Good; patient would benefit from acute skilled OT services to address these deficits and reach maximum level of function.       Plan:     Patient to be seen 4 x/week to address the above listed problems via self-care/home management, therapeutic activities, therapeutic exercises  Plan of Care Expires: 11/02/23  Plan of Care Reviewed with: patient    Subjective     Pain/Comfort:  No complaints of pain    Objective:     Communicated with: nurse prior to session.  Patient found HOB elevated with peripheral IV, bed alarm, pulse ox (continuous), telemetry upon OT entry to room.    General Precautions: Standard, fall    Orthopedic Precautions:N/A  Braces: N/A  Respiratory Status: Room air     Occupational Performance:     Bed Mobility:    Supine to sit and back with Min A     Functional Mobility/Transfers:  Sit to stand with Min A with RW for safety, transfer to and from toilet with Min A with RW    Activities of Daily Living:  LB bathing with Max A, toilet hygiene with Min A, standing at sink to wash hands with Min A secondary to difficulty following directions    Therapeutic Positioning    OT interventions performed during the course of today's session in an effort to prevent and/or reduce acquired pressure injuries:    Therapeutic positioning was provided at the conclusion of session to offload all bony prominences for the prevention and/or reduction of pressure injuries    [facility] 6 Click ADL: 13    Patient Education:  Patient provided with verbal education and demonstrations education regarding fall prevention and safety awareness.  Additional teaching is warranted.      Patient left HOB elevated with all lines intact and call button in reach    GOALS:   Multidisciplinary Problems       Occupational Therapy Goals          Problem: Occupational Therapy    Goal Priority Disciplines Outcome Interventions   Occupational Therapy Goal     OT, PT/OT Ongoing, Progressing    Description: Goals to be met by: 11/2/2023    Patient will increase functional independence with ADLs by performing:    UE Dressing with Contact Guard Assistance.  Grooming while seated at sink with Minimal Assistance.  Toileting from toilet with Minimal Assistance for hygiene and clothing management.   Toilet transfer to toilet with Minimal Assistance.                         Time Tracking:     OT Date of Treatment: 10/05/23  OT Start Time: 1425  OT Stop Time: 1449  OT Total Time (min): 24 min    Billable Minutes:Self Care/Home Management 24    OT/JUSTIN: JUSTIN     Number of JUSTIN visits since last OT visit: 1    10/5/2023

## 2023-10-05 NOTE — PROGRESS NOTES
[facility] Hospital Medicine Progress Note        Chief Complaint: Inpatient Follow-up for fall    HPI:   Danielle Loera is a 89 y.o. female who PMH HTN, CKD stage III, dementia, anemia;, anxiety;  presents to the ED s/p fall out of bed at home with associated head injury. It was reported per the family pt climbed out of bed and fell hitting the back of her head. Pt was recently admitted to our services last month which she had a fall at that itme. PT went to rehab and was reportedly discharged from rehab the day of arrival in the ED. No reports of LOC, no reports of CP, OSB, N/V/D or fever per family reports.     Initial VS /75 P 76 R 16 T 99 F O2 saturation 98% on room air.  Labs reviewed demonstrated HH 9.2/28.2, BUN/Creat 22.8/1.09. CT of the head without contrast impression reviewed demonstrated no new or worsened intracranial abnormality. Re-demonstrated posterior right parietal scalp hematoma without depressed skull fracture. CT of the cervical spine without contrast no convincing acute cervical spine abnormality. Pt is pleasantly confused and can not provide accurate historical information. Pt received zyprexa and doxycycline in the ED. Family is desiring NH placement. PT is admit to hospital medicine services for further management.     Wound care was continued.  Wound cultures grew staph aureus and doxy was added.  Seroquel was continued to help with hospital-acquired delirium      Interval Hx:   Baseline mental status.  Comfortably resting.  Has no new complaints or concerns.  Hemodynamically stable.  Currently awaiting placement      Objective/physical exam:  Vitals:    10/04/23 1946 10/05/23 0004 10/05/23 0418 10/05/23 0754   BP: (!) 171/94 104/66 112/66 100/69   Pulse: 71 73 (!) 59 69   Resp:  18     Temp: 98 °F (36.7 °C) 97.4 °F (36.3 °C) 98.1 °F (36.7 °C) 97.7 °F (36.5 °C)   TempSrc: Oral Axillary Oral Oral   SpO2: 97% 97% 99% 99%   Weight:       Height:          General: In no acute distress, afebrile, lethargic  Skin: Posterior scalp contusion /wound   Respiratory: Clear to auscultation bilaterally  Cardiovascular: S1, S2, no appreciable murmur  Abdomen: Soft, nontender, BS +  MSK: Warm, no lower extremity edema, no clubbing or cyanosis  Neurologic: Alert , confused during conversation, moving all extremities with good strength     Lab Results   Component Value Date     10/03/2023    K 3.9 10/03/2023    CO2 30 10/03/2023    BUN 13.7 10/03/2023    CREATININE 0.86 10/03/2023    CALCIUM 8.5 10/03/2023    EGFRNONAA 56 06/25/2020      Lab Results   Component Value Date    ALT 9 09/30/2023    AST 12 09/30/2023    ALKPHOS 84 09/30/2023    BILITOT 0.4 09/30/2023      Lab Results   Component Value Date    WBC 3.20 (L) 10/03/2023    HGB 11.5 (L) 10/03/2023    HCT 35.6 (L) 10/03/2023    MCV 94.2 (H) 10/03/2023     (L) 10/03/2023           Medications:   carvediloL  3.125 mg Oral BID WM    doxycycline  100 mg Oral Q12H    enoxparin  40 mg Subcutaneous Daily    lisinopriL  20 mg Oral Daily    polyethylene glycol  17 g Oral Daily    QUEtiapine  25 mg Oral QHS    senna-docusate 8.6-50 mg  1 tablet Oral BID      acetaminophen, aluminum-magnesium hydroxide-simethicone, bisacodyL, cloNIDine, hydrALAZINE, melatonin, naloxone, ondansetron, polyethylene glycol, prochlorperazine, simethicone     Assessment/Plan:    Acute encephalopathy in the setting of dementia  Recent closed head injury from fall, resulting in hematoma with confusion  MRSA positive scalp wound cultures  Dementia with behavioral disturbances , hospital-acquired delirium    HX:  Advanced age, HTN, CKD stage 2, ACD    Plan:  -continue Seroquel at bedtime  -continue doxycycline.  Continue wound care   -neuro checks, seizure precautions, fall precautions   -dc gentle IV hydration.  Encourage p.o. intake.  Monitor for aspiration  -continue, lisinopril and Coreg at current doses   -other home medications were  reviewed and renewed.  -needs nursing home placement      Magdalena Eugene MD

## 2023-10-05 NOTE — PLAN OF CARE
Spoke to Janeth at [facility]. She stated they would be unable to meet the patient's needs.    MARITZA Mcconnell spoke to the patient's son, Soy who stated that he would like to send a referral for SNF placement to [place] [facility]. Will send via [facility].

## 2023-10-05 NOTE — PT/OT/SLP PROGRESS
Physical Therapy Treatment    Patient Name:  Danielle Loera   MRN:  [id number]    Recommendations:     Discharge Recommendations: nursing facility, skilled  Discharge Equipment Recommendations: other (see comments) (pending progress)  Barriers to discharge: Decreased caregiver support    Assessment:     Danielle Loera is a 89 y.o. female admitted with a medical diagnosis of Frequent falls.  She presents with the following impairments/functional limitations: weakness, impaired functional mobility, impaired cognition, decreased safety awareness, impaired cardiopulmonary response to activity, impaired endurance, gait instability, impaired self care skills .    Rehab Prognosis: Good; patient would benefit from acute skilled PT services to address these deficits and reach maximum level of function.    Recent Surgery: * No surgery found *      Plan:     During this hospitalization, patient to be seen 5 x/week to address the identified rehab impairments via gait training, therapeutic activities and progress toward the following goals:    Plan of Care Expires:  10/31/23    Subjective     Chief Complaint:   Patient/Family Comments/goals:   Pain/Comfort:         Objective:     Communicated with nurse prior to session.  Patient found HOB elevated with peripheral IV, bed alarm, pulse ox (continuous), telemetry upon PT entry to room.     General Precautions: Standard, fall  Orthopedic Precautions: N/A  Braces: N/A  Respiratory Status: Room air  Blood Pressure:   Skin Integrity:       Functional Mobility:  Bed Mobility:     Supine to Sit: minimum assistance  Sit to Supine: minimum assistance  Transfers:     Sit to Stand:  contact guard assistance with rolling walker  Gait: pt amb x2 trials for 20ft and 95ft with RW and Ynes for RW manipulation and redirection.         Education:  Patient provided with verbal education education regarding safety awareness.  Understanding was verbalized, however additional teaching warranted.      Patient left HOB elevated with all lines intact, call button in reach, and bed alarm on..    GOALS:   Multidisciplinary Problems       Physical Therapy Goals          Problem: Physical Therapy    Goal Priority Disciplines Outcome Goal Variances Interventions   Physical Therapy Goal     PT, PT/OT Ongoing, Progressing     Description: Goals to be met by: 10/31/23     Patient will increase functional independence with mobility by performin. Supine to sit with Modified [place]  2. Sit to stand transfer with Modified [place]  3. Gait  x 200 feet with Modified [place] using Rolling Walker.                          Time Tracking:     PT Received On: 10/05/23  PT Start Time: 1425     PT Stop Time: 1450  PT Total Time (min): 25 min     Billable Minutes: Gait Training 15 and Therapeutic Activity 10    Treatment Type: Treatment  PT/PTA: PTA     Number of PTA visits since last PT visit: 3     10/05/2023

## 2023-10-06 LAB
FERRITIN SERPL-MCNC: 657.35 NG/ML (ref 4.63–204)
IRON SATN MFR SERPL: 36 % (ref 20–50)
IRON SERPL-MCNC: 51 UG/DL (ref 50–170)
TIBC SERPL-MCNC: 140 UG/DL (ref 250–450)
TIBC SERPL-MCNC: 89 UG/DL (ref 70–310)
TRANSFERRIN SERPL-MCNC: 108 MG/DL
VIT B12 SERPL-MCNC: <148 PG/ML (ref 213–816)

## 2023-10-06 PROCEDURE — 25000003 PHARM REV CODE 250: Performed by: NURSE PRACTITIONER

## 2023-10-06 PROCEDURE — 25000003 PHARM REV CODE 250: Performed by: INTERNAL MEDICINE

## 2023-10-06 PROCEDURE — 82728 ASSAY OF FERRITIN: CPT | Performed by: INTERNAL MEDICINE

## 2023-10-06 PROCEDURE — 97116 GAIT TRAINING THERAPY: CPT | Mod: CQ

## 2023-10-06 PROCEDURE — 63600175 PHARM REV CODE 636 W HCPCS: Performed by: NURSE PRACTITIONER

## 2023-10-06 PROCEDURE — 11000001 HC ACUTE MED/SURG PRIVATE ROOM

## 2023-10-06 PROCEDURE — 83550 IRON BINDING TEST: CPT | Performed by: INTERNAL MEDICINE

## 2023-10-06 PROCEDURE — 82607 VITAMIN B-12: CPT | Performed by: INTERNAL MEDICINE

## 2023-10-06 RX ORDER — DONEPEZIL HYDROCHLORIDE 5 MG/1
5 TABLET, FILM COATED ORAL NIGHTLY
Status: DISCONTINUED | OUTPATIENT
Start: 2023-10-06 | End: 2023-10-22

## 2023-10-06 RX ORDER — DOXYCYCLINE HYCLATE 100 MG
100 TABLET ORAL EVERY 12 HOURS
Status: COMPLETED | OUTPATIENT
Start: 2023-10-06 | End: 2023-10-08

## 2023-10-06 RX ADMIN — LISINOPRIL 20 MG: 20 TABLET ORAL at 08:10

## 2023-10-06 RX ADMIN — DONEPEZIL HYDROCHLORIDE 5 MG: 5 TABLET, FILM COATED ORAL at 08:10

## 2023-10-06 RX ADMIN — DOXYCYCLINE HYCLATE 100 MG: 100 TABLET, COATED ORAL at 08:10

## 2023-10-06 RX ADMIN — SENNOSIDES AND DOCUSATE SODIUM 1 TABLET: 50; 8.6 TABLET ORAL at 08:10

## 2023-10-06 RX ADMIN — SACUBITRIL AND VALSARTAN 1 TABLET: 24; 26 TABLET, FILM COATED ORAL at 09:10

## 2023-10-06 RX ADMIN — CARVEDILOL 3.12 MG: 3.12 TABLET, FILM COATED ORAL at 08:10

## 2023-10-06 RX ADMIN — CARVEDILOL 3.12 MG: 3.12 TABLET, FILM COATED ORAL at 04:10

## 2023-10-06 RX ADMIN — POLYETHYLENE GLYCOL 3350 17 G: 17 POWDER, FOR SOLUTION ORAL at 08:10

## 2023-10-06 RX ADMIN — ENOXAPARIN SODIUM 40 MG: 40 INJECTION SUBCUTANEOUS at 04:10

## 2023-10-06 RX ADMIN — QUETIAPINE FUMARATE 25 MG: 25 TABLET ORAL at 08:10

## 2023-10-06 NOTE — PROGRESS NOTES
Inpatient Nutrition Assessment    Admit Date: 9/29/2023   Total duration of encounter: 7 days     Nutrition Recommendation/Prescription     Continue current diet as tolerated  Continue Boost Plus BID (provides 360 kcal and 14 g protein per container)  Encouraged adequate PO intake  Monitor appetite/PO intake, weight, and labs     Communication of Recommendations: reviewed with nurse    Nutrition Assessment     Malnutrition Assessment/Nutrition-Focused Physical Exam    Malnutrition Context: other (see comments) (Does not meet criteria at this time) (10/03/23 1455)  Malnutrition Level: other (see comments) (Does not meet criteria at this time) (10/03/23 1455)  Energy Intake (Malnutrition): other (see comments) (Unable to assess) (10/03/23 1455)  Weight Loss (Malnutrition): greater than 5% in 1 month (10/03/23 1455)  Subcutaneous Fat (Malnutrition): other (see comments) (Does not meet criteria) (10/03/23 1455)           Muscle Mass (Malnutrition): other (see comments) (Does not meet criteria) (10/03/23 1455)                          Fluid Accumulation (Malnutrition): other (see comments) (Does not meet criteria) (10/03/23 1455)        A minimum of two characteristics is recommended for diagnosis of either severe or non-severe malnutrition.    Chart Review    Reason Seen: continuous nutrition monitoring and follow-up wound    Malnutrition Screening Tool Results   Have you recently lost weight without trying?: No  Have you been eating poorly because of a decreased appetite?: No   MST Score: 0   Diagnosis:  Acute encephalopathy in the setting of dementia  Recent closed head injury from fall, resulting in hematoma with confusion  MRSA positive scalp wound cultures  Dementia with behavioral disturbances    Relevant Medical History:   HTN  CKD stage III  Dementia  Anemia  Vitamin D deficiency    Nutrition-Related Medications:   Scheduled Meds:   carvediloL  3.125 mg Oral BID WM    doxycycline  100 mg Oral Q12H    enoxparin   "40 mg Subcutaneous Daily    lisinopriL  20 mg Oral Daily    polyethylene glycol  17 g Oral Daily    QUEtiapine  25 mg Oral QHS    senna-docusate 8.6-50 mg  1 tablet Oral BID     Continuous Infusions:  PRN Meds:.acetaminophen, aluminum-magnesium hydroxide-simethicone, bisacodyL, cloNIDine, hydrALAZINE, melatonin, naloxone, ondansetron, polyethylene glycol, prochlorperazine, simethicone    Calorie Containing IV Medications: no significant kcals from medications at this time    Nutrition-Related Labs:  10/3/2023: Gluc 94  10/6/2023: no new labs    Diet/PN Order: Diet heart healthy  Oral Supplement Order: none  Tube Feeding Order: none  Appetite/Oral Intake: poor/0-25% of meals  Factors Affecting Nutritional Intake: decreased appetite  Food/Christian/Cultural Preferences: unable to obtain  Food Allergies: no known food allergies    Wound(s):      Altered Skin Integrity 10/02/23 posterior Head Other (comment)-Tissue loss description: Full thickness noted    Last Bowel Movement: 10/06/23    Comments    10/3/2023: Pt unable to answer questions clearly. Per tray observation, pt has <25% PO intake of breakfast. Pt reported not being hungry. Will add Boost Plus BID as preventative MNT. Encouraged adequate PO intake. No reported N/V. Last BM documented as 2023. Per EMR, pt weighed 71.7 kg on 2023 (9.5% wt loss in 1 month, significant). Will monitor.    10/6/2023: Spoke with nurse.The nurse reports pt refused breakfast, presumed poor appetite. The nurse denies N/V/D/C. Last BM noted. Boost Plus ordered. Encouraged adequate PO intake.    Anthropometrics    Height: 5' 7" (170.2 cm) Height Method: Stated  Last Weight: 64.9 kg (143 lb) (23) Weight Method: Standard Scale  BMI (Calculated): 22.4  BMI Classification: normal (BMI 18.5-24.9)     Ideal Body Weight (IBW), Female: 135 lb     % Ideal Body Weight, Female (lb): 105.93 %                    Usual Body Weight (UBW), k.7 kg  % Usual Body Weight: 90.66   "   Usual Weight Provided By: EMR weight history    Wt Readings from Last 5 Encounters:   23 64.9 kg (143 lb)   23 71.7 kg (158 lb)   23 71.7 kg (158 lb)   23 75.3 kg (166 lb)   23 69 kg (152 lb 1.9 oz)     Weight Change(s) Since Admission:  Admit Weight: 64.9 kg (143 lb) (23)  2023: 64.9 kg  10/6/2023: no new wts    Estimated Needs    Weight Used For Calorie Calculations: 64.9 kg (143 lb 1.3 oz)  Energy Calorie Requirements (kcal): 3578-0950 (25-30 kcal/kg)  Energy Need Method: Kcal/kg  Weight Used For Protein Calculations: 64.9 kg (143 lb 1.3 oz)  Protein Requirements: 65-78 (1.0-1.2 g/kg)  Fluid Requirements (mL): 1622 (1 mL/kcal)  Temp (24hrs), Av.8 °F (36.6 °C), Min:97.7 °F (36.5 °C), Max:97.9 °F (36.6 °C)       Enteral Nutrition    Patient not receiving enteral nutrition at this time.    Parenteral Nutrition    Patient not receiving parenteral nutrition support at this time.    Evaluation of Received Nutrient Intake    Calories: not meeting estimated needs  Protein: not meeting estimated needs    Patient Education    Not applicable.    Nutrition Diagnosis     PES: Inadequate oral intake related to acute illness as evidenced by poor PO intake since admit. (continues)    Interventions/Goals     Intervention(s): general/healthful diet and commercial beverage  Goal: Meet greater than 75% of nutritional needs by follow-up. (goal progressing)    Monitoring & Evaluation     Dietitian will monitor food and beverage intake, weight, electrolyte/renal panel, glucose/endocrine profile, and gastrointestinal profile.  Nutrition Risk/Follow-Up: moderate (follow-up in 3-5 days)   Please consult if re-assessment needed sooner.

## 2023-10-06 NOTE — PT/OT/SLP PROGRESS
Physical Therapy Treatment    Patient Name:  Danielle Loera   MRN:  [id number]    Recommendations:     Discharge Recommendations: nursing facility, skilled  Discharge Equipment Recommendations: to be determined by next level of care  Barriers to discharge:    Assessment:     Danielle Loera is a 89 y.o. female admitted with a medical diagnosis of Frequent falls.  She presents with the following impairments/functional limitations: weakness, gait instability, impaired endurance, impaired balance, impaired self care skills, impaired functional mobility.    Rehab Prognosis: Good; patient would benefit from acute skilled PT services to address these deficits and reach maximum level of function.    Recent Surgery: * No surgery found *      Plan:     During this hospitalization, patient to be seen 5 x/week to address the identified rehab impairments via gait training, therapeutic activities, therapeutic exercises and progress toward the following goals:    Plan of Care Expires:  10/31/23    Subjective     Chief Complaint: Pt shook her head yes to get oob and ambulate in [place].   Patient/Family Comments/goals:   Pain/Comfort:         Objective:     Communicated with NSG prior to session.  Patient found HOB elevated with peripheral IV, telemetry, bed alarm, pulse ox (continuous) upon PT entry to room.     General Precautions: Standard, fall  Orthopedic Precautions: N/A  Braces: N/A  Respiratory Status: Room air    Functional Mobility:  Bed: Vicente supine <-> sit EOB, roll L and R   T/F with RW: Vicente sit <-> stand   Gait with RW: Pt ambulated for 100 ft CGA. Pt easily distracted requiring VC to avoid bumping into objects with RW.     Education:  Patient provided with verbal education  regarding safety and fall prevention.  Additional teaching is warranted.     Patient left HOB elevated with all lines intact, call button in reach, bed alarm on, and CNA present..    GOALS:   Multidisciplinary Problems       Physical Therapy  Goals          Problem: Physical Therapy    Goal Priority Disciplines Outcome Goal Variances Interventions   Physical Therapy Goal     PT, PT/OT Ongoing, Progressing     Description: Goals to be met by: 10/31/23     Patient will increase functional independence with mobility by performin. Supine to sit with Modified [place]  2. Sit to stand transfer with Modified [place]  3. Gait  x 200 feet with Modified [place] using Rolling Walker.                          Time Tracking:     PT Received On:    PT Start Time: 1152     PT Stop Time: 1209  PT Total Time (min): 17 min     Billable Minutes: Gait Training 17    Treatment Type: Treatment  PT/PTA: PTA     Number of PTA visits since last PT visit: 4     10/06/2023

## 2023-10-06 NOTE — PROGRESS NOTES
[facility] Medicine Progress Note        Chief Complaint: Inpatient Follow-up for fall    HPI:     Danielle Loera is a 89 y.o. female with  PMHx of  HTN, CKD stage III, dementia, anemia;, anxiety;  presents to the ED s/p fall out of bed at home with associated head injury. It was reported per the family pt climbed out of bed and fell hitting the back of her head. Pt was recently admitted to our services last month when she had a fall at that itme and discharged to  rehab and was reportedly discharged from rehab the day of arrival in the ED. No reports of LOC, no reports of CP, SOB, N/V/D or fever per family reports.      Initial VS /75 P 76 R 16 T 99 F O2 saturation 98% on room air.  Labs reviewed demonstrated HH 9.2/28.2, BUN/Creat 22.8/1.09. CT of the head without contrast showed no new or worsened intracranial abnormality. Re-demonstrated posterior right parietal scalp hematoma without depressed skull fracture. CT of the cervical spine without contrast no convincing acute cervical spine abnormality. Pt is pleasantly confused and can not provide accurate historical information. Pt received zyprexa and doxycycline in the ED. Family is desiring NH placement. Pt is admit to hospital medicine services for further management.      Pt is noted to have serosanguinous discharge form altered skin integrity located occipital scalp. Wound care was continued and culture obtained.  Wound cultures grew MRSA and doxy was added.  Seroquel was continued to help with hospital-acquired delirium. Home meds are reviewed and resumed as appropriate.       Interval Hx:   Pt is awake, oriented to self only . Disoriented to situation.   Afebrile. On RA. BP noted to be mod elevated.   Home BP meds are reviewed and will resumed Entresto.   No AM labs today  to review     Case was discussed with patient's nurse and  on the floor.    Objective/physical exam:  General: In no acute  distress, afebrile  Chest: Clear to auscultation bilaterally  Heart: RRR, +S1, S2, no appreciable murmur  Abdomen: Soft, nontender, BS +  MSK: Warm, no lower extremity edema, no clubbing or cyanosis  Neurologic: Alert and oriented to self only, Moves all ext spontaneously in the bed.      VITAL SIGNS: 24 HRS MIN & MAX LAST   Temp  Min: 97.7 °F (36.5 °C)  Max: 98.1 °F (36.7 °C) 98.1 °F (36.7 °C)   BP  Min: 118/72  Max: 156/78 (!) 156/78   Pulse  Min: 65  Max: 71  71   Resp  Min: 18  Max: 18 18   SpO2  Min: 93 %  Max: 99 % 99 %     I have reviewed the following labs:  Recent Labs   Lab 09/29/23  2143 09/30/23  0344 10/03/23  1106   WBC 4.10* 3.88  3.88* 3.20*   RBC 3.02* 3.21* 3.78*   HGB 9.2* 9.9* 11.5*   HCT 28.2* 30.0* 35.6*   MCV 93.4 93.5 94.2*   MCH 30.5 30.8 30.4   MCHC 32.6* 33.0 32.3*   RDW 14.5 14.4 14.8    153 115*   MPV 11.3* 11.4* 11.7*     Recent Labs   Lab 09/29/23 2143 09/30/23 0344 10/03/23  1106    145 140   K 3.7 3.3* 3.9   CO2 28 27 30   BUN 22.8* 19.4 13.7   CREATININE 1.09* 0.94 0.86   CALCIUM 8.6 8.5 8.5   MG  --  2.10 2.00   ALBUMIN 2.8* 2.7*  --    ALKPHOS 90 84  --    ALT 9 9  --    AST 13 12  --    BILITOT 0.3 0.4  --      Microbiology Results (last 7 days)       Procedure Component Value Units Date/Time    Wound Culture [[id number]]  (Abnormal)  (Susceptibility) Collected: 09/29/23 2205    Order Status: Completed Specimen: Abscess from Scalp Updated: 10/02/23 0949     Wound Culture Many Methicillin resistant Staphylococcus aureus             See below for Radiology    Scheduled Med:   carvediloL  3.125 mg Oral BID WM    doxycycline  100 mg Oral Q12H    enoxparin  40 mg Subcutaneous Daily    lisinopriL  20 mg Oral Daily    polyethylene glycol  17 g Oral Daily    QUEtiapine  25 mg Oral QHS    senna-docusate 8.6-50 mg  1 tablet Oral BID      Continuous Infusions:     PRN Meds:  acetaminophen, aluminum-magnesium hydroxide-simethicone, bisacodyL, cloNIDine, hydrALAZINE,  melatonin, naloxone, ondansetron, polyethylene glycol, prochlorperazine, simethicone     Assessment/Plan:  Recurrent Fall at home  Recent closed head injury from fall, resulting in hematoma, posterior right parietal scalp hematoma  Infected Scalp wound -Culture positive for MRSA  Dementia with behavioral disturbances   Chronic Combined systolic and diastolic HF with LVEF 4-45%, currently compensated   Advanced age   Age related physical Debility   Failure to thrive      Hx of Essential HTN, CKD stage III,      Plan:  Continue PT/OT service and supportive care   PT suggested SNF placement   CM consulted to assist with DC planning     Occipital scalp wound grew MRSA sensitive to doxycycline.  Continue neuro checks, seizure precautions, fall precautions   Continue quetiapine at nighttime  Home meds are reviewed and resumed as appropriate       VTE prophylaxis: Lovenox    Patient condition:  Fair     Anticipated discharge and Disposition:     SNF placement       All diagnosis and differential diagnosis have been reviewed; assessment and plan has been documented; I have personally reviewed the labs and test results that are presently available; I have reviewed the patients medication list; I have reviewed the consulting providers response and recommendations. I have reviewed or attempted to review medical records based upon their availability    All of the patient's questions have been  addressed and answered. Patient's is agreeable to the above stated plan. I will continue to monitor closely and make adjustments to medical management as needed.    Timur Hartmann MD   10/06/2023

## 2023-10-06 NOTE — PLAN OF CARE
Problem: Adult Inpatient Plan of Care  Goal: Plan of Care Review  10/6/2023 1150 by Amelia Pablo LPN  Outcome: Ongoing, Progressing  10/6/2023 1147 by Amelia Pablo LPN  Outcome: Ongoing, Progressing  Goal: Patient-Specific Goal (Individualized)  10/6/2023 1150 by Amelia Pablo LPN  Outcome: Ongoing, Progressing  10/6/2023 1147 by Amelia Pablo LPN  Outcome: Ongoing, Progressing  Goal: Absence of Hospital-Acquired Illness or Injury  10/6/2023 1150 by Amelia Pablo LPN  Outcome: Ongoing, Progressing  10/6/2023 1147 by Amelia Pablo LPN  Outcome: Ongoing, Progressing  Goal: Optimal Comfort and Wellbeing  10/6/2023 1150 by Amelia Pablo LPN  Outcome: Ongoing, Progressing  10/6/2023 1147 by Amelia Pablo LPN  Outcome: Ongoing, Progressing  Goal: Readiness for Transition of Care  10/6/2023 1150 by Amelia Pablo LPN  Outcome: Ongoing, Progressing  10/6/2023 1147 by Amelia Pablo LPN  Outcome: Ongoing, Progressing     Problem: Skin Injury Risk Increased  Goal: Skin Health and Integrity  10/6/2023 1150 by Amelia Pablo LPN  Outcome: Ongoing, Progressing  10/6/2023 1147 by Amelia Pablo LPN  Outcome: Ongoing, Progressing     Problem: Fall Injury Risk  Goal: Absence of Fall and Fall-Related Injury  10/6/2023 1150 by Amelia Pablo LPN  Outcome: Ongoing, Progressing  10/6/2023 1147 by Amelia Pablo LPN  Outcome: Ongoing, Progressing     Problem: Impaired Wound Healing  Goal: Optimal Wound Healing  10/6/2023 1150 by Amelia Pablo LPN  Outcome: Ongoing, Progressing  10/6/2023 1147 by Amelia Pablo LPN  Outcome: Ongoing, Progressing

## 2023-10-06 NOTE — PLAN OF CARE
[facility] is unable to meet this patient's needs for SNF placement. Waiting for direction from patient's son, Soy, on where he would like for us to send the referral next. He has been provided with a list of facilities in network with her insurance provider.    Sent referral to [facility] per Soy's request.

## 2023-10-07 LAB
ALBUMIN SERPL-MCNC: 2.6 G/DL (ref 3.4–4.8)
ALBUMIN/GLOB SERPL: 0.7 RATIO (ref 1.1–2)
ALP SERPL-CCNC: 75 UNIT/L (ref 40–150)
ALT SERPL-CCNC: 8 UNIT/L (ref 0–55)
AST SERPL-CCNC: 26 UNIT/L (ref 5–34)
BILIRUB SERPL-MCNC: 0.3 MG/DL
BUN SERPL-MCNC: 16.3 MG/DL (ref 9.8–20.1)
CALCIUM SERPL-MCNC: 8 MG/DL (ref 8.4–10.2)
CHLORIDE SERPL-SCNC: 107 MMOL/L (ref 98–111)
CO2 SERPL-SCNC: 24 MMOL/L (ref 23–31)
CREAT SERPL-MCNC: 0.92 MG/DL (ref 0.55–1.02)
GFR SERPLBLD CREATININE-BSD FMLA CKD-EPI: 58 MLS/MIN/1.73/M2
GLOBULIN SER-MCNC: 3.8 GM/DL (ref 2.4–3.5)
GLUCOSE SERPL-MCNC: 72 MG/DL (ref 75–121)
POTASSIUM SERPL-SCNC: 5 MMOL/L (ref 3.5–5.1)
PROT SERPL-MCNC: 6.4 GM/DL (ref 5.8–7.6)
SODIUM SERPL-SCNC: 137 MMOL/L (ref 132–146)

## 2023-10-07 PROCEDURE — 25000003 PHARM REV CODE 250: Performed by: INTERNAL MEDICINE

## 2023-10-07 PROCEDURE — 25000003 PHARM REV CODE 250: Performed by: NURSE PRACTITIONER

## 2023-10-07 PROCEDURE — 63600175 PHARM REV CODE 636 W HCPCS: Performed by: NURSE PRACTITIONER

## 2023-10-07 PROCEDURE — 63600175 PHARM REV CODE 636 W HCPCS: Performed by: INTERNAL MEDICINE

## 2023-10-07 PROCEDURE — 80053 COMPREHEN METABOLIC PANEL: CPT | Performed by: INTERNAL MEDICINE

## 2023-10-07 PROCEDURE — 11000001 HC ACUTE MED/SURG PRIVATE ROOM

## 2023-10-07 RX ORDER — RISPERIDONE 0.5 MG/1
0.5 TABLET ORAL DAILY
Status: DISCONTINUED | OUTPATIENT
Start: 2023-10-07 | End: 2023-10-08

## 2023-10-07 RX ORDER — CYANOCOBALAMIN 1000 UG/ML
1000 INJECTION, SOLUTION INTRAMUSCULAR; SUBCUTANEOUS DAILY
Status: COMPLETED | OUTPATIENT
Start: 2023-10-07 | End: 2023-10-10

## 2023-10-07 RX ORDER — RISPERIDONE 1 MG/1
1 TABLET ORAL
Status: DISCONTINUED | OUTPATIENT
Start: 2023-10-07 | End: 2023-10-20

## 2023-10-07 RX ADMIN — CARVEDILOL 3.12 MG: 3.12 TABLET, FILM COATED ORAL at 05:10

## 2023-10-07 RX ADMIN — DOXYCYCLINE HYCLATE 100 MG: 100 TABLET, COATED ORAL at 08:10

## 2023-10-07 RX ADMIN — RISPERIDONE 0.5 MG: 0.5 TABLET ORAL at 03:10

## 2023-10-07 RX ADMIN — SACUBITRIL AND VALSARTAN 1 TABLET: 24; 26 TABLET, FILM COATED ORAL at 08:10

## 2023-10-07 RX ADMIN — CARVEDILOL 3.12 MG: 3.12 TABLET, FILM COATED ORAL at 08:10

## 2023-10-07 RX ADMIN — Medication 1 TABLET: at 08:10

## 2023-10-07 RX ADMIN — ENOXAPARIN SODIUM 40 MG: 40 INJECTION SUBCUTANEOUS at 05:10

## 2023-10-07 RX ADMIN — Medication 6 MG: at 08:10

## 2023-10-07 RX ADMIN — POLYETHYLENE GLYCOL 3350 17 G: 17 POWDER, FOR SOLUTION ORAL at 08:10

## 2023-10-07 RX ADMIN — DONEPEZIL HYDROCHLORIDE 5 MG: 5 TABLET, FILM COATED ORAL at 08:10

## 2023-10-07 RX ADMIN — SENNOSIDES AND DOCUSATE SODIUM 1 TABLET: 50; 8.6 TABLET ORAL at 08:10

## 2023-10-07 RX ADMIN — RISPERIDONE 1 MG: 1 TABLET ORAL at 08:10

## 2023-10-07 RX ADMIN — CYANOCOBALAMIN 1000 MCG: 1000 INJECTION, SOLUTION INTRAMUSCULAR; SUBCUTANEOUS at 08:10

## 2023-10-07 NOTE — PROGRESS NOTES
[facility] Medicine Progress Note        Chief Complaint: Inpatient Follow-up for fall    HPI:   Danielle Loera is a 89 y.o. female with  PMHx of  HTN, CKD stage III, dementia, anemia;, anxiety;  presents to the ED s/p fall out of bed at home with associated head injury. It was reported per the family pt climbed out of bed and fell hitting the back of her head. Pt was recently admitted to our services last month when she had a fall at that itme and discharged to  rehab and was reportedly discharged from rehab the day of arrival in the ED. No reports of LOC, no reports of CP, SOB, N/V/D or fever per family reports.      Initial VS /75 P 76 R 16 T 99 F O2 saturation 98% on room air.  Labs reviewed demonstrated HH 9.2/28.2, BUN/Creat 22.8/1.09. CT of the head without contrast showed no new or worsened intracranial abnormality. Re-demonstrated posterior right parietal scalp hematoma without depressed skull fracture. CT of the cervical spine without contrast no convincing acute cervical spine abnormality. Pt is pleasantly confused and can not provide accurate historical information. Pt received zyprexa and doxycycline in the ED. Family is desiring NH placement. Pt is admit to hospital medicine services for further management.      Pt is noted to have serosanguinous discharge form altered skin integrity located occipital scalp. Wound care was continued and culture obtained.  Wound cultures grew MRSA and doxy was added.  Seroquel was continued to help with hospital-acquired delirium. Home meds are reviewed and resumed as appropriate.       Interval Hx:   Awake , oriented to self only.   Screams intermittently   Says wants to go home.     Case was discussed with patient's nurse and  on the floor.    Objective/physical exam:  General: In no acute distress, afebrile  Chest: Clear to auscultation bilaterally  Heart: RRR, +S1, S2, no appreciable murmur  Abdomen:  Soft, nontender, BS +  MSK: Warm, no lower extremity edema, no clubbing or cyanosis  Neurologic: Alert and oriented to self only, Moves all ext spontaneously in the bed.      VITAL SIGNS: 24 HRS MIN & MAX LAST   Temp  Min: 97.8 °F (36.6 °C)  Max: 98.4 °F (36.9 °C) 98.4 °F (36.9 °C)   BP  Min: 128/61  Max: 161/84 (!) 143/82   Pulse  Min: 65  Max: 72  72   Resp  Min: 18  Max: 18 18   SpO2  Min: 91 %  Max: 96 % (!) 91 %     I have reviewed the following labs:  Recent Labs   Lab 10/03/23  1106   WBC 3.20*   RBC 3.78*   HGB 11.5*   HCT 35.6*   MCV 94.2*   MCH 30.4   MCHC 32.3*   RDW 14.8   *   MPV 11.7*     Recent Labs   Lab 10/03/23  1106 10/07/23  0344    137   K 3.9 5.0   CO2 30 24   BUN 13.7 16.3   CREATININE 0.86 0.92   CALCIUM 8.5 8.0*   MG 2.00  --    ALBUMIN  --  2.6*   ALKPHOS  --  75   ALT  --  8   AST  --  26   BILITOT  --  0.3     Microbiology Results (last 7 days)       Procedure Component Value Units Date/Time    Wound Culture [[id number]]  (Abnormal)  (Susceptibility) Collected: 09/29/23 220    Order Status: Completed Specimen: Abscess from Scalp Updated: 10/02/23 0915     Wound Culture Many Methicillin resistant Staphylococcus aureus             See below for Radiology    Scheduled Med:   B12-levomefolate calcium-B6  1 tablet Oral Daily    carvediloL  3.125 mg Oral BID WM    cyanocobalamin  1,000 mcg Intramuscular Daily    donepeziL  5 mg Oral QHS    doxycycline  100 mg Oral Q12H    enoxparin  40 mg Subcutaneous Daily    polyethylene glycol  17 g Oral Daily    risperiDONE  0.5 mg Oral Daily    risperiDONE  1 mg Oral After dinner    sacubitriL-valsartan  1 tablet Oral BID    senna-docusate 8.6-50 mg  1 tablet Oral BID      Continuous Infusions:     PRN Meds:  acetaminophen, aluminum-magnesium hydroxide-simethicone, bisacodyL, cloNIDine, hydrALAZINE, melatonin, naloxone, ondansetron, polyethylene glycol, prochlorperazine, simethicone     Assessment/Plan:  Recurrent Fall at home  Recent closed  head injury from fall, resulting in hematoma, posterior right parietal scalp hematoma  Infected Scalp wound -Culture positive for MRSA  Dementia with behavioral disturbances   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  Continue neuro checks, seizure precautions, fall precautions   Seroquel changed to Risperidone 0.5 mg in am 1 mg in pm   Home meds are reviewed and resumed as appropriate     VTE prophylaxis: Lovenox    Patient condition:  Fair     Anticipated discharge and Disposition:     SNF placement     All diagnosis and differential diagnosis have been reviewed; assessment and plan has been documented; I have personally reviewed the labs and test results that are presently available; I have reviewed the patients medication list; I have reviewed the consulting providers response and recommendations. I have reviewed or attempted to review medical records based upon their availability    All of the patient's questions have been  addressed and answered. Patient's is agreeable to the above stated plan. I will continue to monitor closely and make adjustments to medical management as needed.      Timur Hartmann MD   10/07/2023

## 2023-10-08 PROCEDURE — 25000003 PHARM REV CODE 250: Performed by: NURSE PRACTITIONER

## 2023-10-08 PROCEDURE — 25000003 PHARM REV CODE 250: Performed by: INTERNAL MEDICINE

## 2023-10-08 PROCEDURE — 63600175 PHARM REV CODE 636 W HCPCS: Performed by: NURSE PRACTITIONER

## 2023-10-08 PROCEDURE — 11000001 HC ACUTE MED/SURG PRIVATE ROOM

## 2023-10-08 PROCEDURE — 63600175 PHARM REV CODE 636 W HCPCS: Performed by: INTERNAL MEDICINE

## 2023-10-08 RX ORDER — TAMSULOSIN HYDROCHLORIDE 0.4 MG/1
0.4 CAPSULE ORAL DAILY
Status: DISCONTINUED | OUTPATIENT
Start: 2023-10-08 | End: 2023-10-27 | Stop reason: HOSPADM

## 2023-10-08 RX ORDER — RISPERIDONE 1 MG/1
1 TABLET ORAL DAILY
Status: DISCONTINUED | OUTPATIENT
Start: 2023-10-08 | End: 2023-10-08

## 2023-10-08 RX ADMIN — DOXYCYCLINE HYCLATE 100 MG: 100 TABLET, COATED ORAL at 09:10

## 2023-10-08 RX ADMIN — CYANOCOBALAMIN 1000 MCG: 1000 INJECTION, SOLUTION INTRAMUSCULAR; SUBCUTANEOUS at 09:10

## 2023-10-08 RX ADMIN — SENNOSIDES AND DOCUSATE SODIUM 1 TABLET: 50; 8.6 TABLET ORAL at 09:10

## 2023-10-08 RX ADMIN — SACUBITRIL AND VALSARTAN 1 TABLET: 24; 26 TABLET, FILM COATED ORAL at 09:10

## 2023-10-08 RX ADMIN — Medication 1 TABLET: at 09:10

## 2023-10-08 RX ADMIN — ENOXAPARIN SODIUM 40 MG: 40 INJECTION SUBCUTANEOUS at 05:10

## 2023-10-08 RX ADMIN — CARVEDILOL 3.12 MG: 3.12 TABLET, FILM COATED ORAL at 09:10

## 2023-10-08 RX ADMIN — TAMSULOSIN HYDROCHLORIDE 0.4 MG: 0.4 CAPSULE ORAL at 05:10

## 2023-10-08 RX ADMIN — DONEPEZIL HYDROCHLORIDE 5 MG: 5 TABLET, FILM COATED ORAL at 09:10

## 2023-10-08 RX ADMIN — RISPERIDONE 1 MG: 1 TABLET ORAL at 05:10

## 2023-10-08 RX ADMIN — Medication 6 MG: at 09:10

## 2023-10-08 NOTE — PROGRESS NOTES
[facility] Hospital Medicine Progress Note        Chief Complaint: Inpatient Follow-up for fall    HPI:   Danielle Loera is a 89 y.o. female with  PMHx of  HTN, CKD stage III, dementia, anemia;, anxiety;  presents to the ED s/p fall out of bed at home with associated head injury. It was reported per the family pt climbed out of bed and fell hitting the back of her head. Pt was recently admitted to our services last month when she had a fall at that itme and discharged to  rehab and was reportedly discharged from rehab the day of arrival in the ED. No reports of LOC, no reports of CP, SOB, N/V/D or fever per family reports.      Initial VS /75 P 76 R 16 T 99 F O2 saturation 98% on room air.  Labs reviewed demonstrated HH 9.2/28.2, BUN/Creat 22.8/1.09. CT of the head without contrast showed no new or worsened intracranial abnormality. Re-demonstrated posterior right parietal scalp hematoma without depressed skull fracture. CT of the cervical spine without contrast no convincing acute cervical spine abnormality. Pt is pleasantly confused and can not provide accurate historical information. Pt received zyprexa and doxycycline in the ED. Family is desiring NH placement. Pt is admit to hospital medicine services for further management.      Pt is noted to have serosanguinous discharge form altered skin integrity located occipital scalp. Wound care was continued and culture obtained.  Wound cultures grew MRSA and doxy was added.  Seroquel was continued to help with hospital-acquired delirium. Home meds are reviewed and resumed as appropriate.        Interval Hx:   No acute events reported overnight   Son at bedside   Pt appears calmer today . Awake, oriented to self and person.     BP is mostly controlled with occasional mod elevation. Afebrile, On RA    No AM labs today to review       Case was discussed with patient's nurse and  on the floor.    Objective/physical  exam:  General: In no acute distress, afebrile  Chest: Clear to auscultation bilaterally  Heart: RRR, +S1, S2, no appreciable murmur  Abdomen: Soft, nontender, BS +  MSK: Warm, no lower extremity edema, no clubbing or cyanosis  Neurologic: Alert and oriented to self only, Moves all ext spontaneously in the bed.      VITAL SIGNS: 24 HRS MIN & MAX LAST   Temp  Min: 97.5 °F (36.4 °C)  Max: 98.4 °F (36.9 °C) 97.5 °F (36.4 °C)   BP  Min: 118/75  Max: 163/83 121/68   Pulse  Min: 62  Max: 73  67   Resp  Min: 18  Max: 20 19   SpO2  Min: 91 %  Max: 100 % 100 %     I have reviewed the following labs:  Recent Labs   Lab 10/03/23  1106   WBC 3.20*   RBC 3.78*   HGB 11.5*   HCT 35.6*   MCV 94.2*   MCH 30.4   MCHC 32.3*   RDW 14.8   *   MPV 11.7*     Recent Labs   Lab 10/03/23  1106 10/07/23  0344    137   K 3.9 5.0   CO2 30 24   BUN 13.7 16.3   CREATININE 0.86 0.92   CALCIUM 8.5 8.0*   MG 2.00  --    ALBUMIN  --  2.6*   ALKPHOS  --  75   ALT  --  8   AST  --  26   BILITOT  --  0.3     Microbiology Results (last 7 days)       Procedure Component Value Units Date/Time    Wound Culture [[id number]]  (Abnormal)  (Susceptibility) Collected: 09/29/23 2204    Order Status: Completed Specimen: Abscess from Scalp Updated: 10/02/23 0949     Wound Culture Many Methicillin resistant Staphylococcus aureus             See below for Radiology    Scheduled Med:   B12-levomefolate calcium-B6  1 tablet Oral Daily    carvediloL  3.125 mg Oral BID WM    cyanocobalamin  1,000 mcg Intramuscular Daily    donepeziL  5 mg Oral QHS    enoxparin  40 mg Subcutaneous Daily    polyethylene glycol  17 g Oral Daily    risperiDONE  1 mg Oral After dinner    sacubitriL-valsartan  1 tablet Oral BID    senna-docusate 8.6-50 mg  1 tablet Oral BID      Continuous Infusions:     PRN Meds:  acetaminophen, aluminum-magnesium hydroxide-simethicone, bisacodyL, cloNIDine, hydrALAZINE, melatonin, naloxone, ondansetron, polyethylene glycol, prochlorperazine,  simethicone     Assessment/Plan:  Recurrent Fall at home  Recent closed head injury from fall, resulting in hematoma, posterior right parietal scalp hematoma  Infected Scalp wound -Culture positive for MRSA  Dementia with behavioral disturbances   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%, currently compensated   Advanced age   Age related physical Debility   Severe Vitamin B12 deficiency   Failure to thrive      Hx of Essential HTN, CKD stage III,      Plan:  Continue PT/OT service and supportive care   PT suggested SNF placement   CM consulted to assist with DC planning      Occipital scalp wound grew MRSA sensitive to doxycycline- completed   Continue neuro checks, seizure precautions, fall precautions   Seroquel changed to Risperidone 1 mg in pm   Home meds are reviewed and resumed as appropriate       VTE prophylaxis: Lovenox     Patient condition:  Fair     Anticipated discharge and Disposition:     SNF placement     All diagnosis and differential diagnosis have been reviewed; assessment and plan has been documented; I have personally reviewed the labs and test results that are presently available; I have reviewed the patients medication list; I have reviewed the consulting providers response and recommendations. I have reviewed or attempted to review medical records based upon their availability    All of the patient's questions have been  addressed and answered. Patient's is agreeable to the above stated plan. I will continue to monitor closely and make adjustments to medical management as needed.    Timur Hartmann MD   10/08/2023

## 2023-10-09 PROCEDURE — 25000003 PHARM REV CODE 250: Performed by: NURSE PRACTITIONER

## 2023-10-09 PROCEDURE — 25000003 PHARM REV CODE 250: Performed by: INTERNAL MEDICINE

## 2023-10-09 PROCEDURE — 97164 PT RE-EVAL EST PLAN CARE: CPT

## 2023-10-09 PROCEDURE — 63600175 PHARM REV CODE 636 W HCPCS: Performed by: INTERNAL MEDICINE

## 2023-10-09 PROCEDURE — 11000001 HC ACUTE MED/SURG PRIVATE ROOM

## 2023-10-09 PROCEDURE — 97116 GAIT TRAINING THERAPY: CPT

## 2023-10-09 PROCEDURE — 63600175 PHARM REV CODE 636 W HCPCS: Performed by: NURSE PRACTITIONER

## 2023-10-09 RX ORDER — DIVALPROEX SODIUM 500 MG/1
500 TABLET, FILM COATED, EXTENDED RELEASE ORAL NIGHTLY
Status: DISCONTINUED | OUTPATIENT
Start: 2023-10-09 | End: 2023-10-27 | Stop reason: HOSPADM

## 2023-10-09 RX ORDER — MUPIROCIN 20 MG/G
OINTMENT TOPICAL 2 TIMES DAILY
Status: DISPENSED | OUTPATIENT
Start: 2023-10-09 | End: 2023-10-14

## 2023-10-09 RX ORDER — RISPERIDONE 0.25 MG/1
0.5 TABLET ORAL
Status: DISCONTINUED | OUTPATIENT
Start: 2023-10-09 | End: 2023-10-20

## 2023-10-09 RX ADMIN — ENOXAPARIN SODIUM 40 MG: 40 INJECTION SUBCUTANEOUS at 06:10

## 2023-10-09 RX ADMIN — DIVALPROEX SODIUM 500 MG: 500 TABLET, FILM COATED, EXTENDED RELEASE ORAL at 08:10

## 2023-10-09 RX ADMIN — DONEPEZIL HYDROCHLORIDE 5 MG: 5 TABLET, FILM COATED ORAL at 08:10

## 2023-10-09 RX ADMIN — RISPERIDONE 0.5 MG: 0.25 TABLET, FILM COATED ORAL at 12:10

## 2023-10-09 RX ADMIN — SACUBITRIL AND VALSARTAN 1 TABLET: 24; 26 TABLET, FILM COATED ORAL at 08:10

## 2023-10-09 RX ADMIN — CARVEDILOL 3.12 MG: 3.12 TABLET, FILM COATED ORAL at 06:10

## 2023-10-09 RX ADMIN — Medication 6 MG: at 08:10

## 2023-10-09 RX ADMIN — RISPERIDONE 1 MG: 1 TABLET ORAL at 06:10

## 2023-10-09 RX ADMIN — MUPIROCIN: 20 OINTMENT TOPICAL at 08:10

## 2023-10-09 RX ADMIN — TAMSULOSIN HYDROCHLORIDE 0.4 MG: 0.4 CAPSULE ORAL at 09:10

## 2023-10-09 RX ADMIN — Medication 1 TABLET: at 09:10

## 2023-10-09 RX ADMIN — CYANOCOBALAMIN 1000 MCG: 1000 INJECTION, SOLUTION INTRAMUSCULAR; SUBCUTANEOUS at 09:10

## 2023-10-09 RX ADMIN — SENNOSIDES AND DOCUSATE SODIUM 1 TABLET: 50; 8.6 TABLET ORAL at 08:10

## 2023-10-09 NOTE — PROGRESS NOTES
[facility] Hospital Medicine Progress Note        Chief Complaint: Inpatient Follow-up for falls     HPI:     Danielle Loera is a 89 y.o. female with  PMHx of  HTN, CKD stage III, dementia, anemia;, anxiety;  presents to the ED s/p fall out of bed at home with associated head injury. It was reported per the family pt climbed out of bed and fell hitting the back of her head. Pt was recently admitted to our services last month when she had a fall at that itme and discharged to  rehab and was reportedly discharged from rehab the day of arrival in the ED. No reports of LOC, no reports of CP, SOB, N/V/D or fever per family reports.      Initial VS /75 P 76 R 16 T 99 F O2 saturation 98% on room air.  Labs reviewed demonstrated HH 9.2/28.2, BUN/Creat 22.8/1.09. CT of the head without contrast showed no new or worsened intracranial abnormality. Re-demonstrated posterior right parietal scalp hematoma without depressed skull fracture. CT of the cervical spine without contrast no convincing acute cervical spine abnormality. Pt is pleasantly confused and can not provide accurate historical information. Pt received zyprexa  in the ED. Family is desiring NH placement. Pt is admitted to  services for further management.      Pt is noted to have serosanguinous discharge form altered skin integrity located occipital scalp. Wound care was consulted and culture obtained.  Wound cultures grew MRSA and doxy was added.  Seroquel was continued to help with hospital-acquired delirium. Home meds are reviewed and resumed as appropriate      Interval Hx:   Pt is awake. Intermittently screams but cooperative otherwise. No agitation observed.   Vitals are stable   No am labs to review   Awaiting SNF placement     Case was discussed with patient's nurse and  on the floor.    Objective/physical exam:  General: In no acute distress, afebrile  Chest: Clear to auscultation bilaterally  Heart:  RRR, +S1, S2, no appreciable murmur  Abdomen: Soft, nontender, BS +  MSK: Warm, no lower extremity edema, no clubbing or cyanosis  Neurologic: Alert and oriented to self only, Moves all ext spontaneously in the bed.Follows commands        VITAL SIGNS: 24 HRS MIN & MAX LAST   Temp  Min: 97.3 °F (36.3 °C)  Max: 97.9 °F (36.6 °C) 97.7 °F (36.5 °C)   BP  Min: 107/61  Max: 135/79 135/79   Pulse  Min: 71  Max: 76  76   Resp  Min: 17  Max: 18 18   SpO2  Min: 98 %  Max: 100 % 98 %     I have reviewed the following labs:  Recent Labs   Lab 10/03/23  1106   WBC 3.20*   RBC 3.78*   HGB 11.5*   HCT 35.6*   MCV 94.2*   MCH 30.4   MCHC 32.3*   RDW 14.8   *   MPV 11.7*     Recent Labs   Lab 10/03/23  1106 10/07/23  0344    137   K 3.9 5.0   CO2 30 24   BUN 13.7 16.3   CREATININE 0.86 0.92   CALCIUM 8.5 8.0*   MG 2.00  --    ALBUMIN  --  2.6*   ALKPHOS  --  75   ALT  --  8   AST  --  26   BILITOT  --  0.3     Microbiology Results (last 7 days)       ** No results found for the last 168 hours. **             See below for Radiology    Scheduled Med:   B12-levomefolate calcium-B6  1 tablet Oral Daily    carvediloL  3.125 mg Oral BID WM    cyanocobalamin  1,000 mcg Intramuscular Daily    donepeziL  5 mg Oral QHS    enoxparin  40 mg Subcutaneous Daily    mupirocin   Nasal BID    polyethylene glycol  17 g Oral Daily    risperiDONE  0.5 mg Oral After lunch    risperiDONE  1 mg Oral After dinner    sacubitriL-valsartan  1 tablet Oral BID    senna-docusate 8.6-50 mg  1 tablet Oral BID    tamsulosin  0.4 mg Oral Daily      Continuous Infusions:     PRN Meds:  acetaminophen, aluminum-magnesium hydroxide-simethicone, bisacodyL, cloNIDine, hydrALAZINE, melatonin, naloxone, ondansetron, polyethylene glycol, prochlorperazine, simethicone     Assessment/Plan:  Recurrent Fall at home  Recent closed head injury from fall, resulting in scalp hematoma, posterior right parietal scalp   Infected Scalp wound -Culture positive for MRSA-  treated   Dementia with behavioral disturbances   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%, currently compensated   Advanced age   Age related physical Debility   Severe Vitamin B12 deficiency   Failure to thrive      Hx of Essential HTN, CKD stage III,      Plan:  Continue PT/OT service and supportive care   PT suggested SNF placement   CM consulted to assist with DC planning      Occipital scalp wound grew MRSA sensitive to doxycycline- completed   Continue neuro checks, seizure precautions, fall precautions   Seroquel changed to Risperidone 1 mg in pm and 0.5 mg am   Home meds are reviewed and resumed as appropriate        VTE prophylaxis: Lovenox    Patient condition:  Fair     Anticipated discharge and Disposition:     SNF placement     All diagnosis and differential diagnosis have been reviewed; assessment and plan has been documented; I have personally reviewed the labs and test results that are presently available; I have reviewed the patients medication list; I have reviewed the consulting providers response and recommendations. I have reviewed or attempted to review medical records based upon their availability    All of the patient's questions have been  addressed and answered. Patient's is agreeable to the above stated plan. I will continue to monitor closely and make adjustments to medical management as needed.      Timur Hartmann MD   10/09/2023

## 2023-10-09 NOTE — PLAN OF CARE
[facility] unable to accept. Sent SNF referral to [facility], and [facility] after speaking with [facility] via phone.     1420: [facility] unable to accept

## 2023-10-09 NOTE — PT/OT/SLP RE-EVAL
Physical Therapy Re-Evaluation    Patient Name:  Danielle Loera   MRN:  [id number]    Recommendations:     Discharge Recommendations: nursing facility, basic   Discharge Equipment Recommendations: to be determined by next level of care   Barriers to discharge: Decreased caregiver support    Assessment:     Danielle Loera is a 89 y.o. female admitted with a medical diagnosis of Frequent falls. Pt admitted following fall at home when attempting to get out of bed. Pt had been discharged from SNF facility same day.  She presents with the following impairments/functional limitations: weakness, impaired endurance, impaired self care skills, gait instability, impaired cognition, decreased safety awareness, impaired skin. Pt has progressed well with skilled interventions. She is overall min A/SBA for mobility with RW. She requires constant supervision due to cognitive deficits and poor safety awareness, this also limits her potential to progress to functional independence. PT recommending 24 hr care upon discharge.    Rehab Prognosis: Fair; patient would benefit from acute skilled PT services to address these deficits and reach maximum level of function.    Recent Surgery: * No surgery found *      Plan:     During this hospitalization, patient to be seen 3 x/week to address the identified rehab impairments via gait training, therapeutic activities, therapeutic exercises and progress toward the following goals:    Plan of Care Expires:  10/31/23    Subjective     Chief Complaint: none stated, pt attempted to get out of bed upon PT entry into room. Bed alarm going off with nurse at bedside assisting pt to return to bed.  Patient/Family Comments/goals: family seeking placement per chart review, no one at bedside during session  Pain/Comfort:  Pain Rating 1: 0/10  Pain Rating Post-Intervention 1: 0/10    Patients cultural, spiritual, Confucianism conflicts given the current situation: no    Objective:     Communicated with  nurse prior to session.  Patient found  with legs over EOB  with bed alarm sounding, nurse at bedside upon PT entry to room.    General Precautions: Standard, fall, contact  Orthopedic Precautions:N/A   Braces: N/A  Respiratory Status: Room air      Exams:  Cognitive Exam:  Patient is oriented to Person  RLE ROM: WFL  RLE Strength: WFL  LLE ROM: WFL  LLE Strength: WFL  Skin integrity:  open sore to posterior head, WC at bedside to assess and redress wound      Functional Mobility:  Bed Mobility:     Rolling Left:  stand by assistance  Supine to Sit: minimum assistance  Transfers:     Sit to Stand:  minimum assistance with rolling walker  Gait: 100ft + 75ft with RW CGA. Frequent cues to correct path of mobility as pt unable to navigate around obstacles. Also with redirection to task as pt easily distracted by surroundings. Seated rest between trials.      AM-PAC 6 CLICK MOBILITY  Total Score:17           Patient provided with verbal education education regarding safety with mobility.  Understanding was verbalized, however additional teaching warranted.     Patient left up in chair with all lines intact, call button in reach, chair alarm on, and nurse notified.    GOALS:   Multidisciplinary Problems       Physical Therapy Goals          Problem: Physical Therapy    Goal Priority Disciplines Outcome Goal Variances Interventions   Physical Therapy Goal     PT, PT/OT Ongoing, Progressing     Description: Goals to be met by: 10/31/23     Patient will increase functional independence with mobility by performin. Supine to sit with Modified [place]  2. Sit to stand transfer with Modified [place]  3. Gait  x 200 feet with Modified [place] using Rolling Walker.                          History:     Past Medical History:   Diagnosis Date    Anemia due to stage 3a chronic kidney disease 2023    Primary hypertension 2023    Severe dementia without behavioral disturbance, psychotic disturbance, mood  disturbance, or anxiety 2/23/2023    Vitamin D deficiency 5/12/2023       No past surgical history on file.    Time Tracking:     PT Received On: 10/09/23  PT Start Time: 1019     PT Stop Time: 1044  PT Total Time (min): 25 min     Billable Minutes: Re-eval 15min and Gait Training 10min      10/09/2023

## 2023-10-09 NOTE — PROGRESS NOTES
[facility] - 9th Floor Med Surg  Wound Care    Patient Name:  Danielle Loera   MRN:  [id number]  Date: 10/9/2023  Diagnosis: Frequent falls    History:     Past Medical History:   Diagnosis Date    Anemia due to stage 3a chronic kidney disease 5/12/2023    Primary hypertension 5/12/2023    Severe dementia without behavioral disturbance, psychotic disturbance, mood disturbance, or anxiety 2/23/2023    Vitamin D deficiency 5/12/2023       Social History     Socioeconomic History    Marital status:     Number of children: 4   Occupational History    Occupation: retied   Tobacco Use    Smoking status: Never     Passive exposure: Never    Smokeless tobacco: Never   Substance and Sexual Activity    Alcohol use: Never    Drug use: Never    Sexual activity: Not Currently     Social Determinants of Health     Financial Resource Strain: Low Risk  (8/2/2023)    Overall Financial Resource Strain (CARDIA)     Difficulty of Paying Living Expenses: Not hard at all   Food Insecurity: No Food Insecurity (8/2/2023)    Hunger Vital Sign     Worried About Running Out of Food in the Last Year: Never true     Ran Out of Food in the Last Year: Never true   Transportation Needs: No Transportation Needs (8/2/2023)    PRAPARE - Transportation     Lack of Transportation (Medical): No     Lack of Transportation (Non-Medical): No   Physical Activity: Insufficiently Active (8/2/2023)    Exercise Vital Sign     Days of Exercise per Week: 1 day     Minutes of Exercise per Session: 10 min   Stress: No Stress Concern Present (8/2/2023)    [employer] - Occupational Stress Questionnaire     Feeling of Stress : Not at all   Social Connections: Moderately Isolated (8/2/2023)    Social Connection and Isolation Panel [NHANES]     Frequency of Communication with Friends and Family: More than three times a week     Frequency of Social Gatherings with Friends and Family: More than three times a week      Attends Latter day Services: More than 4 times per year     Active Member of Clubs or Organizations: No     Attends Club or Organization Meetings: Never     Marital Status:    Housing Stability: Low Risk  (8/2/2023)    Housing Stability Vital Sign     Unable to Pay for Housing in the Last Year: No     Number of Places Lived in the Last Year: 1     Unstable Housing in the Last Year: No       Precautions:     Allergies as of 09/29/2023    (No Known Allergies)       WO Assessment Details/Treatment        10/09/23 1024   WOCN Assessment   Visit Date 10/09/23   Visit Time 1024   Consult Type Follow Up   WOCN Speciality Wound   Intervention assessed;applied;chart review   Teaching on-going   Skin Interventions   Device Skin Pressure Protection absorbent pad utilized/changed        Altered Skin Integrity 10/02/23 posterior Head Other (comment)   Date First Assessed: 10/02/23   Altered Skin Integrity Present on Admission - Did Patient arrive to the hospital with altered skin?: yes  Orientation: posterior  Location: Head  Primary Wound Type: (c) Other (comment)   Wound Image    Dressing Appearance No dressing   Drainage Amount Scant   Drainage Characteristics/Odor Serosanguineous   Appearance Red   Tissue loss description Full thickness   Black (%), Wound Tissue Color 0 %   Red (%), Wound Tissue Color 100 %   Yellow (%), Wound Tissue Color 0 %   Periwound Area Dry  (Hair around area)   Wound Edges Defined   Care Cleansed with:;Sterile normal saline   Dressing Applied;Gauze   Packing packed with;gauze, iodoform     WOCN follow up for occiput I&D. Continue with current treatment recommendations put into place.  Occiput head: Cleanse with NS. Pack with 1/4 inch iodoform. Cover foam dressing or medipore tape. Change daily. Nursing to continue with preventative measures.       10/09/2023

## 2023-10-10 PROCEDURE — 11000001 HC ACUTE MED/SURG PRIVATE ROOM

## 2023-10-10 PROCEDURE — 97110 THERAPEUTIC EXERCISES: CPT | Mod: CO

## 2023-10-10 PROCEDURE — 63600175 PHARM REV CODE 636 W HCPCS: Performed by: NURSE PRACTITIONER

## 2023-10-10 PROCEDURE — 25000003 PHARM REV CODE 250: Performed by: NURSE PRACTITIONER

## 2023-10-10 PROCEDURE — 25000003 PHARM REV CODE 250: Performed by: INTERNAL MEDICINE

## 2023-10-10 PROCEDURE — 63600175 PHARM REV CODE 636 W HCPCS: Performed by: INTERNAL MEDICINE

## 2023-10-10 RX ADMIN — POLYETHYLENE GLYCOL 3350 17 G: 17 POWDER, FOR SOLUTION ORAL at 08:10

## 2023-10-10 RX ADMIN — CARVEDILOL 3.12 MG: 3.12 TABLET, FILM COATED ORAL at 05:10

## 2023-10-10 RX ADMIN — SACUBITRIL AND VALSARTAN 1 TABLET: 24; 26 TABLET, FILM COATED ORAL at 08:10

## 2023-10-10 RX ADMIN — DONEPEZIL HYDROCHLORIDE 5 MG: 5 TABLET, FILM COATED ORAL at 08:10

## 2023-10-10 RX ADMIN — ENOXAPARIN SODIUM 40 MG: 40 INJECTION SUBCUTANEOUS at 05:10

## 2023-10-10 RX ADMIN — MUPIROCIN: 20 OINTMENT TOPICAL at 09:10

## 2023-10-10 RX ADMIN — MUPIROCIN: 20 OINTMENT TOPICAL at 08:10

## 2023-10-10 RX ADMIN — TAMSULOSIN HYDROCHLORIDE 0.4 MG: 0.4 CAPSULE ORAL at 08:10

## 2023-10-10 RX ADMIN — DIVALPROEX SODIUM 500 MG: 500 TABLET, FILM COATED, EXTENDED RELEASE ORAL at 08:10

## 2023-10-10 RX ADMIN — Medication 1 TABLET: at 08:10

## 2023-10-10 RX ADMIN — CYANOCOBALAMIN 1000 MCG: 1000 INJECTION, SOLUTION INTRAMUSCULAR; SUBCUTANEOUS at 08:10

## 2023-10-10 RX ADMIN — RISPERIDONE 1 MG: 1 TABLET ORAL at 05:10

## 2023-10-10 RX ADMIN — Medication 6 MG: at 08:10

## 2023-10-10 RX ADMIN — SENNOSIDES AND DOCUSATE SODIUM 1 TABLET: 50; 8.6 TABLET ORAL at 08:10

## 2023-10-10 RX ADMIN — RISPERIDONE 0.5 MG: 0.25 TABLET, FILM COATED ORAL at 01:10

## 2023-10-10 NOTE — PT/OT/SLP PROGRESS
Occupational Therapy   Treatment    Name: Danielle Loera  MRN: [id number]  Admitting Diagnosis:  Frequent falls       Recommendations:     Discharge Recommendations: nursing facility, basic  Discharge Equipment Recommendations:  to be determined by next level of care  Barriers to discharge:       Assessment:     Danielle Loera is a 89 y.o. female with a medical diagnosis of Frequent falls.  She presents with decreased cognition. Performance deficits affecting function are weakness, gait instability, impaired balance, impaired endurance, decreased safety awareness, impaired self care skills, impaired functional mobility.     Rehab Prognosis:  Fair; patient would benefit from acute skilled OT services to address these deficits and reach maximum level of function.       Plan:     Patient to be seen 4 x/week to address the above listed problems via self-care/home management, therapeutic activities, therapeutic exercises  Plan of Care Expires: 11/02/23  Plan of Care Reviewed with: patient    Subjective     Pain/Comfort:  N/A    Objective:     Communicated with: nurse prior to session.  Patient found HOB elevated with bed alarm, pulse ox (continuous) upon OT entry to room.    General Precautions: Standard, contact, fall    Orthopedic Precautions:N/A  Braces: N/A  Respiratory Status: Room air     Occupational Performance:     Therapeutic Exercise:  Completed AAROM to ZABRINAE, difficulty following directions    Therapeutic Positioning    OT interventions performed during the course of today's session in an effort to prevent and/or reduce acquired pressure injuries:   Therapeutic positioning was provided at the conclusion of session to offload all bony prominences for the prevention and/or reduction of pressure injuries    [facility] 6 Click ADL: 13    Patient Education:  Patient provided with verbal education and demonstrations education regarding safety awareness.  Additional teaching is warranted.      Patient left HOB  elevated with all lines intact and call button in reach    GOALS:   Multidisciplinary Problems       Occupational Therapy Goals          Problem: Occupational Therapy    Goal Priority Disciplines Outcome Interventions   Occupational Therapy Goal     OT, PT/OT Ongoing, Progressing    Description: Goals to be met by: 11/2/2023    Patient will increase functional independence with ADLs by performing:    UE Dressing with Contact Guard Assistance.  Grooming while seated at sink with Minimal Assistance.  Toileting from toilet with Minimal Assistance for hygiene and clothing management.   Toilet transfer to toilet with Minimal Assistance.                         Time Tracking:     OT Date of Treatment: 10/10/23  OT Start Time: 1134  OT Stop Time: 1145  OT Total Time (min): 11 min    Billable Minutes:Therapeutic Exercise 11    OT/JUSTIN: JUSTIN     Number of JUSTIN visits since last OT visit: 2    10/10/2023

## 2023-10-10 NOTE — PROGRESS NOTES
[facility] Hospital Medicine Progress Note        Chief Complaint: Inpatient Follow-up for falls     HPI:   Danielle Loera is a 89 y.o. female with  PMHx of  HTN, CKD stage III, dementia, anemia;, anxiety;  presents to the ED s/p fall out of bed at home with associated head injury. It was reported per the family pt climbed out of bed and fell hitting the back of her head. Pt was recently admitted to our services last month when she had a fall at that itme and discharged to  rehab and was reportedly discharged from rehab the day of arrival in the ED. No reports of LOC, no reports of CP, SOB, N/V/D or fever per family reports.      Initial VS /75 P 76 R 16 T 99 F O2 saturation 98% on room air.  Labs reviewed demonstrated HH 9.2/28.2, BUN/Creat 22.8/1.09. CT of the head without contrast showed no new or worsened intracranial abnormality. Re-demonstrated posterior right parietal scalp hematoma without depressed skull fracture. CT of the cervical spine without contrast no convincing acute cervical spine abnormality. Pt is pleasantly confused and can not provide accurate historical information. Pt received zyprexa  in the ED. Family is desiring NH placement. Pt is admitted to  services for further management.      Pt is noted to have serosanguinous discharge form altered skin integrity located occipital scalp. Wound care was consulted and culture obtained.  Wound cultures grew MRSA and doxy was added.  Seroquel was continued to help with hospital-acquired delirium. Later changed to risperidone and additionally Depakote was added for behavior control related to dementia with good result. Home meds are reviewed and resumed as appropriate. PT/OT consulted and SNF placement suggested. CM consulted to assist with DC planning.        Interval Hx:   No acute events reported overnight  Pt is much more calmer today. Not screaming at al.   Vitals are stable   Awaiting placement     Case  "was discussed with patient's nurse and  on the floor.    Objective/physical exam:    General: In no acute distress, afebrile  Chest: Clear to auscultation bilaterally  Heart: RRR, +S1, S2, no appreciable murmur  Abdomen: Soft, nontender, BS +  MSK: Warm, no lower extremity edema, no clubbing or cyanosis  Neurologic: Alert and oriented to self only, Moves all ext spontaneously in the bed.Follows commands       VITAL SIGNS: 24 HRS MIN & MAX LAST   Temp  Min: 96.1 °F (35.6 °C)  Max: 97.9 °F (36.6 °C) 96.4 °F (35.8 °C)   BP  Min: 104/67  Max: 127/75 107/71   Pulse  Min: 64  Max: 87  71   Resp  Min: 18  Max: 18 18   SpO2  Min: 99 %  Max: 99 % 99 %     I have reviewed the following labs:  No results for input(s): "WBC", "RBC", "HGB", "HCT", "MCV", "MCH", "MCHC", "RDW", "PLT", "MPV", "GRAN", "LYMPH", "MONO", "BASO", "NRBC" in the last 168 hours.  Recent Labs   Lab 10/07/23  0344      K 5.0   CO2 24   BUN 16.3   CREATININE 0.92   CALCIUM 8.0*   ALBUMIN 2.6*   ALKPHOS 75   ALT 8   AST 26   BILITOT 0.3     Microbiology Results (last 7 days)       ** No results found for the last 168 hours. **             See below for Radiology    Scheduled Med:   B12-levomefolate calcium-B6  1 tablet Oral Daily    carvediloL  3.125 mg Oral BID WM    divalproex ER  500 mg Oral Nightly    donepeziL  5 mg Oral QHS    enoxparin  40 mg Subcutaneous Daily    mupirocin   Nasal BID    polyethylene glycol  17 g Oral Daily    risperiDONE  0.5 mg Oral After lunch    risperiDONE  1 mg Oral After dinner    sacubitriL-valsartan  1 tablet Oral BID    senna-docusate 8.6-50 mg  1 tablet Oral BID    tamsulosin  0.4 mg Oral Daily      Continuous Infusions:     PRN Meds:  acetaminophen, aluminum-magnesium hydroxide-simethicone, bisacodyL, cloNIDine, hydrALAZINE, melatonin, naloxone, ondansetron, polyethylene glycol, prochlorperazine, simethicone     Assessment/Plan:  Recurrent Fall at home  Recent closed head injury from fall, resulting in " scalp hematoma, posterior right parietal scalp   Infected Scalp wound -Culture positive for MRSA- treated   Dementia with behavioral disturbances   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%, currently compensated   Advanced age   Age related physical Debility   Severe Vitamin B12 deficiency   Failure to thrive      Hx of Essential HTN, CKD stage III,      Plan:  Continue PT/OT service and supportive care   PT suggested SNF placement   CM consulted to assist with DC planning      Occipital scalp wound grew MRSA sensitive to doxycycline- completed   Continue neuro checks, seizure precautions, fall precautions   Seroquel changed to Risperidone 1 mg in pm and 0.5 mg am   Additionally Depakote added on 10/9/23   Home meds are reviewed and resumed as appropriate        VTE prophylaxis: Lovenox     Patient condition:  Fair    Anticipated discharge and Disposition:     SNF placement     All diagnosis and differential diagnosis have been reviewed; assessment and plan has been documented; I have personally reviewed the labs and test results that are presently available; I have reviewed the patients medication list; I have reviewed the consulting providers response and recommendations. I have reviewed or attempted to review medical records based upon their availability    All of the patient's questions have been  addressed and answered. Patient's is agreeable to the above stated plan. I will continue to monitor closely and make adjustments to medical management as needed.      Timur Hartmann MD   10/10/2023

## 2023-10-10 NOTE — PLAN OF CARE
Spoke to [facility], stated willing to accept. LOTO is reaching out to family and will notify CM on decision.

## 2023-10-11 LAB
ANION GAP SERPL CALC-SCNC: 4 MEQ/L
BASOPHILS # BLD AUTO: 0.01 X10(3)/MCL
BASOPHILS NFR BLD AUTO: 0.3 %
BUN SERPL-MCNC: 14.9 MG/DL (ref 9.8–20.1)
CALCIUM SERPL-MCNC: 8.2 MG/DL (ref 8.4–10.2)
CHLORIDE SERPL-SCNC: 105 MMOL/L (ref 98–111)
CO2 SERPL-SCNC: 30 MMOL/L (ref 23–31)
CREAT SERPL-MCNC: 0.81 MG/DL (ref 0.55–1.02)
CREAT/UREA NIT SERPL: 18
EOSINOPHIL # BLD AUTO: 0.02 X10(3)/MCL (ref 0–0.9)
EOSINOPHIL NFR BLD AUTO: 0.7 %
ERYTHROCYTE [DISTWIDTH] IN BLOOD BY AUTOMATED COUNT: 14.9 % (ref 11.5–17)
GFR SERPLBLD CREATININE-BSD FMLA CKD-EPI: >60 MLS/MIN/1.73/M2
GLUCOSE SERPL-MCNC: 109 MG/DL (ref 75–121)
HCT VFR BLD AUTO: 28.7 % (ref 37–47)
HGB BLD-MCNC: 9.4 G/DL (ref 12–16)
IMM GRANULOCYTES # BLD AUTO: 0.02 X10(3)/MCL (ref 0–0.04)
IMM GRANULOCYTES NFR BLD AUTO: 0.7 %
LYMPHOCYTES # BLD AUTO: 1.42 X10(3)/MCL (ref 0.6–4.6)
LYMPHOCYTES NFR BLD AUTO: 48.8 %
MAGNESIUM SERPL-MCNC: 2.1 MG/DL (ref 1.6–2.6)
MCH RBC QN AUTO: 30 PG (ref 27–31)
MCHC RBC AUTO-ENTMCNC: 32.8 G/DL (ref 33–36)
MCV RBC AUTO: 91.7 FL (ref 80–94)
MONOCYTES # BLD AUTO: 0.41 X10(3)/MCL (ref 0.1–1.3)
MONOCYTES NFR BLD AUTO: 14.1 %
NEUTROPHILS # BLD AUTO: 1.03 X10(3)/MCL (ref 2.1–9.2)
NEUTROPHILS NFR BLD AUTO: 35.4 %
NRBC BLD AUTO-RTO: 0 %
PHOSPHATE SERPL-MCNC: 3.2 MG/DL (ref 2.3–4.7)
PLATELET # BLD AUTO: 92 X10(3)/MCL (ref 130–400)
PMV BLD AUTO: 11.4 FL (ref 7.4–10.4)
POTASSIUM SERPL-SCNC: 3.9 MMOL/L (ref 3.5–5.1)
RBC # BLD AUTO: 3.13 X10(6)/MCL (ref 4.2–5.4)
SODIUM SERPL-SCNC: 139 MMOL/L (ref 132–146)
WBC # SPEC AUTO: 2.91 X10(3)/MCL (ref 4.5–11.5)

## 2023-10-11 PROCEDURE — 63600175 PHARM REV CODE 636 W HCPCS: Performed by: NURSE PRACTITIONER

## 2023-10-11 PROCEDURE — 83735 ASSAY OF MAGNESIUM: CPT | Performed by: INTERNAL MEDICINE

## 2023-10-11 PROCEDURE — 84100 ASSAY OF PHOSPHORUS: CPT | Performed by: INTERNAL MEDICINE

## 2023-10-11 PROCEDURE — 85025 COMPLETE CBC W/AUTO DIFF WBC: CPT | Performed by: INTERNAL MEDICINE

## 2023-10-11 PROCEDURE — 25000003 PHARM REV CODE 250: Performed by: INTERNAL MEDICINE

## 2023-10-11 PROCEDURE — 97530 THERAPEUTIC ACTIVITIES: CPT | Mod: CQ

## 2023-10-11 PROCEDURE — 11000001 HC ACUTE MED/SURG PRIVATE ROOM

## 2023-10-11 PROCEDURE — 25000003 PHARM REV CODE 250: Performed by: NURSE PRACTITIONER

## 2023-10-11 PROCEDURE — 80048 BASIC METABOLIC PNL TOTAL CA: CPT | Performed by: INTERNAL MEDICINE

## 2023-10-11 PROCEDURE — 97116 GAIT TRAINING THERAPY: CPT | Mod: CQ

## 2023-10-11 RX ADMIN — POLYETHYLENE GLYCOL 3350 17 G: 17 POWDER, FOR SOLUTION ORAL at 09:10

## 2023-10-11 RX ADMIN — MUPIROCIN: 20 OINTMENT TOPICAL at 08:10

## 2023-10-11 RX ADMIN — DONEPEZIL HYDROCHLORIDE 5 MG: 5 TABLET, FILM COATED ORAL at 09:10

## 2023-10-11 RX ADMIN — CARVEDILOL 3.12 MG: 3.12 TABLET, FILM COATED ORAL at 05:10

## 2023-10-11 RX ADMIN — TAMSULOSIN HYDROCHLORIDE 0.4 MG: 0.4 CAPSULE ORAL at 09:10

## 2023-10-11 RX ADMIN — RISPERIDONE 0.5 MG: 0.25 TABLET, FILM COATED ORAL at 12:10

## 2023-10-11 RX ADMIN — SACUBITRIL AND VALSARTAN 1 TABLET: 24; 26 TABLET, FILM COATED ORAL at 09:10

## 2023-10-11 RX ADMIN — RISPERIDONE 1 MG: 1 TABLET ORAL at 05:10

## 2023-10-11 RX ADMIN — Medication 1 TABLET: at 09:10

## 2023-10-11 RX ADMIN — CARVEDILOL 3.12 MG: 3.12 TABLET, FILM COATED ORAL at 06:10

## 2023-10-11 RX ADMIN — MUPIROCIN: 20 OINTMENT TOPICAL at 09:10

## 2023-10-11 RX ADMIN — DIVALPROEX SODIUM 500 MG: 500 TABLET, FILM COATED, EXTENDED RELEASE ORAL at 08:10

## 2023-10-11 RX ADMIN — SACUBITRIL AND VALSARTAN 1 TABLET: 24; 26 TABLET, FILM COATED ORAL at 08:10

## 2023-10-11 RX ADMIN — SENNOSIDES AND DOCUSATE SODIUM 1 TABLET: 50; 8.6 TABLET ORAL at 09:10

## 2023-10-11 RX ADMIN — ENOXAPARIN SODIUM 40 MG: 40 INJECTION SUBCUTANEOUS at 05:10

## 2023-10-11 RX ADMIN — SENNOSIDES AND DOCUSATE SODIUM 1 TABLET: 50; 8.6 TABLET ORAL at 08:10

## 2023-10-11 NOTE — PROGRESS NOTES
[facility] Hospital Medicine Progress Note        Chief Complaint: Inpatient Follow-up for falls     HPI:   Danielle Loera is a 89 y.o. female with  PMHx of  HTN, CKD stage III, dementia, anemia;, anxiety;  presents to the ED s/p fall out of bed at home with associated head injury. It was reported per the family pt climbed out of bed and fell hitting the back of her head. Pt was recently admitted to our services last month when she had a fall at that itme and discharged to  rehab and was reportedly discharged from rehab the day of arrival in the ED. No reports of LOC, no reports of CP, SOB, N/V/D or fever per family reports.      Initial VS /75 P 76 R 16 T 99 F O2 saturation 98% on room air.  Labs reviewed demonstrated HH 9.2/28.2, BUN/Creat 22.8/1.09. CT of the head without contrast showed no new or worsened intracranial abnormality. Re-demonstrated posterior right parietal scalp hematoma without depressed skull fracture. CT of the cervical spine without contrast no convincing acute cervical spine abnormality. Pt is pleasantly confused and can not provide accurate historical information. Pt received zyprexa  in the ED. Family is desiring NH placement. Pt is admitted to  services for further management.      Pt is noted to have serosanguinous discharge form altered skin integrity located occipital scalp. Wound care was consulted and culture obtained.  Wound cultures grew MRSA and doxy was added.  Seroquel was continued to help with hospital-acquired delirium. Later changed to risperidone and additionally Depakote was added for behavior control related to dementia with good result. Home meds are reviewed and resumed as appropriate. PT/OT consulted and SNF placement suggested. CM consulted to assist with DC planning.         Interval Hx:   No acute events reported overnight  Pt is much more calmer now. Not screaming as much.   Vitals are stable   Awaiting placement      Case  was discussed with patient's nurse and  on the floor.     Case was discussed with patient's nurse and  on the floor.    Objective/physical exam:    General: In no acute distress, afebrile  Chest: Clear to auscultation bilaterally  Heart: RRR, +S1, S2, no appreciable murmur  Abdomen: Soft, nontender, BS +  MSK: Warm, no lower extremity edema, no clubbing or cyanosis  Neurologic: Alert and oriented to self only, Moves all ext spontaneously in the bed.Follows commands          VITAL SIGNS: 24 HRS MIN & MAX LAST   Temp  Min: 96.2 °F (35.7 °C)  Max: 98 °F (36.7 °C) 97.3 °F (36.3 °C)   BP  Min: 107/71  Max: 142/91 137/73   Pulse  Min: 61  Max: 78  66   Resp  Min: 18  Max: 18 18   SpO2  Min: 97 %  Max: 99 % 97 %     I have reviewed the following labs:  Recent Labs   Lab 10/11/23  0454   WBC 2.91*   RBC 3.13*   HGB 9.4*   HCT 28.7*   MCV 91.7   MCH 30.0   MCHC 32.8*   RDW 14.9   PLT 92*   MPV 11.4*     Recent Labs   Lab 10/07/23  0344 10/11/23  0454    139   K 5.0 3.9   CO2 24 30   BUN 16.3 14.9   CREATININE 0.92 0.81   CALCIUM 8.0* 8.2*   MG  --  2.10   ALBUMIN 2.6*  --    ALKPHOS 75  --    ALT 8  --    AST 26  --    BILITOT 0.3  --      Microbiology Results (last 7 days)       ** No results found for the last 168 hours. **             See below for Radiology    Scheduled Med:   B12-levomefolate calcium-B6  1 tablet Oral Daily    carvediloL  3.125 mg Oral BID WM    divalproex ER  500 mg Oral Nightly    donepeziL  5 mg Oral QHS    enoxparin  40 mg Subcutaneous Daily    mupirocin   Nasal BID    polyethylene glycol  17 g Oral Daily    risperiDONE  0.5 mg Oral After lunch    risperiDONE  1 mg Oral After dinner    sacubitriL-valsartan  1 tablet Oral BID    senna-docusate 8.6-50 mg  1 tablet Oral BID    tamsulosin  0.4 mg Oral Daily      Continuous Infusions:     PRN Meds:  acetaminophen, aluminum-magnesium hydroxide-simethicone, bisacodyL, cloNIDine, hydrALAZINE, melatonin, naloxone, ondansetron,  polyethylene glycol, prochlorperazine, simethicone     Assessment/Plan:  Failure to thrive   Recurrent Fall at home  Recent closed head injury from fall, resulting in scalp hematoma, posterior right parietal scalp   Infected Scalp wound -Culture positive for MRSA- treated   Dementia with behavioral disturbances   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%, currently compensated   Advanced age   Age related physical Debility   Severe Vitamin B12 deficiency   Pancytopenia      Hx of Essential HTN, CKD stage III,      Plan:  Continue PT/OT service and supportive care   PT suggested SNF placement   CM consulted to assist with DC planning      Occipital scalp wound grew MRSA sensitive to doxycycline- completed   Continue neuro checks, seizure precautions, fall precautions   Seroquel changed to Risperidone 1 mg in pm and 0.5 mg am   Additionally Depakote added on 10/9/23   Home meds are reviewed and resumed as appropriate     Labs today showed pancytopenia with mild leukopenia and mild thrombocytopenia, Pt could possibly have  myelofibrosis. Supportive treatment would be the best course of action given advanced age.       VTE prophylaxis: Lovenox      Patient condition:  Fair     Anticipated discharge and Disposition:     SNF placement           Timur Hartmann MD   10/11/2023

## 2023-10-11 NOTE — PROGRESS NOTES
Inpatient Nutrition Assessment    Admit Date: 9/29/2023   Total duration of encounter: 12 days     Nutrition Recommendation/Prescription     Continue current diet as tolerated  Continue Boost Plus BID (provides 360 kcal and 14 g protein per container)  Encouraged adequate PO intake  Monitor appetite/PO intake, weight, and labs   Consider adding appetite stimulant if medically appropriate    Communication of Recommendations: reviewed with nurse    Nutrition Assessment     Malnutrition Assessment/Nutrition-Focused Physical Exam    Malnutrition Context: other (see comments) (Does not meet criteria at this time) (10/03/23 1455)  Malnutrition Level: other (see comments) (Does not meet criteria at this time) (10/03/23 1455)  Energy Intake (Malnutrition): other (see comments) (Unable to assess) (10/03/23 1455)  Weight Loss (Malnutrition): greater than 5% in 1 month (10/03/23 1455)  Subcutaneous Fat (Malnutrition): other (see comments) (Does not meet criteria) (10/03/23 1455)           Muscle Mass (Malnutrition): other (see comments) (Does not meet criteria) (10/03/23 1455)                          Fluid Accumulation (Malnutrition): other (see comments) (Does not meet criteria) (10/03/23 1455)        A minimum of two characteristics is recommended for diagnosis of either severe or non-severe malnutrition.    Chart Review    Reason Seen: continuous nutrition monitoring and follow-up wound    Malnutrition Screening Tool Results   Have you recently lost weight without trying?: No  Have you been eating poorly because of a decreased appetite?: No   MST Score: 0   Diagnosis:  Acute encephalopathy in the setting of dementia  Recent closed head injury from fall, resulting in hematoma with confusion  MRSA positive scalp wound cultures  Dementia with behavioral disturbances    Relevant Medical History:   HTN  CKD stage III  Dementia  Anemia  Vitamin D deficiency    Nutrition-Related Medications:   Scheduled Meds:   B12-levomefolate  calcium-B6  1 tablet Oral Daily    carvediloL  3.125 mg Oral BID WM    divalproex ER  500 mg Oral Nightly    donepeziL  5 mg Oral QHS    enoxparin  40 mg Subcutaneous Daily    mupirocin   Nasal BID    polyethylene glycol  17 g Oral Daily    risperiDONE  0.5 mg Oral After lunch    risperiDONE  1 mg Oral After dinner    sacubitriL-valsartan  1 tablet Oral BID    senna-docusate 8.6-50 mg  1 tablet Oral BID    tamsulosin  0.4 mg Oral Daily     Continuous Infusions:  PRN Meds:.acetaminophen, aluminum-magnesium hydroxide-simethicone, bisacodyL, cloNIDine, hydrALAZINE, melatonin, naloxone, ondansetron, polyethylene glycol, prochlorperazine, simethicone    Calorie Containing IV Medications: no significant kcals from medications at this time    Nutrition-Related Labs:  10/3/2023: Gluc 94  10/6/2023: no new labs  10/11: Ca 8.2    Diet/PN Order: Diet heart healthy  Oral Supplement Order: Boost Plus  Tube Feeding Order: none  Appetite/Oral Intake: poor/0-25% of meals  Factors Affecting Nutritional Intake: decreased appetite  Food/Sabianism/Cultural Preferences: unable to obtain  Food Allergies: no known food allergies    Wound(s):      Altered Skin Integrity 10/02/23 posterior Head Other (comment)-Tissue loss description: Full thickness noted    Last Bowel Movement: 10/08/23    Comments    10/3/2023: Pt unable to answer questions clearly. Per tray observation, pt has <25% PO intake of breakfast. Pt reported not being hungry. Will add Boost Plus BID as preventative MNT. Encouraged adequate PO intake. No reported N/V. Last BM documented as 9/29/2023. Per EMR, pt weighed 71.7 kg on 9/6/2023 (9.5% wt loss in 1 month, significant). Will monitor.    10/6/2023: Spoke with nurse.The nurse reports pt refused breakfast, presumed poor appetite. The nurse denies N/V/D/C. Last BM noted. Boost Plus ordered. Encouraged adequate PO intake.    10/11 pt sleeping, ate about 25% of lunch per nursing, drinking some  "boost    Anthropometrics    Height: 5' 7" (170.2 cm) Height Method: Stated  Last Weight: 64.9 kg (143 lb) (23) Weight Method: Standard Scale  BMI (Calculated): 22.4  BMI Classification: normal (BMI 18.5-24.9)     Ideal Body Weight (IBW), Female: 135 lb     % Ideal Body Weight, Female (lb): 105.93 %                    Usual Body Weight (UBW), k.7 kg  % Usual Body Weight: 90.66     Usual Weight Provided By: EMR weight history    Wt Readings from Last 5 Encounters:   23 64.9 kg (143 lb)   23 71.7 kg (158 lb)   23 71.7 kg (158 lb)   23 75.3 kg (166 lb)   23 69 kg (152 lb 1.9 oz)     Weight Change(s) Since Admission:  Admit Weight: 64.9 kg (143 lb) (23)  2023: 64.9 kg  10/6/2023: no new wts    Estimated Needs    Weight Used For Calorie Calculations: 64.9 kg (143 lb 1.3 oz)  Energy Calorie Requirements (kcal): 9171-4263 (25-30 kcal/kg)  Energy Need Method: Kcal/kg  Weight Used For Protein Calculations: 64.9 kg (143 lb 1.3 oz)  Protein Requirements: 65-78 (1.0-1.2 g/kg)  Fluid Requirements (mL): 1622 (1 mL/kcal)  Temp (24hrs), Av.3 °F (36.3 °C), Min:96.2 °F (35.7 °C), Max:98 °F (36.7 °C)       Enteral Nutrition    Patient not receiving enteral nutrition at this time.    Parenteral Nutrition    Patient not receiving parenteral nutrition support at this time.    Evaluation of Received Nutrient Intake    Calories: not meeting estimated needs  Protein: not meeting estimated needs    Patient Education    Not applicable.    Nutrition Diagnosis     PES: Inadequate oral intake related to acute illness as evidenced by poor PO intake since admit. (continues)    Interventions/Goals     Intervention(s): general/healthful diet and commercial beverage  Goal: Meet greater than 75% of nutritional needs by follow-up. (goal progressing)    Monitoring & Evaluation     Dietitian will monitor food and beverage intake, weight, electrolyte/renal panel, glucose/endocrine profile, " and gastrointestinal profile.  Nutrition Risk/Follow-Up: high (follow-up in 1-4 days)   Please consult if re-assessment needed sooner.

## 2023-10-11 NOTE — PLAN OF CARE
Problem: Adult Inpatient Plan of Care  Goal: Plan of Care Review  10/10/2023 2332 by Dinesh Bhatt LPN  Outcome: Ongoing, Progressing  10/10/2023 2332 by Dinesh Bhatt LPN  Outcome: Ongoing, Progressing  Goal: Patient-Specific Goal (Individualized)  10/10/2023 2332 by Dinesh Bhatt LPN  Outcome: Ongoing, Progressing  10/10/2023 2332 by Dinesh Bhatt LPN  Outcome: Ongoing, Progressing  Goal: Absence of Hospital-Acquired Illness or Injury  10/10/2023 2332 by Dinesh Bhatt LPN  Outcome: Ongoing, Progressing  10/10/2023 2332 by Dinesh Bhatt LPN  Outcome: Ongoing, Progressing  Goal: Optimal Comfort and Wellbeing  10/10/2023 2332 by Dinesh hBatt LPN  Outcome: Ongoing, Progressing  10/10/2023 2332 by Dinesh Bhatt LPN  Outcome: Ongoing, Progressing  Goal: Readiness for Transition of Care  10/10/2023 2332 by Dinesh Bhatt LPN  Outcome: Ongoing, Progressing  10/10/2023 2332 by Dinesh Bhatt LPN  Outcome: Ongoing, Progressing     Problem: Skin Injury Risk Increased  Goal: Skin Health and Integrity  10/10/2023 2332 by Dinesh Bhatt LPN  Outcome: Ongoing, Progressing  10/10/2023 2332 by Dinesh Bhatt LPN  Outcome: Ongoing, Progressing     Problem: Fall Injury Risk  Goal: Absence of Fall and Fall-Related Injury  10/10/2023 2332 by Dinesh Bhatt LPN  Outcome: Ongoing, Progressing  10/10/2023 2332 by Dinesh Bhatt LPN  Outcome: Ongoing, Progressing     Problem: Impaired Wound Healing  Goal: Optimal Wound Healing  10/10/2023 2332 by Dinesh Bhatt LPN  Outcome: Ongoing, Progressing  10/10/2023 2332 by Dinesh Bhatt LPN  Outcome: Ongoing, Progressing

## 2023-10-11 NOTE — PT/OT/SLP PROGRESS
Physical Therapy Treatment    Patient Name:  Danielle Loera   MRN:  [id number]    Recommendations:     Discharge Recommendations: nursing facility, basic  Discharge Equipment Recommendations: to be determined by next level of care  Barriers to discharge: Impaired mobility    Assessment:     Danielle Loera is a 89 y.o. female admitted with a medical diagnosis of Frequent falls.  She presents with the following impairments/functional limitations: weakness, gait instability, impaired balance, decreased safety awareness, impaired self care skills, impaired functional mobility .    Rehab Prognosis: Good and Fair; patient would benefit from acute skilled PT services to address these deficits and reach maximum level of function.    Recent Surgery: * No surgery found *      Plan:     During this hospitalization, patient to be seen 3 x/week to address the identified rehab impairments via therapeutic activities, therapeutic exercises and progress toward the following goals:    Plan of Care Expires:  10/31/23    Subjective     Chief Complaint: none expressed  Patient/Family Comments/goals: to get out of bed  Pain/Comfort:  Pain Rating 1: 0/10      Objective:     Communicated with pts nurse prior to session.  Patient found HOB elevated with   upon PT entry to room.     General Precautions: Standard, contact, fall  Orthopedic Precautions: N/A  Braces: N/A  Respiratory Status: Room air  Blood Pressure: 120/77  Skin Integrity: Visible skin intact      Functional Mobility:  Bed Mobility:     Rolling Right: contact guard assistance and tactile/verbal cues for hand placement/sequence  Scooting: minimum assistance  Supine to Sit: minimum assistance  Transfers:     Sit to Stand:  contact guard assistance and minimum assistance with rolling walker  Bed to Chair: contact guard assistance and minimum assistance with  rolling walker  using  Step Transfer and assistance with turning w/c.  Gait: Pt ambulated 84 ft w/ RW, working on  increase SLS time and step length.  Pt ambulates very slow, requiring encouragement to continue and pt stating she was tired or needed to sit.   Balance: Static balance in standing w/ walker working on hip and knee extension, requiring SB/ CGA    Education:  Patient provided with verbal education and demonstrations education regarding bed mobility, gait and transfers.  Understanding was verbalized, however additional teaching warranted.     Patient left up in chair with call button in reach, chair alarm on, and nurse notified..    GOALS:   Multidisciplinary Problems       Physical Therapy Goals          Problem: Physical Therapy    Goal Priority Disciplines Outcome Goal Variances Interventions   Physical Therapy Goal     PT, PT/OT Ongoing, Progressing     Description: Goals to be met by: 10/31/23     Patient will increase functional independence with mobility by performin. Supine to sit with Modified [place]  2. Sit to stand transfer with Modified [place]  3. Gait  x 200 feet with Modified [place] using Rolling Walker.                          Time Tracking:     PT Received On: 10/11/23  PT Start Time: 1051     PT Stop Time: 1119  PT Total Time (min): 28 min     Billable Minutes: Gait Training 12 and Therapeutic Activity 16    Treatment Type: Treatment  PT/PTA: PTA     Number of PTA visits since last PT visit: 1     10/11/2023

## 2023-10-12 PROCEDURE — 11000001 HC ACUTE MED/SURG PRIVATE ROOM

## 2023-10-12 PROCEDURE — 97535 SELF CARE MNGMENT TRAINING: CPT | Mod: CO

## 2023-10-12 PROCEDURE — 63600175 PHARM REV CODE 636 W HCPCS: Performed by: NURSE PRACTITIONER

## 2023-10-12 PROCEDURE — 97530 THERAPEUTIC ACTIVITIES: CPT | Mod: CQ

## 2023-10-12 PROCEDURE — 25000003 PHARM REV CODE 250: Performed by: INTERNAL MEDICINE

## 2023-10-12 PROCEDURE — 97530 THERAPEUTIC ACTIVITIES: CPT | Mod: CO

## 2023-10-12 PROCEDURE — 25000003 PHARM REV CODE 250: Performed by: NURSE PRACTITIONER

## 2023-10-12 RX ADMIN — ENOXAPARIN SODIUM 40 MG: 40 INJECTION SUBCUTANEOUS at 06:10

## 2023-10-12 RX ADMIN — SENNOSIDES AND DOCUSATE SODIUM 1 TABLET: 50; 8.6 TABLET ORAL at 08:10

## 2023-10-12 RX ADMIN — DONEPEZIL HYDROCHLORIDE 5 MG: 5 TABLET, FILM COATED ORAL at 08:10

## 2023-10-12 RX ADMIN — Medication 6 MG: at 08:10

## 2023-10-12 RX ADMIN — SODIUM CHLORIDE 1000 ML: 9 INJECTION, SOLUTION INTRAVENOUS at 07:10

## 2023-10-12 RX ADMIN — RISPERIDONE 1 MG: 1 TABLET ORAL at 06:10

## 2023-10-12 RX ADMIN — DIVALPROEX SODIUM 500 MG: 500 TABLET, FILM COATED, EXTENDED RELEASE ORAL at 08:10

## 2023-10-12 RX ADMIN — MUPIROCIN: 20 OINTMENT TOPICAL at 08:10

## 2023-10-12 NOTE — PLAN OF CARE
[facility] sent update to Raegan, Awaiting a call from Berenice .

## 2023-10-12 NOTE — PLAN OF CARE
[facility] spoke LOTO the will reach out to [employer] to see if they would like for his mother to be skill to long term.

## 2023-10-12 NOTE — PT/OT/SLP PROGRESS
Occupational Therapy   Treatment    Name: Danielle Loera  MRN: [id number]  Admitting Diagnosis:  Frequent falls       Recommendations:     Discharge Recommendations: nursing facility, basic  Discharge Equipment Recommendations:  to be determined by next level of care  Barriers to discharge:       Assessment:     Danielle Loera is a 89 y.o. female with a medical diagnosis of Frequent falls.  She presents with decreased cognition. Performance deficits affecting function are weakness, gait instability, impaired balance, impaired endurance, impaired coordination, decreased safety awareness, impaired cognition, impaired self care skills, impaired functional mobility.     Rehab Prognosis:  Fair; patient would benefit from acute skilled OT services to address these deficits and reach maximum level of function.       Plan:     Patient to be seen 4 x/week to address the above listed problems via self-care/home management, therapeutic activities, therapeutic exercises  Plan of Care Expires: 11/02/23  Plan of Care Reviewed with: patient    Subjective     Pain/Comfort:  No complaints    Objective:     Communicated with: nurse prior to session.  Patient found HOB elevated with pulse ox (continuous), bed alarm, peripheral IV upon OT entry to room.    General Precautions: Standard, contact    Orthopedic Precautions:N/A  Braces: N/A  Respiratory Status: Room air     Occupational Performance:     Activities of Daily Living:  Washing face with hand over hand assist    Therapeutic Activities:  AAROM to BUE, functional reaching ax     [facility] 6 Click ADL: 11    Patient Education:  Patient provided with verbal education and demonstrations education regarding safety awareness.  Additional teaching is warranted.      Patient left HOB elevated with all lines intact and call button in reach    GOALS:   Multidisciplinary Problems       Occupational Therapy Goals          Problem: Occupational Therapy    Goal Priority Disciplines Outcome  Interventions   Occupational Therapy Goal     OT, PT/OT Ongoing, Progressing    Description: Goals to be met by: 11/2/2023    Patient will increase functional independence with ADLs by performing:    UE Dressing with Contact Guard Assistance.  Grooming while seated at sink with Minimal Assistance.  Toileting from toilet with Minimal Assistance for hygiene and clothing management.   Toilet transfer to toilet with Minimal Assistance.                         Time Tracking:     OT Date of Treatment: 10/12/23  OT Start Time: 1612  OT Stop Time: 1636  OT Total Time (min): 24 min    Billable Minutes:Self Care/Home Management 10  Therapeutic Activity 14    OT/JUSTIN: JUSTIN     Number of JUSTIN visits since last OT visit: 3    10/12/2023

## 2023-10-12 NOTE — PROGRESS NOTES
[facility] Hospital Medicine Progress Note        Chief Complaint: Inpatient follow-up on failure to thrive    HPI:   Danielle Loera is a 89 y.o. female with  PMHx of  HTN, CKD stage III, dementia, anemia;, anxiety;  presents to the ED s/p fall out of bed at home with associated head injury. It was reported per the family pt climbed out of bed and fell hitting the back of her head. Pt was recently admitted to our services last month when she had a fall at that itme and discharged to  rehab and was reportedly discharged from rehab the day of arrival in the ED. No reports of LOC, no reports of CP, SOB, N/V/D or fever per family reports.      Initial VS /75 P 76 R 16 T 99 F O2 saturation 98% on room air.  Labs reviewed demonstrated HH 9.2/28.2, BUN/Creat 22.8/1.09. CT of the head without contrast showed no new or worsened intracranial abnormality. Re-demonstrated posterior right parietal scalp hematoma without depressed skull fracture. CT of the cervical spine without contrast no convincing acute cervical spine abnormality. Pt is pleasantly confused and can not provide accurate historical information. Pt received zyprexa  in the ED. Family is desiring NH placement. Pt is admitted to  services for further management.      Pt is noted to have serosanguinous discharge form altered skin integrity located occipital scalp. Wound care was consulted and culture obtained.  Wound cultures grew MRSA and doxy was added.  Seroquel was continued to help with hospital-acquired delirium. Later changed to risperidone and additionally Depakote was added for behavior control related to dementia with good result. Home meds are reviewed and resumed as appropriate. PT/OT consulted and SNF placement suggested. CM consulted to assist with DC planning.      Interval Hx:   Patient is resting comfortably with no new issues reported.  Patient is afebrile, on room air, and hemodynamically stable.         Objective/physical exam:  General:  Cachectic, elderly  female in no acute distress  HENT: normocephalic, atraumatic  Eye: PERRL, EOMI, clear conjunctiva  Neck: full ROM, no thyromegaly  Respiratory: diminished breath sounds bilaterally  Cardiovascular: regular rate and rhythm  Gastrointestinal: non-distended, positive bowel sounds, non-tender  Musculoskeletal: generalized muscular atrophy  Neurological: cranial nerves grossly intact, no focal neurological deficit  Psychiatric: cooperative, demented      VITAL SIGNS: 24 HRS MIN & MAX LAST   Temp  Min: 95.5 °F (35.3 °C)  Max: 97.3 °F (36.3 °C) 96.2 °F (35.7 °C)   BP  Min: 100/65  Max: 137/73 120/77   Pulse  Min: 57  Max: 66  60   Resp  Min: 17  Max: 18 17   SpO2  Min: 95 %  Max: 99 % 99 %     I have reviewed the following labs:  Recent Labs   Lab 10/11/23  0454   WBC 2.91*   RBC 3.13*   HGB 9.4*   HCT 28.7*   MCV 91.7   MCH 30.0   MCHC 32.8*   RDW 14.9   PLT 92*   MPV 11.4*     Recent Labs   Lab 10/07/23  0344 10/11/23  0454    139   K 5.0 3.9   CO2 24 30   BUN 16.3 14.9   CREATININE 0.92 0.81   CALCIUM 8.0* 8.2*   MG  --  2.10   ALBUMIN 2.6*  --    ALKPHOS 75  --    ALT 8  --    AST 26  --    BILITOT 0.3  --      Microbiology Results (last 7 days)       ** No results found for the last 168 hours. **             See below for Radiology    Scheduled Med:   B12-levomefolate calcium-B6  1 tablet Oral Daily    carvediloL  3.125 mg Oral BID WM    divalproex ER  500 mg Oral Nightly    donepeziL  5 mg Oral QHS    enoxparin  40 mg Subcutaneous Daily    mupirocin   Nasal BID    polyethylene glycol  17 g Oral Daily    risperiDONE  0.5 mg Oral After lunch    risperiDONE  1 mg Oral After dinner    sacubitriL-valsartan  1 tablet Oral BID    senna-docusate 8.6-50 mg  1 tablet Oral BID    tamsulosin  0.4 mg Oral Daily      Continuous Infusions:  None.       PRN Meds:  acetaminophen, aluminum-magnesium hydroxide-simethicone, bisacodyL, cloNIDine,  hydrALAZINE, melatonin, naloxone, ondansetron, polyethylene glycol, prochlorperazine, simethicone     Assessment/Plan:  Failure to thrive   Progressive dementia with behavioral disturbance  MRSA posterior right scalp wound, completed treatment  Essential hypertension   Anemia of chronic disease   Cardiomyopathy  Vitamin B12 deficiency        Plan:  Continue fall precautions, physical therapy, occupational therapy, appropriate home medications, and other supportive care while awaiting definitive placement      VTE prophylaxis:  Lovenox    Patient condition:  Stable    Anticipated discharge and Disposition:   [facility]      All diagnosis and differential diagnosis have been reviewed; assessment and plan has been documented; I have personally reviewed the labs and test results that are presently available; I have reviewed the patients medication list; I have reviewed the consulting providers response and recommendations. I have reviewed or attempted to review medical records based upon their availability    All of the patient's questions have been  addressed and answered. Patient's is agreeable to the above stated plan. I will continue to monitor closely and make adjustments to medical management as needed.  _____________________________________________________________________      Radiology:  I have personally reviewed the following imaging and agree with the radiologist.     CT Cervical Spine Without Contrast  EXAMINATION  CT CERVICAL SPINE WITHOUT CONTRAST    CLINICAL HISTORY  Neck trauma (Age >= 65y);    TECHNIQUE  Non-contrast helical-acquisition CT images of the cervical spine were obtained and multiplanar reformats accomplished by a CT technologist at a separate workstation, pushed to PACS for physician review.    COMPARISON  6 September 2023    FINDINGS  Images were reviewed in bone, soft tissue, and lung windows.    Exam quality: adequate for evaluation    Visualized levels: Skull base through  T2.    No newly identified acute cortical displacement or other findings to suggest traumatic cervical spine injury.  Degenerative changes throughout the visualized spinal column are similar.    No prevertebral soft tissue swelling.  Paraspinal musculature and more superficial soft tissues are without acute finding.  Low-density nodule of the right hemithyroid unchanged.    IMPRESSION  1. No convincing acute cervical spine abnormality.  2. Chronic findings without gross change from the exam a few weeks prior.  ==========    Please note ligament, spinal cord, and/or vascular abnormalities cannot be excluded on the basis of this examination.  Additional imaging recommended if there is elevated clinical concern for high-grade soft tissue injury.    RADIATION DOSE  Automated tube current modulation, weight-based exposure dosing, and/or iterative reconstruction technique utilized to reach lowest reasonably achievable exposure rate.    DLP: 1427 mGy*cm    Electronically signed by: Errol Whitten  Date:    09/29/2023  Time:    21:12  CT Head Without Contrast  EXAMINATION  CT HEAD WITHOUT CONTRAST    CLINICAL HISTORY  Head trauma, moderate-severe;    TECHNIQUE  Axial non-contrast CT images of the head were acquired and multiplanar reconstructions accomplished by a CT technologist at a separate workstation, pushed to PACS for physician review.    COMPARISON  6 September 2023    FINDINGS  Images were reviewed in subdural, brain, soft tissue, and bone windows.    Exam quality: Motion/streak artifact limits assessment of the posterior fossa.    Hemorrhage: No evidence of acute hyperattenuating blood products.    Parenchyma: There is diffuse bilateral supratentorial white matter hypoattenuation, typical of chronic microvascular changes. No discrete mass, mass effect, or CT evidence of acute large vascular territory insult. Gray-white differentiation is preserved.    Midline shift: None.    CSF spaces: Proportional appearance of  ventricular and sulcal enlargement. No hydrocephalus. No masses or fluid collections.    Vasculature: No focally hyperdense artery. Scattered carotid siphon calcifications are present. No abnormal densities within the dural sinuses.    Other findings: Findings suggestive of large right posterior scalp hematoma unchanged in comparison.  No new abnormality of the extracranial soft tissues identified.  There is no depressed skull fracture.  Mastoids are well aerated. No focal abnormality of the sella. The included facial structures are unremarkable.    IMPRESSION  1. No new or worsened intracranial abnormality.  2. Redemonstrated posterior right parietal scalp hematoma without depressed skull fracture.  3. Chronic findings above.    RADIATION DOSE  Automated tube current modulation, weight-based exposure dosing, and/or iterative reconstruction technique utilized to reach lowest reasonably achievable exposure rate.    DLP: 1427 mGy*cm    Electronically signed by: Errol Whitten  Date:    09/29/2023  Time:    21:10      Candido Talley MD   10/12/2023

## 2023-10-12 NOTE — PT/OT/SLP PROGRESS
Physical Therapy Treatment    Patient Name:  Danielle Loera   MRN:  [id number]    Recommendations:     Discharge Recommendations: nursing facility, basic  Discharge Equipment Recommendations: to be determined by next level of care  Barriers to discharge: Impaired mobility and Ongoing medical needs    Assessment:     Danielle Loera is a 89 y.o. female admitted with a medical diagnosis of Frequent falls.  She presents with the following impairments/functional limitations: weakness, impaired endurance, gait instability, impaired cognition, decreased safety awareness, impaired functional mobility, impaired cardiopulmonary response to activity, decreased lower extremity function, impaired self care skills.    Rehab Prognosis: Good; patient would benefit from acute skilled PT services to address these deficits and reach maximum level of function.    Recent Surgery: * No surgery found *      Plan:     During this hospitalization, patient to be seen 3 x/week to address the identified rehab impairments via therapeutic activities and progress toward the following goals:    Plan of Care Expires:  10/31/23    Subjective     Chief Complaint:  Patient/Family Comments/goals:   Pain/Comfort:         Objective:     Communicated with nurse prior to session.  Patient found up in chair with pulse ox (continuous), bed alarm, peripheral IV upon PT entry to room.     General Precautions: Standard, contact, fall  Orthopedic Precautions: N/A  Braces: N/A  Respiratory Status: Room air  Blood Pressure:   Skin Integrity: Visible skin intact      Functional Mobility:  Bed Mobility:     Supine to Sit: moderate assistance  Sit to Supine: maximal assistance    Pt with increased lethargy this AM requiring increased cues to stay awake and participate.     Education:  Patient provided with verbal education education regarding importance of functional mobility.  Understanding was verbalized, however additional teaching warranted.     Patient  left HOB elevated with all lines intact, call button in reach, and bed alarm on..    GOALS:   Multidisciplinary Problems       Physical Therapy Goals          Problem: Physical Therapy    Goal Priority Disciplines Outcome Goal Variances Interventions   Physical Therapy Goal     PT, PT/OT Ongoing, Progressing     Description: Goals to be met by: 10/31/23     Patient will increase functional independence with mobility by performin. Supine to sit with Modified [place]  2. Sit to stand transfer with Modified [place]  3. Gait  x 200 feet with Modified [place] using Rolling Walker.                          Time Tracking:     PT Received On: 10/12/23  PT Start Time: 142     PT Stop Time: 1442  PT Total Time (min): 16 min     Billable Minutes: Therapeutic Activity 16    Treatment Type: Treatment  PT/PTA: PTA     Number of PTA visits since last PT visit: 2     10/12/2023

## 2023-10-12 NOTE — PLAN OF CARE
RODY spoke to Carolina from [facility]. Santos son Soy will be going to see them tomorrow with financial paperwork.

## 2023-10-13 PROCEDURE — 97530 THERAPEUTIC ACTIVITIES: CPT | Mod: CQ

## 2023-10-13 PROCEDURE — 25000003 PHARM REV CODE 250: Performed by: NURSE PRACTITIONER

## 2023-10-13 PROCEDURE — 25000003 PHARM REV CODE 250: Performed by: INTERNAL MEDICINE

## 2023-10-13 PROCEDURE — 11000001 HC ACUTE MED/SURG PRIVATE ROOM

## 2023-10-13 PROCEDURE — 97110 THERAPEUTIC EXERCISES: CPT | Mod: CQ

## 2023-10-13 RX ADMIN — DIVALPROEX SODIUM 500 MG: 500 TABLET, FILM COATED, EXTENDED RELEASE ORAL at 09:10

## 2023-10-13 RX ADMIN — DONEPEZIL HYDROCHLORIDE 5 MG: 5 TABLET, FILM COATED ORAL at 09:10

## 2023-10-13 RX ADMIN — SACUBITRIL AND VALSARTAN 1 TABLET: 24; 26 TABLET, FILM COATED ORAL at 09:10

## 2023-10-13 RX ADMIN — CARVEDILOL 3.12 MG: 3.12 TABLET, FILM COATED ORAL at 09:10

## 2023-10-13 RX ADMIN — MUPIROCIN: 20 OINTMENT TOPICAL at 09:10

## 2023-10-13 RX ADMIN — SENNOSIDES AND DOCUSATE SODIUM 1 TABLET: 50; 8.6 TABLET ORAL at 09:10

## 2023-10-13 RX ADMIN — Medication 6 MG: at 09:10

## 2023-10-13 NOTE — PROGRESS NOTES
Inpatient Nutrition Assessment    Admit Date: 9/29/2023   Total duration of encounter: 14 days     Nutrition Recommendation/Prescription     Continue current diet as tolerated  Continue Boost Plus BID (provides 360 kcal and 14 g protein per container)  Encouraged adequate PO intake  Monitor appetite/PO intake, weight, and labs   Consider adding appetite stimulant if medically appropriate    Communication of Recommendations: reviewed with nurse    Nutrition Assessment     Malnutrition Assessment/Nutrition-Focused Physical Exam    Malnutrition Context: other (see comments) (Does not meet criteria at this time) (10/03/23 1455)  Malnutrition Level: other (see comments) (Does not meet criteria at this time) (10/03/23 1455)  Energy Intake (Malnutrition): other (see comments) (Unable to assess) (10/03/23 1455)  Weight Loss (Malnutrition): greater than 5% in 1 month (10/03/23 1455)  Subcutaneous Fat (Malnutrition): other (see comments) (Does not meet criteria) (10/03/23 1455)           Muscle Mass (Malnutrition): other (see comments) (Does not meet criteria) (10/03/23 1455)                          Fluid Accumulation (Malnutrition): other (see comments) (Does not meet criteria) (10/03/23 1455)        A minimum of two characteristics is recommended for diagnosis of either severe or non-severe malnutrition.    Chart Review    Reason Seen: continuous nutrition monitoring and follow-up wound    Malnutrition Screening Tool Results   Have you recently lost weight without trying?: No  Have you been eating poorly because of a decreased appetite?: No   MST Score: 0   Diagnosis:  Acute encephalopathy in the setting of dementia  Recent closed head injury from fall, resulting in hematoma with confusion  MRSA positive scalp wound cultures  Dementia with behavioral disturbances    Relevant Medical History:   HTN  CKD stage III  Dementia  Anemia  Vitamin D deficiency    Nutrition-Related Medications:   Scheduled Meds:   B12-levomefolate  calcium-B6  1 tablet Oral Daily    carvediloL  3.125 mg Oral BID WM    divalproex ER  500 mg Oral Nightly    donepeziL  5 mg Oral QHS    enoxparin  40 mg Subcutaneous Daily    mupirocin   Nasal BID    polyethylene glycol  17 g Oral Daily    risperiDONE  0.5 mg Oral After lunch    risperiDONE  1 mg Oral After dinner    sacubitriL-valsartan  1 tablet Oral BID    senna-docusate 8.6-50 mg  1 tablet Oral BID    tamsulosin  0.4 mg Oral Daily     Continuous Infusions:  PRN Meds:.acetaminophen, aluminum-magnesium hydroxide-simethicone, bisacodyL, cloNIDine, hydrALAZINE, melatonin, naloxone, ondansetron, polyethylene glycol, prochlorperazine, simethicone    Calorie Containing IV Medications: no significant kcals from medications at this time    Nutrition-Related Labs:  10/3/2023: Gluc 94  10/6/2023: no new labs  10/11: Ca 8.2  10/13: no updated    Diet/PN Order: Diet heart healthy  Oral Supplement Order: Boost Plus  Tube Feeding Order: none  Appetite/Oral Intake: poor/0-25% of meals  Factors Affecting Nutritional Intake: decreased appetite  Food/Voodoo/Cultural Preferences: unable to obtain  Food Allergies: no known food allergies    Wound(s):      Altered Skin Integrity 10/02/23 posterior Head Other (comment)-Tissue loss description: Full thickness noted    Last Bowel Movement: 10/08/23    Comments    10/3/2023: Pt unable to answer questions clearly. Per tray observation, pt has <25% PO intake of breakfast. Pt reported not being hungry. Will add Boost Plus BID as preventative MNT. Encouraged adequate PO intake. No reported N/V. Last BM documented as 9/29/2023. Per EMR, pt weighed 71.7 kg on 9/6/2023 (9.5% wt loss in 1 month, significant). Will monitor.    10/6/2023: Spoke with nurse.The nurse reports pt refused breakfast, presumed poor appetite. The nurse denies N/V/D/C. Last BM noted. Boost Plus ordered. Encouraged adequate PO intake.    10/11 pt sleeping, ate about 25% of lunch per nursing, drinking some boost    10/13  "pt sleeping, nurse reports pt not eating much, not drinking the boost today either; discussed adding an appetite stimulant with nurse; says pt is usually more awake but she has been started on a new medication that is making her tired    Anthropometrics    Height: 5' 7" (170.2 cm) Height Method: Stated  Last Weight: 64.9 kg (143 lb) (23) Weight Method: Standard Scale  BMI (Calculated): 22.4  BMI Classification: normal (BMI 18.5-24.9)     Ideal Body Weight (IBW), Female: 135 lb     % Ideal Body Weight, Female (lb): 105.93 %                    Usual Body Weight (UBW), k.7 kg  % Usual Body Weight: 90.66     Usual Weight Provided By: EMR weight history    Wt Readings from Last 5 Encounters:   23 64.9 kg (143 lb)   23 71.7 kg (158 lb)   23 71.7 kg (158 lb)   23 75.3 kg (166 lb)   23 69 kg (152 lb 1.9 oz)     Weight Change(s) Since Admission:  Admit Weight: 64.9 kg (143 lb) (23)  2023: 64.9 kg  10/6/2023: no new wts    Estimated Needs    Weight Used For Calorie Calculations: 64.9 kg (143 lb 1.3 oz)  Energy Calorie Requirements (kcal): 0555-8834 (25-30 kcal/kg)  Energy Need Method: Kcal/kg  Weight Used For Protein Calculations: 64.9 kg (143 lb 1.3 oz)  Protein Requirements: 65-78 (1.0-1.2 g/kg)  Fluid Requirements (mL): 1622 (1 mL/kcal)  Temp (24hrs), Av.5 °F (36.4 °C), Min:96.9 °F (36.1 °C), Max:97.9 °F (36.6 °C)       Enteral Nutrition    Patient not receiving enteral nutrition at this time.    Parenteral Nutrition    Patient not receiving parenteral nutrition support at this time.    Evaluation of Received Nutrient Intake    Calories: not meeting estimated needs  Protein: not meeting estimated needs    Patient Education    Not applicable.    Nutrition Diagnosis     PES: Inadequate oral intake related to acute illness as evidenced by poor PO intake since admit. (continues)    Interventions/Goals     Intervention(s): general/healthful diet and commercial " beverage  Goal: Meet greater than 75% of nutritional needs by follow-up. (goal not met)    Monitoring & Evaluation     Dietitian will monitor food and beverage intake, weight, electrolyte/renal panel, glucose/endocrine profile, and gastrointestinal profile.  Nutrition Risk/Follow-Up: high (follow-up in 1-4 days)   Please consult if re-assessment needed sooner.

## 2023-10-13 NOTE — PT/OT/SLP PROGRESS
Physical Therapy Treatment    Patient Name:  Danielle Loera   MRN:  [id number]    Recommendations:     Discharge Recommendations:  nursing facility, basic   Discharge Equipment Recommendations: to be determined by next level of care     Subjective     Patient awake and alert.     Objective:     General Precautions: Standard, contact, fall   Orthopedic Precautions:N/A   Braces:    Respiratory Status: Room air  Communicated with nurse prior to treatment.     Functional Mobility:    Rolling:Maximum Assistance  Supine to sit:Maximum Assistance  Sit to stand transfer: Activity did not occur  Bed to chair transfer:Activity did not occur    Max assist to get pt EOB and max assist for sit balance. Pt resistant to sitting and pushing back most of the time. Pt rec'd PROM to BLE's supine and wedge and boot donned to prevent breakdown.     Education Provided:  Role and goals of PT, transfer training, bed mobility, gait training, balance training, safety awareness, assistive device, strengthening exercises, and importance of participating in PT to return to PLOF.          Skin Integrity: Visible skin intact    PT interventions performed during the course of today's session in an effort to prevent and/or reduce acquired pressure injuries:   Therapeutic positioning completed       GOALS:   Multidisciplinary Problems       Physical Therapy Goals          Problem: Physical Therapy    Goal Priority Disciplines Outcome Goal Variances Interventions   Physical Therapy Goal     PT, PT/OT Ongoing, Progressing     Description: Goals to be met by: 10/31/23     Patient will increase functional independence with mobility by performin. Supine to sit with Modified [place]  2. Sit to stand transfer with Modified [place]  3. Gait  x 200 feet with Modified [place] using Rolling Walker.                          Assessment:     Danielle Loera is a 89 y.o. female admitted with a medical diagnosis of Frequent falls.      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    Recent Surgery: * No surgery found *      Plan:     During this hospitalization, patient to be seen 3 x/week to address the identified rehab impairments via therapeutic activities and progress toward the following goals:    Plan of Care Expires:  10/31/23    Billable Minutes     Billable Minutes: Therapeutic Activity 13 and Therapeutic Exercise 10    Treatment Type: Treatment  PT/PTA: PTA     Number of PTA visits since last PT visit: 3     10/13/2023

## 2023-10-13 NOTE — PROGRESS NOTES
[facility] Hospital Medicine Progress Note        Chief Complaint: Inpatient follow-up on failure to thrive    HPI:   Danielle Loera is a 89 y.o. female with  PMHx of  HTN, CKD stage III, dementia, anemia;, anxiety;  presents to the ED s/p fall out of bed at home with associated head injury. It was reported per the family pt climbed out of bed and fell hitting the back of her head. Pt was recently admitted to our services last month when she had a fall at that itme and discharged to  rehab and was reportedly discharged from rehab the day of arrival in the ED. No reports of LOC, no reports of CP, SOB, N/V/D or fever per family reports.      Initial VS /75 P 76 R 16 T 99 F O2 saturation 98% on room air.  Labs reviewed demonstrated HH 9.2/28.2, BUN/Creat 22.8/1.09. CT of the head without contrast showed no new or worsened intracranial abnormality. Re-demonstrated posterior right parietal scalp hematoma without depressed skull fracture. CT of the cervical spine without contrast no convincing acute cervical spine abnormality. Pt is pleasantly confused and can not provide accurate historical information. Pt received zyprexa  in the ED. Family is desiring NH placement. Pt is admitted to  services for further management.      Pt is noted to have serosanguinous discharge form altered skin integrity located occipital scalp. Wound care was consulted and culture obtained.  Wound cultures grew MRSA and doxy was added.  Seroquel was continued to help with hospital-acquired delirium. Later changed to risperidone and additionally Depakote was added for behavior control related to dementia with good result. Home meds are reviewed and resumed as appropriate. PT/OT consulted and SNF placement suggested. CM consulted to assist with DC planning.      Interval Hx:   Patient is resting comfortably with no new issues reported.  Patient is afebrile, on room air, and hemodynamically stable.  Patient  has a friend visiting in the room.      Objective/physical exam:  General:  Cachectic, elderly  female in no acute distress  HENT: normocephalic, atraumatic  Eye: PERRL, EOMI, clear conjunctiva  Neck: full ROM, no thyromegaly  Respiratory: diminished breath sounds bilaterally  Cardiovascular: regular rate and rhythm  Gastrointestinal: non-distended, positive bowel sounds, non-tender  Musculoskeletal: generalized muscular atrophy  Neurological: cranial nerves grossly intact, no focal neurological deficit  Psychiatric: cooperative, demented      VITAL SIGNS: 24 HRS MIN & MAX LAST   Temp  Min: 96.9 °F (36.1 °C)  Max: 97.9 °F (36.6 °C) 97.6 °F (36.4 °C)   BP  Min: 85/52  Max: 143/85 (!) 114/52   Pulse  Min: 63  Max: 70  63   Resp  Min: 16  Max: 18 16   SpO2  Min: 98 %  Max: 100 % 98 %     I have reviewed the following labs:  Recent Labs   Lab 10/11/23  0454   WBC 2.91*   RBC 3.13*   HGB 9.4*   HCT 28.7*   MCV 91.7   MCH 30.0   MCHC 32.8*   RDW 14.9   PLT 92*   MPV 11.4*     Recent Labs   Lab 10/07/23  0344 10/11/23  0454    139   K 5.0 3.9   CO2 24 30   BUN 16.3 14.9   CREATININE 0.92 0.81   CALCIUM 8.0* 8.2*   MG  --  2.10   ALBUMIN 2.6*  --    ALKPHOS 75  --    ALT 8  --    AST 26  --    BILITOT 0.3  --      Microbiology Results (last 7 days)       ** No results found for the last 168 hours. **             See below for Radiology    Scheduled Med:   B12-levomefolate calcium-B6  1 tablet Oral Daily    carvediloL  3.125 mg Oral BID WM    divalproex ER  500 mg Oral Nightly    donepeziL  5 mg Oral QHS    enoxparin  40 mg Subcutaneous Daily    mupirocin   Nasal BID    polyethylene glycol  17 g Oral Daily    risperiDONE  0.5 mg Oral After lunch    risperiDONE  1 mg Oral After dinner    sacubitriL-valsartan  1 tablet Oral BID    senna-docusate 8.6-50 mg  1 tablet Oral BID    tamsulosin  0.4 mg Oral Daily      Continuous Infusions:  None.       PRN Meds:  acetaminophen, aluminum-magnesium  hydroxide-simethicone, bisacodyL, cloNIDine, hydrALAZINE, melatonin, naloxone, ondansetron, polyethylene glycol, prochlorperazine, simethicone     Assessment/Plan:  Failure to thrive   Progressive dementia with behavioral disturbance  MRSA posterior right scalp wound, completed treatment  Essential hypertension   Anemia of chronic disease   Cardiomyopathy  Vitamin B12 deficiency        Plan:  Continue fall precautions, physical therapy, occupational therapy, appropriate home medications, and other supportive care while awaiting definitive placement      VTE prophylaxis:  Lovenox    Patient condition:  Stable    Anticipated discharge and Disposition:   [facility]      All diagnosis and differential diagnosis have been reviewed; assessment and plan has been documented; I have personally reviewed the labs and test results that are presently available; I have reviewed the patients medication list; I have reviewed the consulting providers response and recommendations. I have reviewed or attempted to review medical records based upon their availability    All of the patient's questions have been  addressed and answered. Patient's is agreeable to the above stated plan. I will continue to monitor closely and make adjustments to medical management as needed.  _____________________________________________________________________      Radiology:  I have personally reviewed the following imaging and agree with the radiologist.     CT Cervical Spine Without Contrast  EXAMINATION  CT CERVICAL SPINE WITHOUT CONTRAST    CLINICAL HISTORY  Neck trauma (Age >= 65y);    TECHNIQUE  Non-contrast helical-acquisition CT images of the cervical spine were obtained and multiplanar reformats accomplished by a CT technologist at a separate workstation, pushed to PACS for physician review.    COMPARISON  6 September 2023    FINDINGS  Images were reviewed in bone, soft tissue, and lung windows.    Exam quality:  adequate for evaluation    Visualized levels: Skull base through T2.    No newly identified acute cortical displacement or other findings to suggest traumatic cervical spine injury.  Degenerative changes throughout the visualized spinal column are similar.    No prevertebral soft tissue swelling.  Paraspinal musculature and more superficial soft tissues are without acute finding.  Low-density nodule of the right hemithyroid unchanged.    IMPRESSION  1. No convincing acute cervical spine abnormality.  2. Chronic findings without gross change from the exam a few weeks prior.  ==========    Please note ligament, spinal cord, and/or vascular abnormalities cannot be excluded on the basis of this examination.  Additional imaging recommended if there is elevated clinical concern for high-grade soft tissue injury.    RADIATION DOSE  Automated tube current modulation, weight-based exposure dosing, and/or iterative reconstruction technique utilized to reach lowest reasonably achievable exposure rate.    DLP: 1427 mGy*cm    Electronically signed by: Errol Whitten  Date:    09/29/2023  Time:    21:12  CT Head Without Contrast  EXAMINATION  CT HEAD WITHOUT CONTRAST    CLINICAL HISTORY  Head trauma, moderate-severe;    TECHNIQUE  Axial non-contrast CT images of the head were acquired and multiplanar reconstructions accomplished by a CT technologist at a separate workstation, pushed to PACS for physician review.    COMPARISON  6 September 2023    FINDINGS  Images were reviewed in subdural, brain, soft tissue, and bone windows.    Exam quality: Motion/streak artifact limits assessment of the posterior fossa.    Hemorrhage: No evidence of acute hyperattenuating blood products.    Parenchyma: There is diffuse bilateral supratentorial white matter hypoattenuation, typical of chronic microvascular changes. No discrete mass, mass effect, or CT evidence of acute large vascular territory insult. Gray-white differentiation is  preserved.    Midline shift: None.    CSF spaces: Proportional appearance of ventricular and sulcal enlargement. No hydrocephalus. No masses or fluid collections.    Vasculature: No focally hyperdense artery. Scattered carotid siphon calcifications are present. No abnormal densities within the dural sinuses.    Other findings: Findings suggestive of large right posterior scalp hematoma unchanged in comparison.  No new abnormality of the extracranial soft tissues identified.  There is no depressed skull fracture.  Mastoids are well aerated. No focal abnormality of the sella. The included facial structures are unremarkable.    IMPRESSION  1. No new or worsened intracranial abnormality.  2. Redemonstrated posterior right parietal scalp hematoma without depressed skull fracture.  3. Chronic findings above.    RADIATION DOSE  Automated tube current modulation, weight-based exposure dosing, and/or iterative reconstruction technique utilized to reach lowest reasonably achievable exposure rate.    DLP: 1427 mGy*cm    Electronically signed by: Errol Whitten  Date:    09/29/2023  Time:    21:10      Candido Talley MD   10/13/2023

## 2023-10-14 PROCEDURE — 25000003 PHARM REV CODE 250: Performed by: INTERNAL MEDICINE

## 2023-10-14 PROCEDURE — 63600175 PHARM REV CODE 636 W HCPCS: Performed by: NURSE PRACTITIONER

## 2023-10-14 PROCEDURE — 25000003 PHARM REV CODE 250: Performed by: NURSE PRACTITIONER

## 2023-10-14 PROCEDURE — 11000001 HC ACUTE MED/SURG PRIVATE ROOM

## 2023-10-14 RX ADMIN — SENNOSIDES AND DOCUSATE SODIUM 1 TABLET: 50; 8.6 TABLET ORAL at 08:10

## 2023-10-14 RX ADMIN — SACUBITRIL AND VALSARTAN 1 TABLET: 24; 26 TABLET, FILM COATED ORAL at 10:10

## 2023-10-14 RX ADMIN — POLYETHYLENE GLYCOL 3350 17 G: 17 POWDER, FOR SOLUTION ORAL at 10:10

## 2023-10-14 RX ADMIN — CARVEDILOL 3.12 MG: 3.12 TABLET, FILM COATED ORAL at 10:10

## 2023-10-14 RX ADMIN — TAMSULOSIN HYDROCHLORIDE 0.4 MG: 0.4 CAPSULE ORAL at 10:10

## 2023-10-14 RX ADMIN — SENNOSIDES AND DOCUSATE SODIUM 1 TABLET: 50; 8.6 TABLET ORAL at 10:10

## 2023-10-14 RX ADMIN — DONEPEZIL HYDROCHLORIDE 5 MG: 5 TABLET, FILM COATED ORAL at 08:10

## 2023-10-14 RX ADMIN — Medication 1 TABLET: at 10:10

## 2023-10-14 RX ADMIN — ENOXAPARIN SODIUM 40 MG: 40 INJECTION SUBCUTANEOUS at 05:10

## 2023-10-14 RX ADMIN — DIVALPROEX SODIUM 500 MG: 500 TABLET, FILM COATED, EXTENDED RELEASE ORAL at 08:10

## 2023-10-14 RX ADMIN — SACUBITRIL AND VALSARTAN 1 TABLET: 24; 26 TABLET, FILM COATED ORAL at 08:10

## 2023-10-14 RX ADMIN — MUPIROCIN: 20 OINTMENT TOPICAL at 10:10

## 2023-10-14 NOTE — PROGRESS NOTES
[facility] Hospital Medicine Progress Note        Chief Complaint: Inpatient follow-up on failure to thrive    HPI:   Danielle Loera is a 89 y.o. female with  PMHx of  HTN, CKD stage III, dementia, anemia;, anxiety;  presents to the ED s/p fall out of bed at home with associated head injury. It was reported per the family pt climbed out of bed and fell hitting the back of her head. Pt was recently admitted to our services last month when she had a fall at that itme and discharged to  rehab and was reportedly discharged from rehab the day of arrival in the ED. No reports of LOC, no reports of CP, SOB, N/V/D or fever per family reports.      Initial VS /75 P 76 R 16 T 99 F O2 saturation 98% on room air.  Labs reviewed demonstrated HH 9.2/28.2, BUN/Creat 22.8/1.09. CT of the head without contrast showed no new or worsened intracranial abnormality. Re-demonstrated posterior right parietal scalp hematoma without depressed skull fracture. CT of the cervical spine without contrast no convincing acute cervical spine abnormality. Pt is pleasantly confused and can not provide accurate historical information. Pt received zyprexa  in the ED. Family is desiring NH placement. Pt is admitted to  services for further management.      Pt is noted to have serosanguinous discharge form altered skin integrity located occipital scalp. Wound care was consulted and culture obtained.  Wound cultures grew MRSA and doxy was added.  Seroquel was continued to help with hospital-acquired delirium. Later changed to risperidone and additionally Depakote was added for behavior control related to dementia with good result. Home meds are reviewed and resumed as appropriate. PT/OT consulted and SNF placement suggested. CM consulted to assist with DC planning.      Interval Hx:   Patient is sleeping soundly with no new issues reported.  Patient is afebrile, on room air, and hemodynamically stable.  There are  no visitors present today.    Objective/physical exam:  General:  Cachectic, elderly  female in no acute distress  HENT: normocephalic, atraumatic  Eye: PERRL, EOMI, clear conjunctiva  Neck: full ROM, no thyromegaly  Respiratory: diminished breath sounds bilaterally  Cardiovascular: regular rate and rhythm  Gastrointestinal: non-distended, positive bowel sounds, non-tender  Musculoskeletal: generalized muscular atrophy  Neurological: cranial nerves grossly intact, no focal neurological deficit  Psychiatric: cooperative, demented      VITAL SIGNS: 24 HRS MIN & MAX LAST   Temp  Min: 97.7 °F (36.5 °C)  Max: 98.1 °F (36.7 °C) 97.8 °F (36.6 °C)   BP  Min: 123/61  Max: 139/82 139/82   Pulse  Min: 67  Max: 85  67   Resp  Min: 18  Max: 18 18   SpO2  Min: 97 %  Max: 98 % 98 %     I have reviewed the following labs:  Recent Labs   Lab 10/11/23  0454   WBC 2.91*   RBC 3.13*   HGB 9.4*   HCT 28.7*   MCV 91.7   MCH 30.0   MCHC 32.8*   RDW 14.9   PLT 92*   MPV 11.4*     Recent Labs   Lab 10/11/23  0454      K 3.9   CO2 30   BUN 14.9   CREATININE 0.81   CALCIUM 8.2*   MG 2.10     Microbiology Results (last 7 days)       ** No results found for the last 168 hours. **             See below for Radiology    Scheduled Med:   B12-levomefolate calcium-B6  1 tablet Oral Daily    carvediloL  3.125 mg Oral BID WM    divalproex ER  500 mg Oral Nightly    donepeziL  5 mg Oral QHS    enoxparin  40 mg Subcutaneous Daily    mupirocin   Nasal BID    polyethylene glycol  17 g Oral Daily    risperiDONE  0.5 mg Oral After lunch    risperiDONE  1 mg Oral After dinner    sacubitriL-valsartan  1 tablet Oral BID    senna-docusate 8.6-50 mg  1 tablet Oral BID    tamsulosin  0.4 mg Oral Daily      Continuous Infusions:  None.       PRN Meds:  acetaminophen, aluminum-magnesium hydroxide-simethicone, bisacodyL, cloNIDine, hydrALAZINE, melatonin, naloxone, ondansetron, polyethylene glycol, prochlorperazine, simethicone      Assessment/Plan:  Failure to thrive   Progressive dementia with behavioral disturbance  MRSA posterior right scalp wound, completed treatment  Essential hypertension   Anemia of chronic disease   Cardiomyopathy  Vitamin B12 deficiency        Plan:  Continue fall precautions, physical therapy, occupational therapy, appropriate home medications, and other supportive care while awaiting definitive placement      VTE prophylaxis:  Lovenox    Patient condition:  Stable    Anticipated discharge and Disposition:   [facility]      All diagnosis and differential diagnosis have been reviewed; assessment and plan has been documented; I have personally reviewed the labs and test results that are presently available; I have reviewed the patients medication list; I have reviewed the consulting providers response and recommendations. I have reviewed or attempted to review medical records based upon their availability    All of the patient's questions have been  addressed and answered. Patient's is agreeable to the above stated plan. I will continue to monitor closely and make adjustments to medical management as needed.  _____________________________________________________________________      Radiology:  I have personally reviewed the following imaging and agree with the radiologist.     CT Cervical Spine Without Contrast  EXAMINATION  CT CERVICAL SPINE WITHOUT CONTRAST    CLINICAL HISTORY  Neck trauma (Age >= 65y);    TECHNIQUE  Non-contrast helical-acquisition CT images of the cervical spine were obtained and multiplanar reformats accomplished by a CT technologist at a separate workstation, pushed to PACS for physician review.    COMPARISON  6 September 2023    FINDINGS  Images were reviewed in bone, soft tissue, and lung windows.    Exam quality: adequate for evaluation    Visualized levels: Skull base through T2.    No newly identified acute cortical displacement or other findings to  suggest traumatic cervical spine injury.  Degenerative changes throughout the visualized spinal column are similar.    No prevertebral soft tissue swelling.  Paraspinal musculature and more superficial soft tissues are without acute finding.  Low-density nodule of the right hemithyroid unchanged.    IMPRESSION  1. No convincing acute cervical spine abnormality.  2. Chronic findings without gross change from the exam a few weeks prior.  ==========    Please note ligament, spinal cord, and/or vascular abnormalities cannot be excluded on the basis of this examination.  Additional imaging recommended if there is elevated clinical concern for high-grade soft tissue injury.    RADIATION DOSE  Automated tube current modulation, weight-based exposure dosing, and/or iterative reconstruction technique utilized to reach lowest reasonably achievable exposure rate.    DLP: 1427 mGy*cm    Electronically signed by: Errol Whitten  Date:    09/29/2023  Time:    21:12  CT Head Without Contrast  EXAMINATION  CT HEAD WITHOUT CONTRAST    CLINICAL HISTORY  Head trauma, moderate-severe;    TECHNIQUE  Axial non-contrast CT images of the head were acquired and multiplanar reconstructions accomplished by a CT technologist at a separate workstation, pushed to PACS for physician review.    COMPARISON  6 September 2023    FINDINGS  Images were reviewed in subdural, brain, soft tissue, and bone windows.    Exam quality: Motion/streak artifact limits assessment of the posterior fossa.    Hemorrhage: No evidence of acute hyperattenuating blood products.    Parenchyma: There is diffuse bilateral supratentorial white matter hypoattenuation, typical of chronic microvascular changes. No discrete mass, mass effect, or CT evidence of acute large vascular territory insult. Gray-white differentiation is preserved.    Midline shift: None.    CSF spaces: Proportional appearance of ventricular and sulcal enlargement. No hydrocephalus. No masses or fluid  collections.    Vasculature: No focally hyperdense artery. Scattered carotid siphon calcifications are present. No abnormal densities within the dural sinuses.    Other findings: Findings suggestive of large right posterior scalp hematoma unchanged in comparison.  No new abnormality of the extracranial soft tissues identified.  There is no depressed skull fracture.  Mastoids are well aerated. No focal abnormality of the sella. The included facial structures are unremarkable.    IMPRESSION  1. No new or worsened intracranial abnormality.  2. Redemonstrated posterior right parietal scalp hematoma without depressed skull fracture.  3. Chronic findings above.    RADIATION DOSE  Automated tube current modulation, weight-based exposure dosing, and/or iterative reconstruction technique utilized to reach lowest reasonably achievable exposure rate.    DLP: 1427 mGy*cm    Electronically signed by: Errol Whitten  Date:    09/29/2023  Time:    21:10      Candido Talley MD   10/14/2023    DISPLAY PLAN FREE TEXT

## 2023-10-15 PROCEDURE — 63600175 PHARM REV CODE 636 W HCPCS: Performed by: NURSE PRACTITIONER

## 2023-10-15 PROCEDURE — 25000003 PHARM REV CODE 250: Performed by: INTERNAL MEDICINE

## 2023-10-15 PROCEDURE — 11000001 HC ACUTE MED/SURG PRIVATE ROOM

## 2023-10-15 PROCEDURE — 25000003 PHARM REV CODE 250: Performed by: NURSE PRACTITIONER

## 2023-10-15 RX ADMIN — RISPERIDONE 0.5 MG: 0.25 TABLET, FILM COATED ORAL at 12:10

## 2023-10-15 RX ADMIN — SACUBITRIL AND VALSARTAN 1 TABLET: 24; 26 TABLET, FILM COATED ORAL at 08:10

## 2023-10-15 RX ADMIN — ENOXAPARIN SODIUM 40 MG: 40 INJECTION SUBCUTANEOUS at 05:10

## 2023-10-15 RX ADMIN — BISACODYL 10 MG: 10 SUPPOSITORY RECTAL at 11:10

## 2023-10-15 RX ADMIN — POLYETHYLENE GLYCOL 3350 17 G: 17 POWDER, FOR SOLUTION ORAL at 08:10

## 2023-10-15 RX ADMIN — Medication 1 TABLET: at 08:10

## 2023-10-15 RX ADMIN — SENNOSIDES AND DOCUSATE SODIUM 1 TABLET: 50; 8.6 TABLET ORAL at 08:10

## 2023-10-15 RX ADMIN — TAMSULOSIN HYDROCHLORIDE 0.4 MG: 0.4 CAPSULE ORAL at 08:10

## 2023-10-15 RX ADMIN — DIVALPROEX SODIUM 500 MG: 500 TABLET, FILM COATED, EXTENDED RELEASE ORAL at 08:10

## 2023-10-15 RX ADMIN — CARVEDILOL 3.12 MG: 3.12 TABLET, FILM COATED ORAL at 05:10

## 2023-10-15 RX ADMIN — RISPERIDONE 1 MG: 1 TABLET ORAL at 05:10

## 2023-10-15 RX ADMIN — CARVEDILOL 3.12 MG: 3.12 TABLET, FILM COATED ORAL at 08:10

## 2023-10-15 RX ADMIN — DONEPEZIL HYDROCHLORIDE 5 MG: 5 TABLET, FILM COATED ORAL at 08:10

## 2023-10-15 NOTE — PROGRESS NOTES
[facility] Hospital Medicine Progress Note        Chief Complaint: Inpatient follow-up on failure to thrive    HPI:   Danielle Loera is a 89 y.o. female with  PMHx of  HTN, CKD stage III, dementia, anemia;, anxiety;  presents to the ED s/p fall out of bed at home with associated head injury. It was reported per the family pt climbed out of bed and fell hitting the back of her head. Pt was recently admitted to our services last month when she had a fall at that itme and discharged to  rehab and was reportedly discharged from rehab the day of arrival in the ED. No reports of LOC, no reports of CP, SOB, N/V/D or fever per family reports.      Initial VS /75 P 76 R 16 T 99 F O2 saturation 98% on room air.  Labs reviewed demonstrated HH 9.2/28.2, BUN/Creat 22.8/1.09. CT of the head without contrast showed no new or worsened intracranial abnormality. Re-demonstrated posterior right parietal scalp hematoma without depressed skull fracture. CT of the cervical spine without contrast no convincing acute cervical spine abnormality. Pt is pleasantly confused and can not provide accurate historical information. Pt received zyprexa  in the ED. Family is desiring NH placement. Pt is admitted to  services for further management.      Pt is noted to have serosanguinous discharge form altered skin integrity located occipital scalp. Wound care was consulted and culture obtained.  Wound cultures grew MRSA and doxy was added.  Seroquel was continued to help with hospital-acquired delirium. Later changed to risperidone and additionally Depakote was added for behavior control related to dementia with good result. Home meds are reviewed and resumed as appropriate. PT/OT consulted and SNF placement suggested. CM consulted to assist with DC planning.      Interval Hx:   Patient is sleeping soundly with no new issues reported.  Patient is afebrile, on room air, and hemodynamically stable.  There are  no visitors present today.    Objective/physical exam:  General:  Cachectic, elderly  female in no acute distress  HENT: normocephalic, atraumatic  Eye: PERRL, EOMI, clear conjunctiva  Neck: full ROM, no thyromegaly  Respiratory: diminished breath sounds bilaterally  Cardiovascular: regular rate and rhythm  Gastrointestinal: non-distended, positive bowel sounds, non-tender  Musculoskeletal: generalized muscular atrophy  Neurological: cranial nerves grossly intact, no focal neurological deficit  Psychiatric: cooperative, demented      VITAL SIGNS: 24 HRS MIN & MAX LAST   Temp  Min: 97.4 °F (36.3 °C)  Max: 99.8 °F (37.7 °C) 99.8 °F (37.7 °C)   BP  Min: 126/73  Max: 151/65 133/83   Pulse  Min: 69  Max: 83  70   Resp  Min: 18  Max: 18 18   SpO2  Min: 91 %  Max: 98 % 97 %     I have reviewed the following labs:  Recent Labs   Lab 10/11/23  0454   WBC 2.91*   RBC 3.13*   HGB 9.4*   HCT 28.7*   MCV 91.7   MCH 30.0   MCHC 32.8*   RDW 14.9   PLT 92*   MPV 11.4*     Recent Labs   Lab 10/11/23  0454      K 3.9   CO2 30   BUN 14.9   CREATININE 0.81   CALCIUM 8.2*   MG 2.10     Microbiology Results (last 7 days)       ** No results found for the last 168 hours. **             See below for Radiology    Scheduled Med:   B12-levomefolate calcium-B6  1 tablet Oral Daily    carvediloL  3.125 mg Oral BID WM    divalproex ER  500 mg Oral Nightly    donepeziL  5 mg Oral QHS    enoxparin  40 mg Subcutaneous Daily    polyethylene glycol  17 g Oral Daily    risperiDONE  0.5 mg Oral After lunch    risperiDONE  1 mg Oral After dinner    sacubitriL-valsartan  1 tablet Oral BID    senna-docusate 8.6-50 mg  1 tablet Oral BID    tamsulosin  0.4 mg Oral Daily      Continuous Infusions:  None.       PRN Meds:  acetaminophen, aluminum-magnesium hydroxide-simethicone, bisacodyL, cloNIDine, hydrALAZINE, melatonin, naloxone, ondansetron, polyethylene glycol, prochlorperazine, simethicone     Assessment/Plan:  Failure to thrive    Progressive dementia with behavioral disturbance  MRSA posterior right scalp wound, completed treatment  Essential hypertension   Anemia of chronic disease   Cardiomyopathy  Vitamin B12 deficiency        Plan:  Continue fall precautions, physical therapy, occupational therapy, appropriate home medications, and other supportive care while awaiting definitive placement      VTE prophylaxis:  Lovenox    Patient condition:  Stable    Anticipated discharge and Disposition:   [facility]      All diagnosis and differential diagnosis have been reviewed; assessment and plan has been documented; I have personally reviewed the labs and test results that are presently available; I have reviewed the patients medication list; I have reviewed the consulting providers response and recommendations. I have reviewed or attempted to review medical records based upon their availability    All of the patient's questions have been  addressed and answered. Patient's is agreeable to the above stated plan. I will continue to monitor closely and make adjustments to medical management as needed.  _____________________________________________________________________      Radiology:  I have personally reviewed the following imaging and agree with the radiologist.     CT Cervical Spine Without Contrast  EXAMINATION  CT CERVICAL SPINE WITHOUT CONTRAST    CLINICAL HISTORY  Neck trauma (Age >= 65y);    TECHNIQUE  Non-contrast helical-acquisition CT images of the cervical spine were obtained and multiplanar reformats accomplished by a CT technologist at a separate workstation, pushed to PACS for physician review.    COMPARISON  6 September 2023    FINDINGS  Images were reviewed in bone, soft tissue, and lung windows.    Exam quality: adequate for evaluation    Visualized levels: Skull base through T2.    No newly identified acute cortical displacement or other findings to suggest traumatic cervical spine injury.   Degenerative changes throughout the visualized spinal column are similar.    No prevertebral soft tissue swelling.  Paraspinal musculature and more superficial soft tissues are without acute finding.  Low-density nodule of the right hemithyroid unchanged.    IMPRESSION  1. No convincing acute cervical spine abnormality.  2. Chronic findings without gross change from the exam a few weeks prior.  ==========    Please note ligament, spinal cord, and/or vascular abnormalities cannot be excluded on the basis of this examination.  Additional imaging recommended if there is elevated clinical concern for high-grade soft tissue injury.    RADIATION DOSE  Automated tube current modulation, weight-based exposure dosing, and/or iterative reconstruction technique utilized to reach lowest reasonably achievable exposure rate.    DLP: 1427 mGy*cm    Electronically signed by: Errol Whitten  Date:    09/29/2023  Time:    21:12  CT Head Without Contrast  EXAMINATION  CT HEAD WITHOUT CONTRAST    CLINICAL HISTORY  Head trauma, moderate-severe;    TECHNIQUE  Axial non-contrast CT images of the head were acquired and multiplanar reconstructions accomplished by a CT technologist at a separate workstation, pushed to PACS for physician review.    COMPARISON  6 September 2023    FINDINGS  Images were reviewed in subdural, brain, soft tissue, and bone windows.    Exam quality: Motion/streak artifact limits assessment of the posterior fossa.    Hemorrhage: No evidence of acute hyperattenuating blood products.    Parenchyma: There is diffuse bilateral supratentorial white matter hypoattenuation, typical of chronic microvascular changes. No discrete mass, mass effect, or CT evidence of acute large vascular territory insult. Gray-white differentiation is preserved.    Midline shift: None.    CSF spaces: Proportional appearance of ventricular and sulcal enlargement. No hydrocephalus. No masses or fluid collections.    Vasculature: No focally  hyperdense artery. Scattered carotid siphon calcifications are present. No abnormal densities within the dural sinuses.    Other findings: Findings suggestive of large right posterior scalp hematoma unchanged in comparison.  No new abnormality of the extracranial soft tissues identified.  There is no depressed skull fracture.  Mastoids are well aerated. No focal abnormality of the sella. The included facial structures are unremarkable.    IMPRESSION  1. No new or worsened intracranial abnormality.  2. Redemonstrated posterior right parietal scalp hematoma without depressed skull fracture.  3. Chronic findings above.    RADIATION DOSE  Automated tube current modulation, weight-based exposure dosing, and/or iterative reconstruction technique utilized to reach lowest reasonably achievable exposure rate.    DLP: 1427 mGy*cm    Electronically signed by: Errol Whitten  Date:    09/29/2023  Time:    21:10      Candido Talley MD   10/15/2023

## 2023-10-16 PROCEDURE — 25000003 PHARM REV CODE 250: Performed by: INTERNAL MEDICINE

## 2023-10-16 PROCEDURE — 11000001 HC ACUTE MED/SURG PRIVATE ROOM

## 2023-10-16 PROCEDURE — 63600175 PHARM REV CODE 636 W HCPCS: Performed by: NURSE PRACTITIONER

## 2023-10-16 PROCEDURE — 25000003 PHARM REV CODE 250: Performed by: NURSE PRACTITIONER

## 2023-10-16 RX ADMIN — TAMSULOSIN HYDROCHLORIDE 0.4 MG: 0.4 CAPSULE ORAL at 09:10

## 2023-10-16 RX ADMIN — DIVALPROEX SODIUM 500 MG: 500 TABLET, FILM COATED, EXTENDED RELEASE ORAL at 08:10

## 2023-10-16 RX ADMIN — SENNOSIDES AND DOCUSATE SODIUM 1 TABLET: 50; 8.6 TABLET ORAL at 09:10

## 2023-10-16 RX ADMIN — Medication 1 TABLET: at 09:10

## 2023-10-16 RX ADMIN — DONEPEZIL HYDROCHLORIDE 5 MG: 5 TABLET, FILM COATED ORAL at 08:10

## 2023-10-16 RX ADMIN — Medication 6 MG: at 08:10

## 2023-10-16 RX ADMIN — SENNOSIDES AND DOCUSATE SODIUM 1 TABLET: 50; 8.6 TABLET ORAL at 08:10

## 2023-10-16 RX ADMIN — ENOXAPARIN SODIUM 40 MG: 40 INJECTION SUBCUTANEOUS at 06:10

## 2023-10-16 RX ADMIN — POLYETHYLENE GLYCOL 3350 17 G: 17 POWDER, FOR SOLUTION ORAL at 09:10

## 2023-10-16 NOTE — PROGRESS NOTES
[facility] Hospital Medicine Progress Note        Chief Complaint: Inpatient follow-up on failure to thrive    HPI:   Danielle Loera is a 89 y.o. female with  PMHx of  HTN, CKD stage III, dementia, anemia;, anxiety;  presents to the ED s/p fall out of bed at home with associated head injury. It was reported per the family pt climbed out of bed and fell hitting the back of her head. Pt was recently admitted to our services last month when she had a fall at that itme and discharged to  rehab and was reportedly discharged from rehab the day of arrival in the ED. No reports of LOC, no reports of CP, SOB, N/V/D or fever per family reports.      Initial VS /75 P 76 R 16 T 99 F O2 saturation 98% on room air.  Labs reviewed demonstrated HH 9.2/28.2, BUN/Creat 22.8/1.09. CT of the head without contrast showed no new or worsened intracranial abnormality. Re-demonstrated posterior right parietal scalp hematoma without depressed skull fracture. CT of the cervical spine without contrast no convincing acute cervical spine abnormality. Pt is pleasantly confused and can not provide accurate historical information. Pt received zyprexa  in the ED. Family is desiring NH placement. Pt is admitted to  services for further management.      Pt is noted to have serosanguinous discharge form altered skin integrity located occipital scalp. Wound care was consulted and culture obtained.  Wound cultures grew MRSA and doxy was added.  Seroquel was continued to help with hospital-acquired delirium. Later changed to risperidone and additionally Depakote was added for behavior control related to dementia with good result. Home meds are reviewed and resumed as appropriate. PT/OT consulted and SNF placement suggested. CM consulted to assist with DC planning.      Interval Hx:   Patient is sitting up in bed eating breakfast with no new issues reported.  Patient is afebrile, on room air, and hemodynamically  stable.  There are no visitors present today.    Objective/physical exam:  General:  Cachectic, elderly  female in no acute distress  HENT: normocephalic, atraumatic  Eye: PERRL, EOMI, clear conjunctiva  Neck: full ROM, no thyromegaly  Respiratory: diminished breath sounds bilaterally  Cardiovascular: regular rate and rhythm  Gastrointestinal: non-distended, positive bowel sounds, non-tender  Musculoskeletal: generalized muscular atrophy  Neurological: cranial nerves grossly intact, no focal neurological deficit  Psychiatric: cooperative, demented      VITAL SIGNS: 24 HRS MIN & MAX LAST   Temp  Min: 97.6 °F (36.4 °C)  Max: 98.4 °F (36.9 °C) 98.1 °F (36.7 °C)   BP  Min: 111/65  Max: 129/77 111/65   Pulse  Min: 52  Max: 70  66   Resp  Min: 18  Max: 18 18   SpO2  Min: 94 %  Max: 98 % 98 %     I have reviewed the following labs:  Recent Labs   Lab 10/11/23  0454   WBC 2.91*   RBC 3.13*   HGB 9.4*   HCT 28.7*   MCV 91.7   MCH 30.0   MCHC 32.8*   RDW 14.9   PLT 92*   MPV 11.4*     Recent Labs   Lab 10/11/23  0454      K 3.9   CO2 30   BUN 14.9   CREATININE 0.81   CALCIUM 8.2*   MG 2.10     Microbiology Results (last 7 days)       ** No results found for the last 168 hours. **             See below for Radiology    Scheduled Med:   B12-levomefolate calcium-B6  1 tablet Oral Daily    carvediloL  3.125 mg Oral BID WM    divalproex ER  500 mg Oral Nightly    donepeziL  5 mg Oral QHS    enoxparin  40 mg Subcutaneous Daily    polyethylene glycol  17 g Oral Daily    risperiDONE  0.5 mg Oral After lunch    risperiDONE  1 mg Oral After dinner    sacubitriL-valsartan  1 tablet Oral BID    senna-docusate 8.6-50 mg  1 tablet Oral BID    tamsulosin  0.4 mg Oral Daily      Continuous Infusions:  None.       PRN Meds:  acetaminophen, aluminum-magnesium hydroxide-simethicone, bisacodyL, cloNIDine, hydrALAZINE, melatonin, naloxone, ondansetron, polyethylene glycol, prochlorperazine, simethicone      Assessment/Plan:  Failure to thrive   Progressive dementia with behavioral disturbance  MRSA posterior right scalp wound, completed treatment  Essential hypertension   Anemia of chronic disease   Cardiomyopathy  Vitamin B12 deficiency        Plan:  Continue fall precautions, physical therapy, occupational therapy, appropriate home medications, and other supportive care while awaiting definitive placement      VTE prophylaxis:  Lovenox    Patient condition:  Stable    Anticipated discharge and Disposition:   [facility]      All diagnosis and differential diagnosis have been reviewed; assessment and plan has been documented; I have personally reviewed the labs and test results that are presently available; I have reviewed the patients medication list; I have reviewed the consulting providers response and recommendations. I have reviewed or attempted to review medical records based upon their availability    All of the patient's questions have been  addressed and answered. Patient's is agreeable to the above stated plan. I will continue to monitor closely and make adjustments to medical management as needed.  _____________________________________________________________________      Radiology:  I have personally reviewed the following imaging and agree with the radiologist.     CT Cervical Spine Without Contrast  EXAMINATION  CT CERVICAL SPINE WITHOUT CONTRAST    CLINICAL HISTORY  Neck trauma (Age >= 65y);    TECHNIQUE  Non-contrast helical-acquisition CT images of the cervical spine were obtained and multiplanar reformats accomplished by a CT technologist at a separate workstation, pushed to PACS for physician review.    COMPARISON  6 September 2023    FINDINGS  Images were reviewed in bone, soft tissue, and lung windows.    Exam quality: adequate for evaluation    Visualized levels: Skull base through T2.    No newly identified acute cortical displacement or other findings to  suggest traumatic cervical spine injury.  Degenerative changes throughout the visualized spinal column are similar.    No prevertebral soft tissue swelling.  Paraspinal musculature and more superficial soft tissues are without acute finding.  Low-density nodule of the right hemithyroid unchanged.    IMPRESSION  1. No convincing acute cervical spine abnormality.  2. Chronic findings without gross change from the exam a few weeks prior.  ==========    Please note ligament, spinal cord, and/or vascular abnormalities cannot be excluded on the basis of this examination.  Additional imaging recommended if there is elevated clinical concern for high-grade soft tissue injury.    RADIATION DOSE  Automated tube current modulation, weight-based exposure dosing, and/or iterative reconstruction technique utilized to reach lowest reasonably achievable exposure rate.    DLP: 1427 mGy*cm    Electronically signed by: Errol Whitten  Date:    09/29/2023  Time:    21:12  CT Head Without Contrast  EXAMINATION  CT HEAD WITHOUT CONTRAST    CLINICAL HISTORY  Head trauma, moderate-severe;    TECHNIQUE  Axial non-contrast CT images of the head were acquired and multiplanar reconstructions accomplished by a CT technologist at a separate workstation, pushed to PACS for physician review.    COMPARISON  6 September 2023    FINDINGS  Images were reviewed in subdural, brain, soft tissue, and bone windows.    Exam quality: Motion/streak artifact limits assessment of the posterior fossa.    Hemorrhage: No evidence of acute hyperattenuating blood products.    Parenchyma: There is diffuse bilateral supratentorial white matter hypoattenuation, typical of chronic microvascular changes. No discrete mass, mass effect, or CT evidence of acute large vascular territory insult. Gray-white differentiation is preserved.    Midline shift: None.    CSF spaces: Proportional appearance of ventricular and sulcal enlargement. No hydrocephalus. No masses or fluid  collections.    Vasculature: No focally hyperdense artery. Scattered carotid siphon calcifications are present. No abnormal densities within the dural sinuses.    Other findings: Findings suggestive of large right posterior scalp hematoma unchanged in comparison.  No new abnormality of the extracranial soft tissues identified.  There is no depressed skull fracture.  Mastoids are well aerated. No focal abnormality of the sella. The included facial structures are unremarkable.    IMPRESSION  1. No new or worsened intracranial abnormality.  2. Redemonstrated posterior right parietal scalp hematoma without depressed skull fracture.  3. Chronic findings above.    RADIATION DOSE  Automated tube current modulation, weight-based exposure dosing, and/or iterative reconstruction technique utilized to reach lowest reasonably achievable exposure rate.    DLP: 1427 mGy*cm    Electronically signed by: Errol Whitten  Date:    09/29/2023  Time:    21:10      Candido Talley MD   10/16/2023

## 2023-10-17 PROCEDURE — 25000003 PHARM REV CODE 250: Performed by: NURSE PRACTITIONER

## 2023-10-17 PROCEDURE — 25000003 PHARM REV CODE 250: Performed by: INTERNAL MEDICINE

## 2023-10-17 PROCEDURE — 63600175 PHARM REV CODE 636 W HCPCS: Performed by: NURSE PRACTITIONER

## 2023-10-17 PROCEDURE — 11000001 HC ACUTE MED/SURG PRIVATE ROOM

## 2023-10-17 RX ORDER — ADHESIVE BANDAGE
30 BANDAGE TOPICAL ONCE
Status: COMPLETED | OUTPATIENT
Start: 2023-10-17 | End: 2023-10-17

## 2023-10-17 RX ORDER — BISACODYL 5 MG
20 TABLET, DELAYED RELEASE (ENTERIC COATED) ORAL ONCE
Status: COMPLETED | OUTPATIENT
Start: 2023-10-17 | End: 2023-10-17

## 2023-10-17 RX ADMIN — CARVEDILOL 3.12 MG: 3.12 TABLET, FILM COATED ORAL at 08:10

## 2023-10-17 RX ADMIN — DIVALPROEX SODIUM 500 MG: 500 TABLET, FILM COATED, EXTENDED RELEASE ORAL at 10:10

## 2023-10-17 RX ADMIN — SENNOSIDES AND DOCUSATE SODIUM 1 TABLET: 50; 8.6 TABLET ORAL at 10:10

## 2023-10-17 RX ADMIN — ENOXAPARIN SODIUM 40 MG: 40 INJECTION SUBCUTANEOUS at 06:10

## 2023-10-17 RX ADMIN — POLYETHYLENE GLYCOL 3350 17 G: 17 POWDER, FOR SOLUTION ORAL at 08:10

## 2023-10-17 RX ADMIN — DONEPEZIL HYDROCHLORIDE 5 MG: 5 TABLET, FILM COATED ORAL at 10:10

## 2023-10-17 RX ADMIN — CARVEDILOL 3.12 MG: 3.12 TABLET, FILM COATED ORAL at 06:10

## 2023-10-17 RX ADMIN — SENNOSIDES AND DOCUSATE SODIUM 1 TABLET: 50; 8.6 TABLET ORAL at 08:10

## 2023-10-17 RX ADMIN — SACUBITRIL AND VALSARTAN 1 TABLET: 24; 26 TABLET, FILM COATED ORAL at 08:10

## 2023-10-17 RX ADMIN — BISACODYL 20 MG: 5 TABLET, COATED ORAL at 10:10

## 2023-10-17 RX ADMIN — TAMSULOSIN HYDROCHLORIDE 0.4 MG: 0.4 CAPSULE ORAL at 08:10

## 2023-10-17 RX ADMIN — MAGNESIUM HYDROXIDE 2400 MG: 400 SUSPENSION ORAL at 10:10

## 2023-10-17 RX ADMIN — SACUBITRIL AND VALSARTAN 1 TABLET: 24; 26 TABLET, FILM COATED ORAL at 10:10

## 2023-10-17 RX ADMIN — Medication 1 TABLET: at 08:10

## 2023-10-17 NOTE — PROGRESS NOTES
[facility] Hospital Medicine Progress Note        Chief Complaint: Inpatient follow-up on failure to thrive    HPI:   Danielle Loera is a 89 y.o. female with  PMHx of  HTN, CKD stage III, dementia, anemia;, anxiety;  presents to the ED s/p fall out of bed at home with associated head injury. It was reported per the family pt climbed out of bed and fell hitting the back of her head. Pt was recently admitted to our services last month when she had a fall at that itme and discharged to  rehab and was reportedly discharged from rehab the day of arrival in the ED. No reports of LOC, no reports of CP, SOB, N/V/D or fever per family reports.      Initial VS /75 P 76 R 16 T 99 F O2 saturation 98% on room air.  Labs reviewed demonstrated HH 9.2/28.2, BUN/Creat 22.8/1.09. CT of the head without contrast showed no new or worsened intracranial abnormality. Re-demonstrated posterior right parietal scalp hematoma without depressed skull fracture. CT of the cervical spine without contrast no convincing acute cervical spine abnormality. Pt is pleasantly confused and can not provide accurate historical information. Pt received zyprexa  in the ED. Family is desiring NH placement. Pt is admitted to  services for further management.      Pt is noted to have serosanguinous discharge form altered skin integrity located occipital scalp. Wound care was consulted and culture obtained.  Wound cultures grew MRSA and doxy was added.  Seroquel was continued to help with hospital-acquired delirium. Later changed to risperidone and additionally Depakote was added for behavior control related to dementia with good result. Home meds are reviewed and resumed as appropriate. PT/OT consulted and SNF placement suggested. CM consulted to assist with DC planning.      Interval Hx:   Patient is sitting up in bed awake and alert.  Nursing staff reports constipation otherwise no new issues reported.  Patient is  afebrile, on room air, and hemodynamically stable.  There are no visitors present today.    Objective/physical exam:  General:  Cachectic, elderly  female in no acute distress  HENT: normocephalic, atraumatic  Eye: PERRL, EOMI, clear conjunctiva  Neck: full ROM, no thyromegaly  Respiratory: diminished breath sounds bilaterally  Cardiovascular: regular rate and rhythm  Gastrointestinal: non-distended, positive bowel sounds, non-tender  Musculoskeletal: generalized muscular atrophy  Neurological: cranial nerves grossly intact, no focal neurological deficit  Psychiatric: cooperative, demented      VITAL SIGNS: 24 HRS MIN & MAX LAST   Temp  Min: 97.7 °F (36.5 °C)  Max: 98.4 °F (36.9 °C) 98.1 °F (36.7 °C)   BP  Min: 100/62  Max: 123/69 100/62   Pulse  Min: 64  Max: 73  72   Resp  Min: 17  Max: 20 17   SpO2  Min: 95 %  Max: 98 % 98 %     I have reviewed the following labs:  Recent Labs   Lab 10/11/23  0454   WBC 2.91*   RBC 3.13*   HGB 9.4*   HCT 28.7*   MCV 91.7   MCH 30.0   MCHC 32.8*   RDW 14.9   PLT 92*   MPV 11.4*     Recent Labs   Lab 10/11/23  0454      K 3.9   CO2 30   BUN 14.9   CREATININE 0.81   CALCIUM 8.2*   MG 2.10     Microbiology Results (last 7 days)       ** No results found for the last 168 hours. **             See below for Radiology    Scheduled Med:   B12-levomefolate calcium-B6  1 tablet Oral Daily    carvediloL  3.125 mg Oral BID WM    divalproex ER  500 mg Oral Nightly    donepeziL  5 mg Oral QHS    enoxparin  40 mg Subcutaneous Daily    polyethylene glycol  17 g Oral Daily    risperiDONE  0.5 mg Oral After lunch    risperiDONE  1 mg Oral After dinner    sacubitriL-valsartan  1 tablet Oral BID    senna-docusate 8.6-50 mg  1 tablet Oral BID    tamsulosin  0.4 mg Oral Daily      Continuous Infusions:  None.       PRN Meds:  acetaminophen, aluminum-magnesium hydroxide-simethicone, bisacodyL, cloNIDine, hydrALAZINE, melatonin, naloxone, ondansetron, polyethylene glycol,  prochlorperazine, simethicone     Assessment/Plan:  Failure to thrive   Progressive dementia with behavioral disturbance  MRSA posterior right scalp wound, completed treatment  Essential hypertension   Anemia of chronic disease   Cardiomyopathy  Vitamin B12 deficiency  Constipation      Plan:  Bowel regimen  Continue fall precautions, physical therapy, occupational therapy, appropriate home medications, and other supportive care while awaiting definitive placement      VTE prophylaxis:  Lovenox    Patient condition:  Stable    Anticipated discharge and Disposition:   [facility]      All diagnosis and differential diagnosis have been reviewed; assessment and plan has been documented; I have personally reviewed the labs and test results that are presently available; I have reviewed the patients medication list; I have reviewed the consulting providers response and recommendations. I have reviewed or attempted to review medical records based upon their availability    All of the patient's questions have been  addressed and answered. Patient's is agreeable to the above stated plan. I will continue to monitor closely and make adjustments to medical management as needed.  _____________________________________________________________________      Radiology:  I have personally reviewed the following imaging and agree with the radiologist.     CT Cervical Spine Without Contrast  EXAMINATION  CT CERVICAL SPINE WITHOUT CONTRAST    CLINICAL HISTORY  Neck trauma (Age >= 65y);    TECHNIQUE  Non-contrast helical-acquisition CT images of the cervical spine were obtained and multiplanar reformats accomplished by a CT technologist at a separate workstation, pushed to PACS for physician review.    COMPARISON  6 September 2023    FINDINGS  Images were reviewed in bone, soft tissue, and lung windows.    Exam quality: adequate for evaluation    Visualized levels: Skull base through T2.    No newly  identified acute cortical displacement or other findings to suggest traumatic cervical spine injury.  Degenerative changes throughout the visualized spinal column are similar.    No prevertebral soft tissue swelling.  Paraspinal musculature and more superficial soft tissues are without acute finding.  Low-density nodule of the right hemithyroid unchanged.    IMPRESSION  1. No convincing acute cervical spine abnormality.  2. Chronic findings without gross change from the exam a few weeks prior.  ==========    Please note ligament, spinal cord, and/or vascular abnormalities cannot be excluded on the basis of this examination.  Additional imaging recommended if there is elevated clinical concern for high-grade soft tissue injury.    RADIATION DOSE  Automated tube current modulation, weight-based exposure dosing, and/or iterative reconstruction technique utilized to reach lowest reasonably achievable exposure rate.    DLP: 1427 mGy*cm    Electronically signed by: Errol Whitten  Date:    09/29/2023  Time:    21:12  CT Head Without Contrast  EXAMINATION  CT HEAD WITHOUT CONTRAST    CLINICAL HISTORY  Head trauma, moderate-severe;    TECHNIQUE  Axial non-contrast CT images of the head were acquired and multiplanar reconstructions accomplished by a CT technologist at a separate workstation, pushed to PACS for physician review.    COMPARISON  6 September 2023    FINDINGS  Images were reviewed in subdural, brain, soft tissue, and bone windows.    Exam quality: Motion/streak artifact limits assessment of the posterior fossa.    Hemorrhage: No evidence of acute hyperattenuating blood products.    Parenchyma: There is diffuse bilateral supratentorial white matter hypoattenuation, typical of chronic microvascular changes. No discrete mass, mass effect, or CT evidence of acute large vascular territory insult. Gray-white differentiation is preserved.    Midline shift: None.    CSF spaces: Proportional appearance of ventricular and  sulcal enlargement. No hydrocephalus. No masses or fluid collections.    Vasculature: No focally hyperdense artery. Scattered carotid siphon calcifications are present. No abnormal densities within the dural sinuses.    Other findings: Findings suggestive of large right posterior scalp hematoma unchanged in comparison.  No new abnormality of the extracranial soft tissues identified.  There is no depressed skull fracture.  Mastoids are well aerated. No focal abnormality of the sella. The included facial structures are unremarkable.    IMPRESSION  1. No new or worsened intracranial abnormality.  2. Redemonstrated posterior right parietal scalp hematoma without depressed skull fracture.  3. Chronic findings above.    RADIATION DOSE  Automated tube current modulation, weight-based exposure dosing, and/or iterative reconstruction technique utilized to reach lowest reasonably achievable exposure rate.    DLP: 1427 mGy*cm    Electronically signed by: Errol Whitten  Date:    09/29/2023  Time:    21:10      Candido Talley MD   10/17/2023

## 2023-10-17 NOTE — PT/OT/SLP PROGRESS
Occupational Therapy      Patient Name:  Danielle Loera   MRN:  [id number]    Patient not seen today secondary to patient lethargic. Will follow-up as appropriate.    10/17/2023

## 2023-10-17 NOTE — PLAN OF CARE
Spoke to patient's son Soy, he is working on paying out of pocket for 2 months skilled nursing placement at [facility] due to patient's assets.

## 2023-10-17 NOTE — PROGRESS NOTES
[facility] - 9th Floor Med Surg  Wound Care    Patient Name:  Danielle Loera   MRN:  [id number]  Date: 10/17/2023  Diagnosis: Frequent falls    History:     Past Medical History:   Diagnosis Date    Anemia due to stage 3a chronic kidney disease 5/12/2023    Primary hypertension 5/12/2023    Severe dementia without behavioral disturbance, psychotic disturbance, mood disturbance, or anxiety 2/23/2023    Vitamin D deficiency 5/12/2023       Social History     Socioeconomic History    Marital status:     Number of children: 4   Occupational History    Occupation: retied   Tobacco Use    Smoking status: Never     Passive exposure: Never    Smokeless tobacco: Never   Substance and Sexual Activity    Alcohol use: Never    Drug use: Never    Sexual activity: Not Currently     Social Determinants of Health     Financial Resource Strain: Low Risk  (8/2/2023)    Overall Financial Resource Strain (CARDIA)     Difficulty of Paying Living Expenses: Not hard at all   Food Insecurity: No Food Insecurity (8/2/2023)    Hunger Vital Sign     Worried About Running Out of Food in the Last Year: Never true     Ran Out of Food in the Last Year: Never true   Transportation Needs: No Transportation Needs (8/2/2023)    PRAPARE - Transportation     Lack of Transportation (Medical): No     Lack of Transportation (Non-Medical): No   Physical Activity: Insufficiently Active (8/2/2023)    Exercise Vital Sign     Days of Exercise per Week: 1 day     Minutes of Exercise per Session: 10 min   Stress: No Stress Concern Present (8/2/2023)    [place] [employer] [employer] - Occupational Stress Questionnaire     Feeling of Stress : Not at all   Social Connections: Moderately Isolated (8/2/2023)    Social Connection and Isolation Panel [NHANES]     Frequency of Communication with Friends and Family: More than three times a week     Frequency of Social Gatherings with Friends and Family: More than three times a week      Attends [employer] Services: More than 4 times per year     Active Member of Clubs or Organizations: No     Attends Club or Organization Meetings: Never     Marital Status:    Housing Stability: Low Risk  (8/2/2023)    Housing Stability Vital Sign     Unable to Pay for Housing in the Last Year: No     Number of Places Lived in the Last Year: 1     Unstable Housing in the Last Year: No       Precautions:     Allergies as of 09/29/2023    (No Known Allergies)       WO Assessment Details/Treatment        10/17/23 1040   WOCN Assessment   Visit Date 10/17/23   Visit Time 1040   Consult Type Follow Up   WOCN Speciality Wound   Intervention assessed;applied;coordination of care   Teaching on-going   Skin Interventions   Device Skin Pressure Protection absorbent pad utilized/changed        Altered Skin Integrity 10/02/23 posterior Head Other (comment)   Date First Assessed: 10/02/23   Altered Skin Integrity Present on Admission - Did Patient arrive to the hospital with altered skin?: yes  Orientation: posterior  Location: Head  Primary Wound Type: (c) Other (comment)   Wound Image    Dressing Appearance Open to air   Drainage Amount Scant   Drainage Characteristics/Odor Serosanguineous   Appearance Red;Pink;Yellow   Tissue loss description Full thickness   Red (%), Wound Tissue Color 100 %   Periwound Area Dry   Wound Edges Defined   Wound Length (cm) 1 cm   Wound Width (cm) 1.5 cm   Wound Depth (cm) 0.5 cm   Wound Volume (cm^3) 0.75 cm^3   Wound Surface Area (cm^2) 1.5 cm^2   Undermining (depth (cm)/location) 1.5 cm circumferential   Care Cleansed with:;Sterile normal saline   Packing packed with;gauze, iodoform     WOCN follow up for occiput I&D. Hair matted to wound bed. Trimmed back to expose wound. Continue with current treatment recommendations put into place.  Occiput head: Cleanse with NS. Pack with 1/4 inch iodoform. Cover foam dressing or medipore tape. Change daily. Nursing to continue with preventative  measures. Will follow up.     10/17/2023

## 2023-10-17 NOTE — PROGRESS NOTES
Inpatient Nutrition Assessment    Admit Date: 9/29/2023   Total duration of encounter: 18 days     Nutrition Recommendation/Prescription     Continue current diet as tolerated  Continue Boost Plus BID (provides 360 kcal and 14 g protein per container)  Encouraged adequate PO intake  Monitor appetite/PO intake, weight, and labs   Consider adding appetite stimulant if intake of meals <50% consistently    Communication of Recommendations: reviewed with nurse    Nutrition Assessment     Malnutrition Assessment/Nutrition-Focused Physical Exam    Malnutrition Context: other (see comments) (Does not meet criteria at this time) (10/03/23 1455)  Malnutrition Level: other (see comments) (Does not meet criteria at this time) (10/03/23 1455)  Energy Intake (Malnutrition): other (see comments) (Unable to assess) (10/03/23 1455)  Weight Loss (Malnutrition): greater than 5% in 1 month (10/03/23 1455)  Subcutaneous Fat (Malnutrition): other (see comments) (Does not meet criteria) (10/03/23 1455)           Muscle Mass (Malnutrition): other (see comments) (Does not meet criteria) (10/03/23 1455)                          Fluid Accumulation (Malnutrition): other (see comments) (Does not meet criteria) (10/03/23 1455)        A minimum of two characteristics is recommended for diagnosis of either severe or non-severe malnutrition.    Chart Review    Reason Seen: continuous nutrition monitoring and follow-up wound    Malnutrition Screening Tool Results   Have you recently lost weight without trying?: No  Have you been eating poorly because of a decreased appetite?: No   MST Score: 0   Diagnosis:  Acute encephalopathy in the setting of dementia  Recent closed head injury from fall, resulting in hematoma with confusion  MRSA positive scalp wound cultures  Dementia with behavioral disturbances    Relevant Medical History:   HTN  CKD stage III  Dementia  Anemia  Vitamin D deficiency    Nutrition-Related Medications:   Scheduled Meds:    B12-levomefolate calcium-B6  1 tablet Oral Daily    carvediloL  3.125 mg Oral BID WM    divalproex ER  500 mg Oral Nightly    donepeziL  5 mg Oral QHS    enoxparin  40 mg Subcutaneous Daily    polyethylene glycol  17 g Oral Daily    risperiDONE  0.5 mg Oral After lunch    risperiDONE  1 mg Oral After dinner    sacubitriL-valsartan  1 tablet Oral BID    senna-docusate 8.6-50 mg  1 tablet Oral BID    tamsulosin  0.4 mg Oral Daily     Continuous Infusions:  PRN Meds:.acetaminophen, aluminum-magnesium hydroxide-simethicone, bisacodyL, cloNIDine, hydrALAZINE, melatonin, naloxone, ondansetron, polyethylene glycol, prochlorperazine, simethicone    Calorie Containing IV Medications: no significant kcals from medications at this time    Nutrition-Related Labs:  10/3/2023: Gluc 94  10/6/2023: no new labs  10/11: Ca 8.2  10/13: no updated    Diet/PN Order: Diet heart healthy  Oral Supplement Order: Boost Plus  Tube Feeding Order: none  Appetite/Oral Intake: fair/25-50% of meals  Factors Affecting Nutritional Intake: decreased appetite  Food/Lutheran/Cultural Preferences: unable to obtain  Food Allergies: no known food allergies    Wound(s):      Altered Skin Integrity 10/02/23 posterior Head Other (comment)-Tissue loss description: Full thickness noted    Last Bowel Movement: 10/08/23    Comments    10/3/2023: Pt unable to answer questions clearly. Per tray observation, pt has <25% PO intake of breakfast. Pt reported not being hungry. Will add Boost Plus BID as preventative MNT. Encouraged adequate PO intake. No reported N/V. Last BM documented as 9/29/2023. Per EMR, pt weighed 71.7 kg on 9/6/2023 (9.5% wt loss in 1 month, significant). Will monitor.    10/6/2023: Spoke with nurse.The nurse reports pt refused breakfast, presumed poor appetite. The nurse denies N/V/D/C. Last BM noted. Boost Plus ordered. Encouraged adequate PO intake.    10/11 pt sleeping, ate about 25% of lunch per nursing, drinking some boost    10/13 pt  "sleeping, nurse reports pt not eating much, not drinking the boost today either; discussed adding an appetite stimulant with nurse; says pt is usually more awake but she has been started on a new medication that is making her tired    10/17 fair appetite, tolerating oral diet, eating better    Anthropometrics    Height: 5' 7" (170.2 cm) Height Method: Stated  Last Weight: 64.9 kg (143 lb) (23) Weight Method: Standard Scale  BMI (Calculated): 22.4  BMI Classification: normal (BMI 18.5-24.9)     Ideal Body Weight (IBW), Female: 135 lb     % Ideal Body Weight, Female (lb): 105.93 %                    Usual Body Weight (UBW), k.7 kg  % Usual Body Weight: 90.66     Usual Weight Provided By: EMR weight history    Wt Readings from Last 5 Encounters:   23 64.9 kg (143 lb)   23 71.7 kg (158 lb)   23 71.7 kg (158 lb)   23 75.3 kg (166 lb)   23 69 kg (152 lb 1.9 oz)     Weight Change(s) Since Admission:  Admit Weight: 64.9 kg (143 lb) (23)  2023: 64.9 kg  10/6/2023: no new wts    Estimated Needs    Weight Used For Calorie Calculations: 64.9 kg (143 lb 1.3 oz)  Energy Calorie Requirements (kcal): 0701-2553 (25-30 kcal/kg)  Energy Need Method: Kcal/kg  Weight Used For Protein Calculations: 64.9 kg (143 lb 1.3 oz)  Protein Requirements: 65-78 (1.0-1.2 g/kg)  Fluid Requirements (mL): 1622 (1 mL/kcal)  Temp (24hrs), Av.1 °F (36.7 °C), Min:97.7 °F (36.5 °C), Max:98.5 °F (36.9 °C)       Enteral Nutrition    Patient not receiving enteral nutrition at this time.    Parenteral Nutrition    Patient not receiving parenteral nutrition support at this time.    Evaluation of Received Nutrient Intake    Calories: not meeting estimated needs  Protein: not meeting estimated needs    Patient Education    Not applicable.    Nutrition Diagnosis     PES: Inadequate oral intake related to acute illness as evidenced by poor PO intake since admit. (improving)    Interventions/Goals "     Intervention(s): general/healthful diet and commercial beverage  Goal: Meet greater than 75% of nutritional needs by follow-up. (goal progressing)    Monitoring & Evaluation     Dietitian will monitor food and beverage intake, weight, electrolyte/renal panel, glucose/endocrine profile, and gastrointestinal profile.  Nutrition Risk/Follow-Up: high (follow-up in 1-4 days)   Please consult if re-assessment needed sooner.

## 2023-10-17 NOTE — PT/OT/SLP PROGRESS
Physical Therapy      Patient Name:  Danielle Loera   MRN:  [id number]    Attempted to see patient for PT treatment. Patient refused to mobilize by closing eyes, shaking head no, and pulling covers up over shoulders. PT to follow up as appropriate.

## 2023-10-18 PROCEDURE — 25000003 PHARM REV CODE 250: Performed by: INTERNAL MEDICINE

## 2023-10-18 PROCEDURE — 11000001 HC ACUTE MED/SURG PRIVATE ROOM

## 2023-10-18 PROCEDURE — 63600175 PHARM REV CODE 636 W HCPCS: Performed by: NURSE PRACTITIONER

## 2023-10-18 PROCEDURE — 25000003 PHARM REV CODE 250: Performed by: NURSE PRACTITIONER

## 2023-10-18 RX ADMIN — SACUBITRIL AND VALSARTAN 1 TABLET: 24; 26 TABLET, FILM COATED ORAL at 08:10

## 2023-10-18 RX ADMIN — DONEPEZIL HYDROCHLORIDE 5 MG: 5 TABLET, FILM COATED ORAL at 09:10

## 2023-10-18 RX ADMIN — CARVEDILOL 3.12 MG: 3.12 TABLET, FILM COATED ORAL at 04:10

## 2023-10-18 RX ADMIN — ENOXAPARIN SODIUM 40 MG: 40 INJECTION SUBCUTANEOUS at 04:10

## 2023-10-18 RX ADMIN — SENNOSIDES AND DOCUSATE SODIUM 1 TABLET: 50; 8.6 TABLET ORAL at 09:10

## 2023-10-18 RX ADMIN — TAMSULOSIN HYDROCHLORIDE 0.4 MG: 0.4 CAPSULE ORAL at 08:10

## 2023-10-18 RX ADMIN — RISPERIDONE 1 MG: 1 TABLET ORAL at 09:10

## 2023-10-18 RX ADMIN — POLYETHYLENE GLYCOL 3350 17 G: 17 POWDER, FOR SOLUTION ORAL at 08:10

## 2023-10-18 RX ADMIN — SACUBITRIL AND VALSARTAN 1 TABLET: 24; 26 TABLET, FILM COATED ORAL at 09:10

## 2023-10-18 RX ADMIN — RISPERIDONE 0.5 MG: 0.25 TABLET, FILM COATED ORAL at 12:10

## 2023-10-18 RX ADMIN — CARVEDILOL 3.12 MG: 3.12 TABLET, FILM COATED ORAL at 08:10

## 2023-10-18 RX ADMIN — Medication 1 TABLET: at 08:10

## 2023-10-18 RX ADMIN — DIVALPROEX SODIUM 500 MG: 500 TABLET, FILM COATED, EXTENDED RELEASE ORAL at 09:10

## 2023-10-18 RX ADMIN — SENNOSIDES AND DOCUSATE SODIUM 1 TABLET: 50; 8.6 TABLET ORAL at 08:10

## 2023-10-18 NOTE — PROGRESS NOTES
[facility] Medicine Progress Note        Chief Complaint: Inpatient follow-up on failure to thrive    HPI:   Danielle Loera is a 89 y.o. female with  PMHx of  HTN, CKD stage III, dementia, anemia;, anxiety;  presents to the ED s/p fall out of bed at home with associated head injury. It was reported per the family pt climbed out of bed and fell hitting the back of her head. Pt was recently admitted to our services last month when she had a fall at that itme and discharged to  rehab and was reportedly discharged from rehab the day of arrival in the ED. No reports of LOC, no reports of CP, SOB, N/V/D or fever per family reports.      Initial VS /75 P 76 R 16 T 99 F O2 saturation 98% on room air.  Labs reviewed demonstrated HH 9.2/28.2, BUN/Creat 22.8/1.09. CT of the head without contrast showed no new or worsened intracranial abnormality. Re-demonstrated posterior right parietal scalp hematoma without depressed skull fracture. CT of the cervical spine without contrast no convincing acute cervical spine abnormality. Pt is pleasantly confused and can not provide accurate historical information. Pt received zyprexa  in the ED. Family is desiring NH placement. Pt is admitted to  services for further management.      Pt is noted to have serosanguinous discharge form altered skin integrity located occipital scalp. Wound care was consulted and culture obtained.  Wound cultures grew MRSA and doxy was added.  Seroquel was continued to help with hospital-acquired delirium. Later changed to risperidone and additionally Depakote was added for behavior control related to dementia with good result. Home meds are reviewed and resumed as appropriate. PT/OT consulted and SNF placement suggested. CM consulted to assist with DC planning.      Interval Hx:   Patient is sitting up in bed and smiling.  No new issues have been reported to.  Patient is afebrile, on room air, and  "hemodynamically stable.  There are no visitors present today.    Objective/physical exam:  General:  Cachectic, elderly  female in no acute distress  HENT: normocephalic, atraumatic  Eye: PERRL, EOMI, clear conjunctiva  Neck: full ROM, no thyromegaly  Respiratory: diminished breath sounds bilaterally  Cardiovascular: regular rate and rhythm  Gastrointestinal: non-distended, positive bowel sounds, non-tender  Musculoskeletal: generalized muscular atrophy  Neurological: cranial nerves grossly intact, no focal neurological deficit  Psychiatric: cooperative, demented      VITAL SIGNS: 24 HRS MIN & MAX LAST   Temp  Min: 98.2 °F (36.8 °C)  Max: 99.5 °F (37.5 °C) 98.9 °F (37.2 °C)   BP  Min: 114/64  Max: 131/73 128/71   Pulse  Min: 57  Max: 76  76   Resp  Min: 16  Max: 18 18   SpO2  Min: 95 %  Max: 97 % 96 %     I have reviewed the following labs:  No results for input(s): "WBC", "RBC", "HGB", "HCT", "MCV", "MCH", "MCHC", "RDW", "PLT", "MPV", "GRAN", "LYMPH", "MONO", "BASO", "NRBC" in the last 168 hours.    No results for input(s): "NA", "K", "CL", "CO2", "ANIONGAP", "BUN", "CREATININE", "GLU", "CALCIUM", "PH", "MG", "ALBUMIN", "PROT", "ALKPHOS", "ALT", "AST", "BILITOT" in the last 168 hours.    Microbiology Results (last 7 days)       ** No results found for the last 168 hours. **             See below for Radiology    Scheduled Med:   B12-levomefolate calcium-B6  1 tablet Oral Daily    carvediloL  3.125 mg Oral BID WM    divalproex ER  500 mg Oral Nightly    donepeziL  5 mg Oral QHS    enoxparin  40 mg Subcutaneous Daily    polyethylene glycol  17 g Oral Daily    risperiDONE  0.5 mg Oral After lunch    risperiDONE  1 mg Oral After dinner    sacubitriL-valsartan  1 tablet Oral BID    senna-docusate 8.6-50 mg  1 tablet Oral BID    tamsulosin  0.4 mg Oral Daily      Continuous Infusions:  None.       PRN Meds:  acetaminophen, aluminum-magnesium hydroxide-simethicone, bisacodyL, cloNIDine, hydrALAZINE, " melatonin, naloxone, ondansetron, polyethylene glycol, prochlorperazine, simethicone     Assessment/Plan:  Failure to thrive   Progressive dementia with behavioral disturbance  MRSA posterior right scalp wound, completed treatment  Essential hypertension   Anemia of chronic disease   Cardiomyopathy  Vitamin B12 deficiency  Constipation      Plan:  Bowel regimen  Continue fall precautions, physical therapy, occupational therapy, appropriate home medications, and other supportive care while awaiting definitive placement      VTE prophylaxis:  Lovenox    Patient condition:  Stable    Anticipated discharge and Disposition:   [facility]      All diagnosis and differential diagnosis have been reviewed; assessment and plan has been documented; I have personally reviewed the labs and test results that are presently available; I have reviewed the patients medication list; I have reviewed the consulting providers response and recommendations. I have reviewed or attempted to review medical records based upon their availability    All of the patient's questions have been  addressed and answered. Patient's is agreeable to the above stated plan. I will continue to monitor closely and make adjustments to medical management as needed.  _____________________________________________________________________      Radiology:  I have personally reviewed the following imaging and agree with the radiologist.     CT Cervical Spine Without Contrast  EXAMINATION  CT CERVICAL SPINE WITHOUT CONTRAST    CLINICAL HISTORY  Neck trauma (Age >= 65y);    TECHNIQUE  Non-contrast helical-acquisition CT images of the cervical spine were obtained and multiplanar reformats accomplished by a CT technologist at a separate workstation, pushed to PACS for physician review.    COMPARISON  6 September 2023    FINDINGS  Images were reviewed in bone, soft tissue, and lung windows.    Exam quality: adequate for  evaluation    Visualized levels: Skull base through T2.    No newly identified acute cortical displacement or other findings to suggest traumatic cervical spine injury.  Degenerative changes throughout the visualized spinal column are similar.    No prevertebral soft tissue swelling.  Paraspinal musculature and more superficial soft tissues are without acute finding.  Low-density nodule of the right hemithyroid unchanged.    IMPRESSION  1. No convincing acute cervical spine abnormality.  2. Chronic findings without gross change from the exam a few weeks prior.  ==========    Please note ligament, spinal cord, and/or vascular abnormalities cannot be excluded on the basis of this examination.  Additional imaging recommended if there is elevated clinical concern for high-grade soft tissue injury.    RADIATION DOSE  Automated tube current modulation, weight-based exposure dosing, and/or iterative reconstruction technique utilized to reach lowest reasonably achievable exposure rate.    DLP: 1427 mGy*cm    Electronically signed by: Errol Whitten  Date:    09/29/2023  Time:    21:12  CT Head Without Contrast  EXAMINATION  CT HEAD WITHOUT CONTRAST    CLINICAL HISTORY  Head trauma, moderate-severe;    TECHNIQUE  Axial non-contrast CT images of the head were acquired and multiplanar reconstructions accomplished by a CT technologist at a separate workstation, pushed to PACS for physician review.    COMPARISON  6 September 2023    FINDINGS  Images were reviewed in subdural, brain, soft tissue, and bone windows.    Exam quality: Motion/streak artifact limits assessment of the posterior fossa.    Hemorrhage: No evidence of acute hyperattenuating blood products.    Parenchyma: There is diffuse bilateral supratentorial white matter hypoattenuation, typical of chronic microvascular changes. No discrete mass, mass effect, or CT evidence of acute large vascular territory insult. Gray-white differentiation is preserved.    Midline  shift: None.    CSF spaces: Proportional appearance of ventricular and sulcal enlargement. No hydrocephalus. No masses or fluid collections.    Vasculature: No focally hyperdense artery. Scattered carotid siphon calcifications are present. No abnormal densities within the dural sinuses.    Other findings: Findings suggestive of large right posterior scalp hematoma unchanged in comparison.  No new abnormality of the extracranial soft tissues identified.  There is no depressed skull fracture.  Mastoids are well aerated. No focal abnormality of the sella. The included facial structures are unremarkable.    IMPRESSION  1. No new or worsened intracranial abnormality.  2. Redemonstrated posterior right parietal scalp hematoma without depressed skull fracture.  3. Chronic findings above.    RADIATION DOSE  Automated tube current modulation, weight-based exposure dosing, and/or iterative reconstruction technique utilized to reach lowest reasonably achievable exposure rate.    DLP: 1427 mGy*cm    Electronically signed by: Errol Whitten  Date:    09/29/2023  Time:    21:10      Candido Talley MD   10/18/2023

## 2023-10-18 NOTE — PLAN OF CARE
Spoke with Soy, patient's son. Soy is wanting to pay out of pocket for long term placement after SNF until patient qualifies for medicaid. Called [facility] but no call returned to discuss plan. Soy stated  of the [facility] would be second option. Spoke to Kathleen at [facility], will review and call patients son and I back regarding decision.

## 2023-10-19 LAB
ANION GAP SERPL CALC-SCNC: 7 MEQ/L
BASOPHILS # BLD AUTO: 0.01 X10(3)/MCL
BASOPHILS NFR BLD AUTO: 0.1 %
BUN SERPL-MCNC: 15.4 MG/DL (ref 9.8–20.1)
CALCIUM SERPL-MCNC: 8.1 MG/DL (ref 8.4–10.2)
CHLORIDE SERPL-SCNC: 105 MMOL/L (ref 98–111)
CO2 SERPL-SCNC: 30 MMOL/L (ref 23–31)
CREAT SERPL-MCNC: 0.71 MG/DL (ref 0.55–1.02)
CREAT/UREA NIT SERPL: 22
EOSINOPHIL # BLD AUTO: 0.01 X10(3)/MCL (ref 0–0.9)
EOSINOPHIL NFR BLD AUTO: 0.1 %
ERYTHROCYTE [DISTWIDTH] IN BLOOD BY AUTOMATED COUNT: 15.2 % (ref 11.5–17)
GFR SERPLBLD CREATININE-BSD FMLA CKD-EPI: >60 MLS/MIN/1.73/M2
GLUCOSE SERPL-MCNC: 82 MG/DL (ref 75–121)
HCT VFR BLD AUTO: 30 % (ref 37–47)
HGB BLD-MCNC: 9.6 G/DL (ref 12–16)
IMM GRANULOCYTES # BLD AUTO: 0.18 X10(3)/MCL (ref 0–0.04)
IMM GRANULOCYTES NFR BLD AUTO: 2.1 %
LYMPHOCYTES # BLD AUTO: 1.51 X10(3)/MCL (ref 0.6–4.6)
LYMPHOCYTES NFR BLD AUTO: 17.9 %
MCH RBC QN AUTO: 29.8 PG (ref 27–31)
MCHC RBC AUTO-ENTMCNC: 32 G/DL (ref 33–36)
MCV RBC AUTO: 93.2 FL (ref 80–94)
MONOCYTES # BLD AUTO: 1.3 X10(3)/MCL (ref 0.1–1.3)
MONOCYTES NFR BLD AUTO: 15.4 %
NEUTROPHILS # BLD AUTO: 5.44 X10(3)/MCL (ref 2.1–9.2)
NEUTROPHILS NFR BLD AUTO: 64.4 %
NRBC BLD AUTO-RTO: 0 %
PLATELET # BLD AUTO: 171 X10(3)/MCL (ref 130–400)
PMV BLD AUTO: 11.2 FL (ref 7.4–10.4)
POTASSIUM SERPL-SCNC: 4 MMOL/L (ref 3.5–5.1)
RBC # BLD AUTO: 3.22 X10(6)/MCL (ref 4.2–5.4)
SODIUM SERPL-SCNC: 142 MMOL/L (ref 132–146)
WBC # SPEC AUTO: 8.45 X10(3)/MCL (ref 4.5–11.5)

## 2023-10-19 PROCEDURE — 97530 THERAPEUTIC ACTIVITIES: CPT

## 2023-10-19 PROCEDURE — 80048 BASIC METABOLIC PNL TOTAL CA: CPT | Performed by: HOSPITALIST

## 2023-10-19 PROCEDURE — 25000003 PHARM REV CODE 250: Performed by: INTERNAL MEDICINE

## 2023-10-19 PROCEDURE — 97530 THERAPEUTIC ACTIVITIES: CPT | Mod: CQ

## 2023-10-19 PROCEDURE — 11000001 HC ACUTE MED/SURG PRIVATE ROOM

## 2023-10-19 PROCEDURE — 25000003 PHARM REV CODE 250: Performed by: NURSE PRACTITIONER

## 2023-10-19 PROCEDURE — 85025 COMPLETE CBC W/AUTO DIFF WBC: CPT | Performed by: HOSPITALIST

## 2023-10-19 RX ADMIN — SACUBITRIL AND VALSARTAN 1 TABLET: 24; 26 TABLET, FILM COATED ORAL at 08:10

## 2023-10-19 RX ADMIN — DIVALPROEX SODIUM 500 MG: 500 TABLET, FILM COATED, EXTENDED RELEASE ORAL at 08:10

## 2023-10-19 RX ADMIN — Medication 1 TABLET: at 08:10

## 2023-10-19 RX ADMIN — POLYETHYLENE GLYCOL 3350 17 G: 17 POWDER, FOR SOLUTION ORAL at 08:10

## 2023-10-19 RX ADMIN — TAMSULOSIN HYDROCHLORIDE 0.4 MG: 0.4 CAPSULE ORAL at 08:10

## 2023-10-19 RX ADMIN — SENNOSIDES AND DOCUSATE SODIUM 1 TABLET: 50; 8.6 TABLET ORAL at 08:10

## 2023-10-19 RX ADMIN — CARVEDILOL 3.12 MG: 3.12 TABLET, FILM COATED ORAL at 08:10

## 2023-10-19 RX ADMIN — POLYETHYLENE GLYCOL 3350 17 G: 17 POWDER, FOR SOLUTION ORAL at 09:10

## 2023-10-19 RX ADMIN — DONEPEZIL HYDROCHLORIDE 5 MG: 5 TABLET, FILM COATED ORAL at 08:10

## 2023-10-19 NOTE — PT/OT/SLP PROGRESS
"Physical Therapy Treatment    Patient Name:  Danielle Loera   MRN:  [id number]    Recommendations:     Discharge therapy intensity: low intensity   Discharge Equipment Recommendations: to be determined by next level of care  Barriers to discharge: Impaired mobility    Assessment:     Danielle Loera is a 89 y.o. female admitted with a medical diagnosis of Frequent falls.  She presents with the following impairments/functional limitations: weakness, impaired endurance, impaired balance, impaired functional mobility, impaired self care skills, decreased safety awareness, impaired cognition.  Pt appears withdrawn, minimal verbal responses, did not respond to orientation questions, poor initiation when given verbal and tactile cues to mobilize. Pt required total assist for bed mobility, participated some in sitting balance, no active ROM of LE's with cues and assistance.      Rehab Prognosis: Poor; patient would benefit from acute skilled PT services to address these deficits and reach maximum level of function.    Recent Surgery: * No surgery found *      Plan:     During this hospitalization, patient to be seen 3 x/week to address the identified rehab impairments via therapeutic activities and progress toward the following goals:    Plan of Care Expires:  10/31/23    Subjective     Chief Complaint: none stated, reported "I'm watching TV"  Patient/Family Comments/goals: none stated  Pain/Comfort:  Pain Rating 1: 0/10      Objective:     Communicated with RN prior to session.  Patient found HOB elevated with telemetry, pulse ox (continuous) upon PT entry to room. Pt with crumbs from breakfast on her. Noted pt holding an open lotion bottle but did not appear to know what to do with it.     General Precautions: Standard, contact  Orthopedic Precautions: N/A  Braces: N/A  Respiratory Status: Room air  Blood Pressure: NT  Skin Integrity: Visible skin intact      Functional Mobility:  Bed Mobility:    Supine<->sit with " total assist  Balance: Min to mod assist for balance with posterior lean    Therapeutic Activities/Exercises:  PROM CARO ankle and knee. Unable to elicit active ROM with max VC's.     Education:  Patient provided with verbal education education regarding POC and importance of mobility.  Additional teaching is warranted.     Patient left HOB elevated with all lines intact and call button in reach..    GOALS:   Multidisciplinary Problems       Physical Therapy Goals          Problem: Physical Therapy    Goal Priority Disciplines Outcome Goal Variances Interventions   Physical Therapy Goal     PT, PT/OT Ongoing, Progressing     Description: Goals to be met by: 10/31/23     Patient will increase functional independence with mobility by performin. Supine to sit with Modified [place]  2. Sit to stand transfer with Modified [place]  3. Gait  x 200 feet with Modified [place] using Rolling Walker.                          Time Tracking:     PT Received On: 10/19/23  PT Start Time: 914     PT Stop Time: 932  PT Total Time (min): 18 min     Billable Minutes: Therapeutic Activity 1 unit    Treatment Type: Treatment  PT/PTA: PTA     Number of PTA visits since last PT visit: 4     10/19/2023

## 2023-10-19 NOTE — PLAN OF CARE
SSC spoke to Kathleen at [facility], patient's son Soy delivered bank statements to nursing home. Medical director is reviewing patient.

## 2023-10-19 NOTE — PROGRESS NOTES
[facility] Medicine Progress Note        Chief Complaint: Inpatient Follow-up     HPI:   89 y.o. female with  PMHx of  HTN, CKD stage III, dementia, anemia;, anxiety;  presents to the ED s/p fall out of bed at home with associated head injury. It was reported per the family pt climbed out of bed and fell hitting the back of her head. Pt was recently admitted to our services last month when she had a fall at that itme and discharged to  rehab and was reportedly discharged from rehab the day of arrival in the ED. No reports of LOC, no reports of CP, SOB, N/V/D or fever per family reports.      Initial VS /75 P 76 R 16 T 99 F O2 saturation 98% on room air.  Labs reviewed demonstrated HH 9.2/28.2, BUN/Creat 22.8/1.09. CT of the head without contrast showed no new or worsened intracranial abnormality. Re-demonstrated posterior right parietal scalp hematoma without depressed skull fracture. CT of the cervical spine without contrast no convincing acute cervical spine abnormality. Pt is pleasantly confused and can not provide accurate historical information. Pt received zyprexa  in the ED. Family is desiring NH placement. Pt is admitted to  services for further management.      Pt is noted to have serosanguinous discharge form altered skin integrity located occipital scalp. Wound care was consulted and culture obtained.  Wound cultures grew MRSA and doxy was added.  Seroquel was continued to help with hospital-acquired delirium. Later changed to risperidone and additionally Depakote was added for behavior control related to dementia with good result. Home meds are reviewed and resumed as appropriate. PT/OT consulted and SNF placement suggested. CM consulted to assist with DC planning.        Interval Hx:   Patient resting comfortably in bed.  No events overnight.  Vitals are stable..     Objective/physical exam:  General:  Cachectic, elderly  female in no acute  "distress  HENT: normocephalic, atraumatic  Eye: PERRL, EOMI, clear conjunctiva  Neck: full ROM, no thyromegaly  Respiratory: diminished breath sounds bilaterally  Cardiovascular: regular rate and rhythm  Gastrointestinal: non-distended, positive bowel sounds, non-tender  Musculoskeletal: generalized muscular atrophy  Neurological: cranial nerves grossly intact, no focal neurological deficit  Psychiatric: cooperative, demented  VITAL SIGNS: 24 HRS MIN & MAX LAST   Temp  Min: 98.4 °F (36.9 °C)  Max: 99 °F (37.2 °C) 98.4 °F (36.9 °C)   BP  Min: 106/57  Max: 142/85 132/70   Pulse  Min: 68  Max: 87  86   Resp  Min: 14  Max: 18 16   SpO2  Min: 94 %  Max: 97 % (!) 94 %       No results for input(s): "WBC", "RBC", "HGB", "HCT", "MCV", "MCH", "MCHC", "RDW", "PLT", "MPV", "GRAN", "LYMPH", "MONO", "BASO", "NRBC" in the last 168 hours.    No results for input(s): "NA", "K", "CL", "CO2", "ANIONGAP", "BUN", "CREATININE", "GLU", "CALCIUM", "PH", "MG", "ALBUMIN", "PROT", "ALKPHOS", "ALT", "AST", "BILITOT" in the last 168 hours.       Microbiology Results (last 7 days)       ** No results found for the last 168 hours. **             See below for Radiology    Scheduled Med:   B12-levomefolate calcium-B6  1 tablet Oral Daily    carvediloL  3.125 mg Oral BID WM    divalproex ER  500 mg Oral Nightly    donepeziL  5 mg Oral QHS    enoxparin  40 mg Subcutaneous Daily    polyethylene glycol  17 g Oral Daily    risperiDONE  0.5 mg Oral After lunch    risperiDONE  1 mg Oral After dinner    sacubitriL-valsartan  1 tablet Oral BID    senna-docusate 8.6-50 mg  1 tablet Oral BID    tamsulosin  0.4 mg Oral Daily        Continuous Infusions:       PRN Meds:  acetaminophen, aluminum-magnesium hydroxide-simethicone, bisacodyL, cloNIDine, hydrALAZINE, melatonin, naloxone, ondansetron, polyethylene glycol, prochlorperazine, simethicone       Assessment/Plan:   Failure to thrive   Progressive dementia with behavioral disturbance  MRSA posterior right " scalp wound, completed treatment  Essential hypertension   Anemia of chronic disease   Cardiomyopathy  Vitamin B12 deficiency  Constipation    Awaiting placement.  Case management is following along.    Medically stable.  Continue current regimen.  No labs in about a week.  Will get another set today      All diagnosis and differential diagnosis have been reviewed; assessment and plan has been documented; I have personally reviewed the labs and test results that are presently available; I have reviewed the patients medication list; I have reviewed the consulting providers response and recommendations. I have reviewed or attempted to review medical records based upon their availability    All of the patient's questions have been  addressed and answered. Patient's is agreeable to the above stated plan. I will continue to monitor closely and make adjustments to medical management as needed.  _____________________________________________________________________      Radiology:  CT Cervical Spine Without Contrast  EXAMINATION  CT CERVICAL SPINE WITHOUT CONTRAST    CLINICAL HISTORY  Neck trauma (Age >= 65y);    TECHNIQUE  Non-contrast helical-acquisition CT images of the cervical spine were obtained and multiplanar reformats accomplished by a CT technologist at a separate workstation, pushed to PACS for physician review.    COMPARISON  6 September 2023    FINDINGS  Images were reviewed in bone, soft tissue, and lung windows.    Exam quality: adequate for evaluation    Visualized levels: Skull base through T2.    No newly identified acute cortical displacement or other findings to suggest traumatic cervical spine injury.  Degenerative changes throughout the visualized spinal column are similar.    No prevertebral soft tissue swelling.  Paraspinal musculature and more superficial soft tissues are without acute finding.  Low-density nodule of the right hemithyroid unchanged.    IMPRESSION  1. No convincing acute cervical  spine abnormality.  2. Chronic findings without gross change from the exam a few weeks prior.  ==========    Please note ligament, spinal cord, and/or vascular abnormalities cannot be excluded on the basis of this examination.  Additional imaging recommended if there is elevated clinical concern for high-grade soft tissue injury.    RADIATION DOSE  Automated tube current modulation, weight-based exposure dosing, and/or iterative reconstruction technique utilized to reach lowest reasonably achievable exposure rate.    DLP: 1427 mGy*cm    Electronically signed by: Errol Whitten  Date:    09/29/2023  Time:    21:12  CT Head Without Contrast  EXAMINATION  CT HEAD WITHOUT CONTRAST    CLINICAL HISTORY  Head trauma, moderate-severe;    TECHNIQUE  Axial non-contrast CT images of the head were acquired and multiplanar reconstructions accomplished by a CT technologist at a separate workstation, pushed to PACS for physician review.    COMPARISON  6 September 2023    FINDINGS  Images were reviewed in subdural, brain, soft tissue, and bone windows.    Exam quality: Motion/streak artifact limits assessment of the posterior fossa.    Hemorrhage: No evidence of acute hyperattenuating blood products.    Parenchyma: There is diffuse bilateral supratentorial white matter hypoattenuation, typical of chronic microvascular changes. No discrete mass, mass effect, or CT evidence of acute large vascular territory insult. Gray-white differentiation is preserved.    Midline shift: None.    CSF spaces: Proportional appearance of ventricular and sulcal enlargement. No hydrocephalus. No masses or fluid collections.    Vasculature: No focally hyperdense artery. Scattered carotid siphon calcifications are present. No abnormal densities within the dural sinuses.    Other findings: Findings suggestive of large right posterior scalp hematoma unchanged in comparison.  No new abnormality of the extracranial soft tissues identified.  There is no  depressed skull fracture.  Mastoids are well aerated. No focal abnormality of the sella. The included facial structures are unremarkable.    IMPRESSION  1. No new or worsened intracranial abnormality.  2. Redemonstrated posterior right parietal scalp hematoma without depressed skull fracture.  3. Chronic findings above.    RADIATION DOSE  Automated tube current modulation, weight-based exposure dosing, and/or iterative reconstruction technique utilized to reach lowest reasonably achievable exposure rate.    DLP: 1427 mGy*cm    Electronically signed by: Errol Whitten  Date:    09/29/2023  Time:    21:10      Devante Richter MD   10/19/2023

## 2023-10-20 PROCEDURE — 25000003 PHARM REV CODE 250: Performed by: INTERNAL MEDICINE

## 2023-10-20 PROCEDURE — 11000001 HC ACUTE MED/SURG PRIVATE ROOM

## 2023-10-20 PROCEDURE — 97530 THERAPEUTIC ACTIVITIES: CPT | Mod: CQ

## 2023-10-20 PROCEDURE — 63600175 PHARM REV CODE 636 W HCPCS: Performed by: NURSE PRACTITIONER

## 2023-10-20 RX ORDER — RISPERIDONE 0.25 MG/1
0.5 TABLET ORAL
Status: DISCONTINUED | OUTPATIENT
Start: 2023-10-20 | End: 2023-10-22

## 2023-10-20 RX ADMIN — DIVALPROEX SODIUM 500 MG: 500 TABLET, FILM COATED, EXTENDED RELEASE ORAL at 09:10

## 2023-10-20 RX ADMIN — RISPERIDONE 0.5 MG: 0.25 TABLET, FILM COATED ORAL at 09:10

## 2023-10-20 RX ADMIN — DONEPEZIL HYDROCHLORIDE 5 MG: 5 TABLET, FILM COATED ORAL at 09:10

## 2023-10-20 RX ADMIN — ENOXAPARIN SODIUM 40 MG: 40 INJECTION SUBCUTANEOUS at 07:10

## 2023-10-20 RX ADMIN — SENNOSIDES AND DOCUSATE SODIUM 1 TABLET: 50; 8.6 TABLET ORAL at 09:10

## 2023-10-20 NOTE — PT/OT/SLP PROGRESS
Physical Therapy Treatment    Patient Name:  Danielle Loera   MRN:  [id number]    Recommendations:     Discharge therapy intensity: low intensity   Discharge Equipment Recommendations: to be determined by next level of care  Barriers to discharge: Impaired mobility    Assessment:     Danielle Loera is a 89 y.o. female admitted with a medical diagnosis of Frequent falls.  She presents with the following impairments/functional limitations: weakness, impaired endurance, impaired balance, impaired functional mobility, impaired self care skills, decreased safety awareness, impaired cognition .    Rehab Prognosis: Poor; patient would benefit from acute skilled PT services to address these deficits and reach maximum level of function.    Recent Surgery: * No surgery found *      Plan:     During this hospitalization, patient to be seen 3 x/week to address the identified rehab impairments via therapeutic activities and progress toward the following goals:    Plan of Care Expires:  10/31/23    Subjective     Chief Complaint: none stated  Patient/Family Comments/goals:   Pain/Comfort:         Objective:     Communicated with nursing prior to session.  Patient found left sidelying with pulse ox (continuous), telemetry upon PT entry to room.     General Precautions: Standard, contact  Orthopedic Precautions: N/A  Braces: N/A  Respiratory Status: Room air  Blood Pressure:   Skin Integrity:     Functional Mobility:  Bed Mobility:     Rolling L/R:  total assistance and of 2 persons  Supine to Sit: total assistance and of 2 persons  Sit to Supine: total assistance and of 2 persons  Balance:  Sitting - SBA    Therapeutic Activities/Exercises:  Pt. Sat EOB ~ 8 mins  Anterior and lateral reaching ~ 3 reps  Seated knee ext ~ 5 reps    ~pt. Not following majority of commands, required max TC/VC for majority of tasks completed    Patient left right sidelying with all lines intact and call button in reach..    GOALS:    Multidisciplinary Problems       Physical Therapy Goals          Problem: Physical Therapy    Goal Priority Disciplines Outcome Goal Variances Interventions   Physical Therapy Goal     PT, PT/OT Ongoing, Progressing     Description: Goals to be met by: 10/31/23     Patient will increase functional independence with mobility by performin. Supine to sit with Modified [place]  2. Sit to stand transfer with Modified [place]  3. Gait  x 200 feet with Modified [place] using Rolling Walker.                          Time Tracking:     PT Received On: 10/20/23  PT Start Time: 1454     PT Stop Time: 1508  PT Total Time (min): 14 min     Billable Minutes: Therapeutic Activity 14    Treatment Type: Treatment  PT/PTA: PTA     Number of PTA visits since last PT visit: 5     10/20/2023

## 2023-10-20 NOTE — PROGRESS NOTES
[facility] Hospital Medicine Progress Note        Chief Complaint: Inpatient Follow-up for Failure to thrive     HPI:   89 y.o. female with  PMHx of  HTN, CKD stage III, dementia, anemia;, anxiety;  presents to the ED s/p fall out of bed at home with associated head injury. It was reported per the family pt climbed out of bed and fell hitting the back of her head. Pt was recently admitted to our services last month when she had a fall at that itme and discharged to  rehab and was reportedly discharged from rehab the day of arrival in the ED. No reports of LOC, no reports of CP, SOB, N/V/D or fever per family reports.      Initial VS /75 P 76 R 16 T 99 F O2 saturation 98% on room air.  Labs reviewed demonstrated HH 9.2/28.2, BUN/Creat 22.8/1.09. CT of the head without contrast showed no new or worsened intracranial abnormality. Re-demonstrated posterior right parietal scalp hematoma without depressed skull fracture. CT of the cervical spine without contrast no convincing acute cervical spine abnormality. Pt is pleasantly confused and can not provide accurate historical information. Pt received zyprexa  in the ED. Family is desiring NH placement. Pt is admitted to  services for further management.      Pt is noted to have serosanguinous discharge form altered skin integrity located occipital scalp. Wound care was consulted and culture obtained.  Wound cultures grew MRSA and doxy was added.  Seroquel was continued to help with hospital-acquired delirium. Later changed to risperidone and additionally Depakote was added for behavior control related to dementia with good result. Home meds are reviewed and resumed as appropriate. PT/OT consulted and SNF placement suggested. CM consulted to assist with DC planning.          Interval Hx:   No acute events reported overnight   Pt is awake, calm and cooperative , oriented to self   Appears weak in general   Bladder scan showed 900 ml  retention at 3pm today but able to urinate spontaneously after she sat on bedside commode.     BP noted to be soft  Will decreased Entresto to once daily   Continue Coreg 3.125 mg bid   Decrease Risperidone to 0.5 mg daily qpm     Case was discussed with patient's nurse and  on the floor.    Objective/physical exam:  General: In no acute distress, afebrile  Chest: Clear to auscultation bilaterally  Heart: RRR, +S1, S2, no appreciable murmur  Abdomen: Soft, nontender, BS +  MSK: Warm, no lower extremity edema, no clubbing or cyanosis  Neurologic: Awake , oriented to self, Moves all ext spontaneously     VITAL SIGNS: 24 HRS MIN & MAX LAST   Temp  Min: 97.6 °F (36.4 °C)  Max: 99.6 °F (37.6 °C) 98.1 °F (36.7 °C)   BP  Min: 96/60  Max: 109/56 104/69   Pulse  Min: 63  Max: 76  73   Resp  Min: 16  Max: 18 18   SpO2  Min: 95 %  Max: 97 % 95 %     I have reviewed the following labs:  Recent Labs   Lab 10/19/23  0743   WBC 8.45   RBC 3.22*   HGB 9.6*   HCT 30.0*   MCV 93.2   MCH 29.8   MCHC 32.0*   RDW 15.2      MPV 11.2*     Recent Labs   Lab 10/19/23  0743      K 4.0   CO2 30   BUN 15.4   CREATININE 0.71   CALCIUM 8.1*     Microbiology Results (last 7 days)       ** No results found for the last 168 hours. **             See below for Radiology    Scheduled Med:   B12-levomefolate calcium-B6  1 tablet Oral Daily    carvediloL  3.125 mg Oral BID WM    divalproex ER  500 mg Oral Nightly    donepeziL  5 mg Oral QHS    enoxparin  40 mg Subcutaneous Daily    polyethylene glycol  17 g Oral Daily    risperiDONE  0.5 mg Oral After dinner    [START ON 10/21/2023] sacubitriL-valsartan  1 tablet Oral QHS    senna-docusate 8.6-50 mg  1 tablet Oral BID    tamsulosin  0.4 mg Oral Daily      Continuous Infusions:     PRN Meds:  acetaminophen, aluminum-magnesium hydroxide-simethicone, bisacodyL, cloNIDine, hydrALAZINE, melatonin, naloxone, ondansetron, polyethylene glycol, prochlorperazine, simethicone      Assessment/Plan:  Failure to thrive   Progressive dementia with behavioral disturbance  MRSA posterior right scalp wound, completed treatment  Essential hypertension   Anemia of chronic disease   Cardiomyopathy  Vitamin B12 deficiency  Constipation      Plan-    Awaiting placement.  Case management is following along.    Medically stable otherwise   Antihypertensives dose modified today given soft BP  Decreased Risperidone to 0.5 mg qpm to prevent daytime somnolence     VTE prophylaxis: Lovenox    Patient condition:  Fair     Anticipated discharge and Disposition:     SNF/NH placement         All diagnosis and differential diagnosis have been reviewed; assessment and plan has been documented; I have personally reviewed the labs and test results that are presently available; I have reviewed the patients medication list; I have reviewed the consulting providers response and recommendations. I have reviewed or attempted to review medical records based upon their availability    All of the patient's questions have been  addressed and answered. Patient's is agreeable to the above stated plan. I will continue to monitor closely and make adjustments to medical management as needed.      Timur Hartmann MD   10/20/2023

## 2023-10-20 NOTE — PROGRESS NOTES
Inpatient Nutrition Assessment    Admit Date: 9/29/2023   Total duration of encounter: 21 days     Nutrition Recommendation/Prescription     Continue current diet as tolerated  Continue Boost Plus BID (provides 360 kcal and 14 g protein per container)  Encouraged adequate PO intake  Monitor appetite/PO intake, weight, and labs   Consider adding appetite stimulant if intake of meals <50% consistently    Communication of Recommendations: reviewed with nurse    Nutrition Assessment     Malnutrition Assessment/Nutrition-Focused Physical Exam    Malnutrition Context: acute illness or injury (10/20/23 1530)  Malnutrition Level: moderate (10/20/23 1530)  Energy Intake (Malnutrition): less than 75% for greater than 7 days (10/20/23 1530)  Weight Loss (Malnutrition): greater than 5% in 1 month (10/03/23 1455)  Subcutaneous Fat (Malnutrition): other (see comments) (Does not meet criteria) (10/03/23 1455)           Muscle Mass (Malnutrition): other (see comments) (Does not meet criteria) (10/03/23 1455)                          Fluid Accumulation (Malnutrition): other (see comments) (Does not meet criteria) (10/03/23 1455)        A minimum of two characteristics is recommended for diagnosis of either severe or non-severe malnutrition.    Chart Review    Reason Seen: continuous nutrition monitoring and follow-up wound    Malnutrition Screening Tool Results   Have you recently lost weight without trying?: No  Have you been eating poorly because of a decreased appetite?: No   MST Score: 0   Diagnosis:  Acute encephalopathy in the setting of dementia  Recent closed head injury from fall, resulting in hematoma with confusion  MRSA positive scalp wound cultures  Dementia with behavioral disturbances    Relevant Medical History:   HTN  CKD stage III  Dementia  Anemia  Vitamin D deficiency    Nutrition-Related Medications:   Scheduled Meds:   B12-levomefolate calcium-B6  1 tablet Oral Daily    carvediloL  3.125 mg Oral BID WM     divalproex ER  500 mg Oral Nightly    donepeziL  5 mg Oral QHS    enoxparin  40 mg Subcutaneous Daily    polyethylene glycol  17 g Oral Daily    risperiDONE  0.5 mg Oral After lunch    risperiDONE  1 mg Oral After dinner    sacubitriL-valsartan  1 tablet Oral BID    senna-docusate 8.6-50 mg  1 tablet Oral BID    tamsulosin  0.4 mg Oral Daily     Continuous Infusions:  PRN Meds:.acetaminophen, aluminum-magnesium hydroxide-simethicone, bisacodyL, cloNIDine, hydrALAZINE, melatonin, naloxone, ondansetron, polyethylene glycol, prochlorperazine, simethicone    Calorie Containing IV Medications: no significant kcals from medications at this time    Nutrition-Related Labs:  10/3/2023: Gluc 94  10/6/2023: no new labs  10/11: Ca 8.2  10/13: no updated  10/19: Ca 8.1    Diet/PN Order: Diet heart healthy  Oral Supplement Order: Boost Max  Tube Feeding Order: none  Appetite/Oral Intake: fair/25-50% of meals  Factors Affecting Nutritional Intake: decreased appetite  Food/Zoroastrianism/Cultural Preferences: unable to obtain  Food Allergies: no known food allergies    Wound(s):      Altered Skin Integrity 10/02/23 posterior Head Other (comment)-Tissue loss description: Full thickness noted    Last Bowel Movement: 10/18/23    Comments    10/3/2023: Pt unable to answer questions clearly. Per tray observation, pt has <25% PO intake of breakfast. Pt reported not being hungry. Will add Boost Plus BID as preventative MNT. Encouraged adequate PO intake. No reported N/V. Last BM documented as 9/29/2023. Per EMR, pt weighed 71.7 kg on 9/6/2023 (9.5% wt loss in 1 month, significant). Will monitor.    10/6/2023: Spoke with nurse.The nurse reports pt refused breakfast, presumed poor appetite. The nurse denies N/V/D/C. Last BM noted. Boost Plus ordered. Encouraged adequate PO intake.    10/11 pt sleeping, ate about 25% of lunch per nursing, drinking some boost    10/13 pt sleeping, nurse reports pt not eating much, not drinking the boost today  "either; discussed adding an appetite stimulant with nurse; says pt is usually more awake but she has been started on a new medication that is making her tired    10/17 fair appetite, tolerating oral diet, eating better    10/20 nurse reports she is not eating much today, but drinking the boost max    Anthropometrics    Height: 5' 7" (170.2 cm) Height Method: Stated  Last Weight: 64.9 kg (143 lb) (23) Weight Method: Standard Scale  BMI (Calculated): 22.4  BMI Classification: normal (BMI 18.5-24.9)     Ideal Body Weight (IBW), Female: 135 lb     % Ideal Body Weight, Female (lb): 105.93 %                    Usual Body Weight (UBW), k.7 kg  % Usual Body Weight: 90.66     Usual Weight Provided By: EMR weight history    Wt Readings from Last 5 Encounters:   23 64.9 kg (143 lb)   23 71.7 kg (158 lb)   23 71.7 kg (158 lb)   23 75.3 kg (166 lb)   23 69 kg (152 lb 1.9 oz)     Weight Change(s) Since Admission:  Admit Weight: 64.9 kg (143 lb) (23)  2023: 64.9 kg  10/6/2023: no new wts    Estimated Needs    Weight Used For Calorie Calculations: 64.9 kg (143 lb 1.3 oz)  Energy Calorie Requirements (kcal): 2177-0174 (25-30 kcal/kg)  Energy Need Method: Kcal/kg  Weight Used For Protein Calculations: 64.9 kg (143 lb 1.3 oz)  Protein Requirements: 65-78 (1.0-1.2 g/kg)  Fluid Requirements (mL): 1622 (1 mL/kcal)  Temp (24hrs), Av.4 °F (36.9 °C), Min:97.6 °F (36.4 °C), Max:99.6 °F (37.6 °C)       Enteral Nutrition    Patient not receiving enteral nutrition at this time.    Parenteral Nutrition    Patient not receiving parenteral nutrition support at this time.    Evaluation of Received Nutrient Intake    Calories: not meeting estimated needs  Protein: not meeting estimated needs    Patient Education    Not applicable.    Nutrition Diagnosis     PES: Inadequate oral intake related to acute illness as evidenced by <50% intake of meals since admit. " (continues)    Interventions/Goals     Intervention(s): general/healthful diet and commercial beverage  Goal: Meet greater than 75% of nutritional needs by follow-up. (goal progressing)    Monitoring & Evaluation     Dietitian will monitor food and beverage intake, weight, electrolyte/renal panel, glucose/endocrine profile, and gastrointestinal profile.  Nutrition Risk/Follow-Up: high (follow-up in 1-4 days)   Please consult if re-assessment needed sooner.

## 2023-10-21 PROCEDURE — 51701 INSERT BLADDER CATHETER: CPT

## 2023-10-21 PROCEDURE — 11000001 HC ACUTE MED/SURG PRIVATE ROOM

## 2023-10-21 PROCEDURE — 51798 US URINE CAPACITY MEASURE: CPT

## 2023-10-21 PROCEDURE — 25000003 PHARM REV CODE 250: Performed by: INTERNAL MEDICINE

## 2023-10-21 RX ADMIN — DIVALPROEX SODIUM 500 MG: 500 TABLET, FILM COATED, EXTENDED RELEASE ORAL at 09:10

## 2023-10-21 RX ADMIN — DONEPEZIL HYDROCHLORIDE 5 MG: 5 TABLET, FILM COATED ORAL at 09:10

## 2023-10-21 RX ADMIN — SENNOSIDES AND DOCUSATE SODIUM 1 TABLET: 50; 8.6 TABLET ORAL at 09:10

## 2023-10-21 RX ADMIN — SACUBITRIL AND VALSARTAN 1 TABLET: 24; 26 TABLET, FILM COATED ORAL at 09:10

## 2023-10-21 RX ADMIN — RISPERIDONE 0.5 MG: 0.25 TABLET, FILM COATED ORAL at 10:10

## 2023-10-21 NOTE — PLAN OF CARE
Problem: Adult Inpatient Plan of Care  Goal: Patient-Specific Goal (Individualized)  Outcome: Ongoing, Not Progressing  Goal: Absence of Hospital-Acquired Illness or Injury  Outcome: Ongoing, Progressing  Goal: Optimal Comfort and Wellbeing  Outcome: Ongoing, Progressing  Goal: Readiness for Transition of Care  Outcome: Ongoing, Progressing

## 2023-10-21 NOTE — PROGRESS NOTES
[facility] Hospital Medicine Progress Note        Chief Complaint: Inpatient Follow-up for Failure to thrive     HPI:   89 y.o. female with  PMHx of  HTN, CKD stage III, dementia, anemia;, anxiety;  presents to the ED s/p fall out of bed at home with associated head injury. It was reported per the family pt climbed out of bed and fell hitting the back of her head. Pt was recently admitted to our services last month when she had a fall at that itme and discharged to  rehab and was reportedly discharged from rehab the day of arrival in the ED. No reports of LOC, no reports of CP, SOB, N/V/D or fever per family reports.      Initial VS /75 P 76 R 16 T 99 F O2 saturation 98% on room air.  Labs reviewed demonstrated HH 9.2/28.2, BUN/Creat 22.8/1.09. CT of the head without contrast showed no new or worsened intracranial abnormality. Re-demonstrated posterior right parietal scalp hematoma without depressed skull fracture. CT of the cervical spine without contrast no convincing acute cervical spine abnormality. Pt is pleasantly confused and can not provide accurate historical information. Pt received zyprexa  in the ED. Family is desiring NH placement. Pt is admitted to  services for further management.      Pt is noted to have serosanguinous discharge form altered skin integrity located occipital scalp. Wound care was consulted and culture obtained.  Wound cultures grew MRSA and doxy was added.  Seroquel was continued to help with hospital-acquired delirium. Later changed to risperidone and additionally Depakote was added for behavior control related to dementia with good result. Home meds are reviewed and resumed as appropriate. PT/OT consulted and SNF placement suggested. CM consulted to assist with DC planning.      Interval Hx:   Pt is awake , oriented to self only  BP improved after adjusting dose   Risperidone dose is lowered due to increased somnolence.      Case was discussed  with patient's nurse and  on the floor.    Objective/physical exam:  General: In no acute distress, afebrile  Chest: Clear to auscultation bilaterally  Heart: RRR, +S1, S2, no appreciable murmur  Abdomen: Soft, nontender, BS +  MSK: Warm, no lower extremity edema, no clubbing or cyanosis  Neurologic: Awake , oriented to self, Moves all ext spontaneously        VITAL SIGNS: 24 HRS MIN & MAX LAST   Temp  Min: 97.7 °F (36.5 °C)  Max: 98.6 °F (37 °C) 97.9 °F (36.6 °C)   BP  Min: 96/62  Max: 137/84 137/84   Pulse  Min: 56  Max: 83  68   Resp  Min: 20  Max: 20 20   SpO2  Min: 94 %  Max: 97 % 96 %     I have reviewed the following labs:  Recent Labs   Lab 10/19/23  0743   WBC 8.45   RBC 3.22*   HGB 9.6*   HCT 30.0*   MCV 93.2   MCH 29.8   MCHC 32.0*   RDW 15.2      MPV 11.2*     Recent Labs   Lab 10/19/23  0743      K 4.0   CO2 30   BUN 15.4   CREATININE 0.71   CALCIUM 8.1*     Microbiology Results (last 7 days)       ** No results found for the last 168 hours. **             See below for Radiology    Scheduled Med:   B12-levomefolate calcium-B6  1 tablet Oral Daily    carvediloL  3.125 mg Oral BID WM    divalproex ER  500 mg Oral Nightly    donepeziL  5 mg Oral QHS    enoxparin  40 mg Subcutaneous Daily    polyethylene glycol  17 g Oral Daily    risperiDONE  0.5 mg Oral After dinner    sacubitriL-valsartan  1 tablet Oral QHS    senna-docusate 8.6-50 mg  1 tablet Oral BID    tamsulosin  0.4 mg Oral Daily      Continuous Infusions:     PRN Meds:  acetaminophen, aluminum-magnesium hydroxide-simethicone, bisacodyL, cloNIDine, hydrALAZINE, melatonin, naloxone, ondansetron, polyethylene glycol, prochlorperazine, simethicone     Assessment/Plan:  Failure to thrive   Progressive dementia with behavioral disturbance  MRSA posterior right scalp wound, completed treatment  Essential hypertension   Anemia of chronic disease   Cardiomyopathy  Vitamin B12 deficiency  Constipation        Plan-     Awaiting  placement.  Case management is following along.    Medically stable otherwise   Antihypertensives dose modified  given soft BP and has improved since   Risperidone dosage lowered to 0.5 mg qpm to prevent daytime somnolence   Continue PT/OT       VTE prophylaxis: Lovenox    Patient condition:  Fair    Anticipated discharge and Disposition:     SNF/NH placement     All diagnosis and differential diagnosis have been reviewed; assessment and plan has been documented; I have personally reviewed the labs and test results that are presently available; I have reviewed the patients medication list; I have reviewed the consulting providers response and recommendations. I have reviewed or attempted to review medical records based upon their availability    All of the patient's questions have been  addressed and answered. Patient's is agreeable to the above stated plan. I will continue to monitor closely and make adjustments to medical management as needed.      Timur Hartmann MD   10/21/2023

## 2023-10-22 PROCEDURE — 63600175 PHARM REV CODE 636 W HCPCS: Performed by: NURSE PRACTITIONER

## 2023-10-22 PROCEDURE — 51798 US URINE CAPACITY MEASURE: CPT

## 2023-10-22 PROCEDURE — 11000001 HC ACUTE MED/SURG PRIVATE ROOM

## 2023-10-22 PROCEDURE — 25000003 PHARM REV CODE 250: Performed by: INTERNAL MEDICINE

## 2023-10-22 PROCEDURE — 25000003 PHARM REV CODE 250: Performed by: NURSE PRACTITIONER

## 2023-10-22 PROCEDURE — 51701 INSERT BLADDER CATHETER: CPT

## 2023-10-22 RX ORDER — RISPERIDONE 0.25 MG/1
0.5 TABLET ORAL NIGHTLY
Status: DISCONTINUED | OUTPATIENT
Start: 2023-10-22 | End: 2023-10-27 | Stop reason: HOSPADM

## 2023-10-22 RX ORDER — MEMANTINE HYDROCHLORIDE 5 MG/1
5 TABLET ORAL 2 TIMES DAILY
Status: DISCONTINUED | OUTPATIENT
Start: 2023-10-23 | End: 2023-10-27 | Stop reason: HOSPADM

## 2023-10-22 RX ORDER — CYPROHEPTADINE HYDROCHLORIDE 4 MG/1
4 TABLET ORAL
Status: DISCONTINUED | OUTPATIENT
Start: 2023-10-23 | End: 2023-10-27 | Stop reason: HOSPADM

## 2023-10-22 RX ADMIN — ENOXAPARIN SODIUM 40 MG: 40 INJECTION SUBCUTANEOUS at 04:10

## 2023-10-22 RX ADMIN — SENNOSIDES AND DOCUSATE SODIUM 1 TABLET: 50; 8.6 TABLET ORAL at 09:10

## 2023-10-22 RX ADMIN — CARVEDILOL 3.12 MG: 3.12 TABLET, FILM COATED ORAL at 04:10

## 2023-10-22 RX ADMIN — SENNOSIDES AND DOCUSATE SODIUM 1 TABLET: 50; 8.6 TABLET ORAL at 08:10

## 2023-10-22 RX ADMIN — DIVALPROEX SODIUM 500 MG: 500 TABLET, FILM COATED, EXTENDED RELEASE ORAL at 08:10

## 2023-10-22 RX ADMIN — DONEPEZIL HYDROCHLORIDE 5 MG: 5 TABLET, FILM COATED ORAL at 08:10

## 2023-10-22 RX ADMIN — SACUBITRIL AND VALSARTAN 1 TABLET: 24; 26 TABLET, FILM COATED ORAL at 08:10

## 2023-10-22 RX ADMIN — TAMSULOSIN HYDROCHLORIDE 0.4 MG: 0.4 CAPSULE ORAL at 09:10

## 2023-10-22 RX ADMIN — CARVEDILOL 3.12 MG: 3.12 TABLET, FILM COATED ORAL at 09:10

## 2023-10-22 RX ADMIN — Medication 1 TABLET: at 09:10

## 2023-10-22 RX ADMIN — POLYETHYLENE GLYCOL 3350 17 G: 17 POWDER, FOR SOLUTION ORAL at 09:10

## 2023-10-22 RX ADMIN — RISPERIDONE 0.5 MG: 0.25 TABLET, FILM COATED ORAL at 08:10

## 2023-10-22 NOTE — PROGRESS NOTES
[facility] Hospital Medicine Progress Note        Chief Complaint: Inpatient Follow-up for Failure to thrive     HPI:   89 y.o. female with  PMHx of  HTN, CKD stage III, dementia, anemia;, anxiety;  presents to the ED s/p fall out of bed at home with associated head injury. It was reported per the family pt climbed out of bed and fell hitting the back of her head. Pt was recently admitted to our services last month when she had a fall at that itme and discharged to  rehab and was reportedly discharged from rehab the day of arrival in the ED. No reports of LOC, no reports of CP, SOB, N/V/D or fever per family reports.      Initial VS /75 P 76 R 16 T 99 F O2 saturation 98% on room air.  Labs reviewed demonstrated HH 9.2/28.2, BUN/Creat 22.8/1.09. CT of the head without contrast showed no new or worsened intracranial abnormality. Re-demonstrated posterior right parietal scalp hematoma without depressed skull fracture. CT of the cervical spine without contrast no convincing acute cervical spine abnormality. Pt is pleasantly confused and can not provide accurate historical information. Pt received zyprexa  in the ED. Family is desiring NH placement. Pt is admitted to  services for further management.      Pt is noted to have serosanguinous discharge form altered skin integrity located occipital scalp. Wound care was consulted and culture obtained.  Wound cultures grew MRSA and doxy was added.  Seroquel was continued to help with hospital-acquired delirium. Later changed to risperidone and additionally Depakote was added for behavior control related to dementia with good result. Home meds are reviewed and resumed as appropriate. PT/OT consulted and SNF placement suggested. CM consulted to assist with DC planning    Interval Hx:   Pt continues to be very calm and quiet   Noted very little food intake   She is awake , oriented to self, recognizes  her son at bedside but could not  remember his name.   I discussed code status withpt's son. He elected code status be DNR.     Pt was unable to void spontaneously again today. Workman catheter placed.     Case was discussed with patient's nurse and  on the floor.    Objective/physical exam:  General: In no acute distress, afebrile  Chest: Clear to auscultation bilaterally  Heart: RRR, +S1, S2, no appreciable murmur  Abdomen: Soft, nontender, BS +  MSK: Warm, no lower extremity edema, no clubbing or cyanosis  Neurologic: Awake , oriented to self, Moves all ext spontaneously        VITAL SIGNS: 24 HRS MIN & MAX LAST   Temp  Min: 97.7 °F (36.5 °C)  Max: 98.4 °F (36.9 °C) 98.4 °F (36.9 °C)   BP  Min: 105/61  Max: 137/84 132/69   Pulse  Min: 60  Max: 69  69   Resp  Min: 16  Max: 20 20   SpO2  Min: 96 %  Max: 96 % 96 %     I have reviewed the following labs:  Recent Labs   Lab 10/19/23  0743   WBC 8.45   RBC 3.22*   HGB 9.6*   HCT 30.0*   MCV 93.2   MCH 29.8   MCHC 32.0*   RDW 15.2      MPV 11.2*     Recent Labs   Lab 10/19/23  0743      K 4.0   CO2 30   BUN 15.4   CREATININE 0.71   CALCIUM 8.1*     Microbiology Results (last 7 days)       ** No results found for the last 168 hours. **             See below for Radiology    Scheduled Med:   B12-levomefolate calcium-B6  1 tablet Oral Daily    carvediloL  3.125 mg Oral BID WM    divalproex ER  500 mg Oral Nightly    donepeziL  5 mg Oral QHS    enoxparin  40 mg Subcutaneous Daily    polyethylene glycol  17 g Oral Daily    risperiDONE  0.5 mg Oral After dinner    sacubitriL-valsartan  1 tablet Oral QHS    senna-docusate 8.6-50 mg  1 tablet Oral BID    tamsulosin  0.4 mg Oral Daily      Continuous Infusions:     PRN Meds:  acetaminophen, aluminum-magnesium hydroxide-simethicone, bisacodyL, cloNIDine, hydrALAZINE, melatonin, naloxone, ondansetron, polyethylene glycol, prochlorperazine, simethicone     Assessment/Plan:  Failure to thrive   Progressive dementia with behavioral  disturbance  MRSA posterior right scalp wound, completed treatment  Essential hypertension   Anemia of chronic disease   Cardiomyopathy  Vitamin B12 deficiency  Constipation  Urinary retention - s/p Workman catheter placement 10/22/23     Plan-     Awaiting placement.  Case management is following along.    Medically stable otherwise   Antihypertensives dose modified  given soft BP and has improved since   Risperidone dosage lowered to 0.5 mg qpm to prevent daytime somnolence   Continue PT/OT  I discussed code status withpt's son. He elected code status be DNR.     Advanced Directives:  I spent 30 mins of face to face discussion regarding advanced directives and end of life planning which included the patient and patients family, who concluded, that they would like to made DNR status. The family understands the seriousness of her condition and would like to make her end of life decision known.       VTE prophylaxis: Lovenox    Patient condition:  Fair    Anticipated discharge and Disposition:     SNF/NH placement     All diagnosis and differential diagnosis have been reviewed; assessment and plan has been documented; I have personally reviewed the labs and test results that are presently available; I have reviewed the patients medication list; I have reviewed the consulting providers response and recommendations. I have reviewed or attempted to review medical records based upon their availability    All of the patient's questions have been  addressed and answered. Patient's is agreeable to the above stated plan. I will continue to monitor closely and make adjustments to medical management as needed.      Timur Hartmann MD   10/22/2023

## 2023-10-23 LAB
ANION GAP SERPL CALC-SCNC: 7 MEQ/L
BUN SERPL-MCNC: 19.7 MG/DL (ref 9.8–20.1)
CALCIUM SERPL-MCNC: 8.1 MG/DL (ref 8.4–10.2)
CHLORIDE SERPL-SCNC: 102 MMOL/L (ref 98–111)
CO2 SERPL-SCNC: 32 MMOL/L (ref 23–31)
CREAT SERPL-MCNC: 0.74 MG/DL (ref 0.55–1.02)
CREAT/UREA NIT SERPL: 27
GFR SERPLBLD CREATININE-BSD FMLA CKD-EPI: >60 MLS/MIN/1.73/M2
GLUCOSE SERPL-MCNC: 94 MG/DL (ref 75–121)
POTASSIUM SERPL-SCNC: 4.2 MMOL/L (ref 3.5–5.1)
SODIUM SERPL-SCNC: 141 MMOL/L (ref 132–146)

## 2023-10-23 PROCEDURE — 63600175 PHARM REV CODE 636 W HCPCS: Performed by: NURSE PRACTITIONER

## 2023-10-23 PROCEDURE — 80048 BASIC METABOLIC PNL TOTAL CA: CPT | Performed by: INTERNAL MEDICINE

## 2023-10-23 PROCEDURE — 11000001 HC ACUTE MED/SURG PRIVATE ROOM

## 2023-10-23 PROCEDURE — 25000003 PHARM REV CODE 250: Performed by: NURSE PRACTITIONER

## 2023-10-23 PROCEDURE — 25000003 PHARM REV CODE 250: Performed by: INTERNAL MEDICINE

## 2023-10-23 RX ADMIN — ENOXAPARIN SODIUM 40 MG: 40 INJECTION SUBCUTANEOUS at 05:10

## 2023-10-23 RX ADMIN — MEMANTINE 5 MG: 5 TABLET ORAL at 09:10

## 2023-10-23 RX ADMIN — SACUBITRIL AND VALSARTAN 1 TABLET: 24; 26 TABLET, FILM COATED ORAL at 10:10

## 2023-10-23 RX ADMIN — CARVEDILOL 3.12 MG: 3.12 TABLET, FILM COATED ORAL at 05:10

## 2023-10-23 RX ADMIN — TAMSULOSIN HYDROCHLORIDE 0.4 MG: 0.4 CAPSULE ORAL at 09:10

## 2023-10-23 RX ADMIN — Medication 1 TABLET: at 09:10

## 2023-10-23 RX ADMIN — CYPROHEPTADINE HYDROCHLORIDE 4 MG: 4 TABLET ORAL at 11:10

## 2023-10-23 RX ADMIN — CYPROHEPTADINE HYDROCHLORIDE 4 MG: 4 TABLET ORAL at 05:10

## 2023-10-23 RX ADMIN — RISPERIDONE 0.5 MG: 0.25 TABLET, FILM COATED ORAL at 10:10

## 2023-10-23 RX ADMIN — CARVEDILOL 3.12 MG: 3.12 TABLET, FILM COATED ORAL at 09:10

## 2023-10-23 RX ADMIN — SENNOSIDES AND DOCUSATE SODIUM 1 TABLET: 50; 8.6 TABLET ORAL at 09:10

## 2023-10-23 RX ADMIN — MEMANTINE 5 MG: 5 TABLET ORAL at 10:10

## 2023-10-23 RX ADMIN — DIVALPROEX SODIUM 500 MG: 500 TABLET, FILM COATED, EXTENDED RELEASE ORAL at 10:10

## 2023-10-23 NOTE — PHYSICIAN QUERY
PT Name: Danielle Loera  MR #: [id number]    DOCUMENTATION CLARIFICATION     CDS/:  Chucho Ponce RN, CDS                   Contact Information:  [email or web address]    This form is a permanent document in the medical record.     Query Date: 2023    By submitting this query, we are merely seeking further clarification of documentation.. Please utilize your independent clinical judgment when addressing the question(s) below.    The medical record contains the following:   Indicators  Supporting Clinical Findings     Location in Medical Record   X Registered Dietician Diagnosis Malnutrition Level: moderate       Malnutrition Context: acute illness or injury   Nutrition PN 10/20   X Energy Intake Less than 75% for greater than 7 days    Nutrition PN 10/20     X Weight Loss Greater than 5% in 1 month   Nutrition PN 10/20     X Fat Loss Does not meet criteria   Nutrition PN 10/20     X Muscle Loss Does not meet criteria   Nutrition PN 10/20     X Edema/Fluid Accumulation Does not meet criteria   Nutrition PN 10/20      Reduced  Strength (by dynamometer)     X Weight, BMI, Usual Body Weight Last Weight: 64.9 kg (143 lb)     BMI (Calculated): 22.4    Usual Body Weight (UBW), k.7 kg   Nutrition PN 10/20    Delayed Wound Healing     X Acute or Chronic Illness Failure to thrive   Progressive dementia with behavioral disturbance  MRSA posterior right scalp wound, completed treatment  Essential hypertension   Anemia of chronic disease   Cardiomyopathy  Vitamin B12 deficiency  Constipation  Urinary retention - s/p Workman catheter placement 10/22/23   HM PN 10/22    Social or Environmental Circumstances     X Treatment Recommendations:  Continue current diet as tolerated  Continue Boost Plus BID (provides 360 kcal and 14 g protein per container)  Encouraged adequate PO intake  Monitor appetite/PO intake, weight, and labs   Consider adding appetite stimulant if intake of meals <50%  consistently   Nutrition PN 10/20   X Other 10/3/2023: Pt unable to answer questions clearly. Per tray observation, pt has <25% PO intake of breakfast. Pt reported not being hungry. Will add Boost Plus BID as preventative MNT. Encouraged adequate PO intake. No reported N/V. Last BM documented as 9/29/2023. Per EMR, pt weighed 71.7 kg on 9/6/2023 (9.5% wt loss in 1 month, significant). Will monitor.    10/6/2023: Spoke with nurse.The nurse reports pt refused breakfast, presumed poor appetite. The nurse denies N/V/D/C. Last BM noted. Boost Plus ordered. Encouraged adequate PO intake.     10/11 pt sleeping, ate about 25% of lunch per nursing, drinking some boost     10/13 pt sleeping, nurse reports pt not eating much, not drinking the boost today either; discussed adding an appetite stimulant with nurse; says pt is usually more awake but she has been started on a new medication that is making her tired     10/17 fair appetite, tolerating oral diet, eating better     10/20 nurse reports she is not eating much today, but drinking the boost max   Nutrition PN 10/20     Academy of Nutrition and Dietetics (Academy) and the American Society for Parenteral and Enteral Nutrition (A.S.P.E.N.) Clinical Characteristics to support Malnutrition      Criteria for mild malnutrition is defined as 1 characteristic outlined above within the established moderate or severe parameters.  A minimum of 2 out of the 6 characteristics noted above are recommended for a diagnosis of moderate or severe malnutrition.  Chronic illness/injury is a disease/condition lasting 3 months or longer.    The noted clinical guidelines are only system guidelines and do not replace the providers clinical judgment.    Provider, please clarify/confirm the nutrition diagnosis as noted above.    [ x ] Moderate Malnutrition - a minimum of 2 of the 6 moderate malnutrition characteristics noted above      [  ] Malnutrition ruled out     [  ] Other Nutritional  Diagnosis (please specify): _______       Please document in your progress notes daily for the duration of treatment until resolved and  include in your discharge summary.      References:    KRAIG White, & JAQUELINE Menezes. (2022, April). Assessment and management of anorexia and cachexia in palliative care. Retrieved May 23, 2022, from [email or web address]-and-management-[email or web address]-[email or web address]-cachexia-[email or web address]-[email or web address]-care?[email or web address]_[email or web address]     KALINA Stokes, PhD, RD, ROMAINE, ЕКАТЕРИНА Nascimento, PhD, RN, ERINN Farmer MD, PhD, Aneesh GARCIA A., MS, RD, [facility], ROHIT Carter, MS, RD, The Academy Malnutrition Work Group, The A.S.P.E.N. Board of Directors. (2012). Consensus Statement: Academy of Nutrition and Dietetics and American Society for Parenteral and Enteral Nutrition: Characteristics Recommended for the Identification and Documentation of Adult Malnutrition (Undernutrition). Journal of Parenteral and Enteral Nutrition, 36(3), 275-283. doi:10.1177/1513190123307084     Form No. [id number]

## 2023-10-23 NOTE — PROGRESS NOTES
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 5/12/2023    Primary hypertension 5/12/2023    Severe dementia without behavioral disturbance, psychotic disturbance, mood disturbance, or anxiety 2/23/2023    Vitamin D deficiency 5/12/2023       Social History     Socioeconomic History    Marital status:     Number of children: 4   Occupational History    Occupation: retied   Tobacco Use    Smoking status: Never     Passive exposure: Never    Smokeless tobacco: Never   Substance and Sexual Activity    Alcohol use: Never    Drug use: Never    Sexual activity: Not Currently     Social Determinants of Health     Financial Resource Strain: Low Risk  (8/2/2023)    Overall Financial Resource Strain (CARDIA)     Difficulty of Paying Living Expenses: Not hard at all   Food Insecurity: No Food Insecurity (8/2/2023)    Hunger Vital Sign     Worried About Running Out of Food in the Last Year: Never true     Ran Out of Food in the Last Year: Never true   Transportation Needs: No Transportation Needs (8/2/2023)    PRAPARE - Transportation     Lack of Transportation (Medical): No     Lack of Transportation (Non-Medical): No   Physical Activity: Insufficiently Active (8/2/2023)    Exercise Vital Sign     Days of Exercise per Week: 1 day     Minutes of Exercise per Session: 10 min   Stress: No Stress Concern Present (8/2/2023)    [place] [place] [employer] - Occupational Stress Questionnaire     Feeling of Stress : Not at all   Social Connections: Moderately Isolated (8/2/2023)    Social Connection and Isolation Panel [NHANES]     Frequency of Communication with Friends and Family: More than three times a week     Frequency of Social Gatherings with Friends and Family: More than three times a week      Attends Cheondo[employer] Services: More than 4 times per year     Active Member of Clubs or Organizations: No     Attends Club or Organization Meetings: Never     Marital Status:    Housing Stability: Low Risk  (8/2/2023)    Housing Stability Vital Sign     Unable to Pay for Housing in the Last Year: No     Number of Places Lived in the Last Year: 1     Unstable Housing in the Last Year: No       Precautions:     Allergies as of 09/29/2023    (No Known Allergies)       WOC Assessment Details/Treatment        10/23/23 1118   WOCN Assessment   Visit Date 10/23/23   Visit Time 1118   Consult Type New   WOCN Speciality Wound   Intervention assessed;changed   Teaching on-going   Skin Interventions   Device Skin Pressure Protection absorbent pad utilized/changed        Altered Skin Integrity 10/21/23 2000 Coccyx #2   Date First Assessed/Time First Assessed: 10/21/23 2000   Altered Skin Integrity Present on Admission - Did Patient arrive to the hospital with altered skin?: suspected hospital acquired  Location: Coccyx  Wound Number: #2  Is this injury device relate...   Wound Image    Description of Altered Skin Integrity Purple or maroon localized area of discolored intact skin or non-intact skin or a blood-filled blister.   Dressing Appearance Dry;Intact;Clean   Drainage Amount Small   Drainage Characteristics/Odor Serosanguineous   Appearance Red;Yellow   Tissue loss description Full thickness   Red (%), Wound Tissue Color 50 %   Yellow (%), Wound Tissue Color 50 %   Wound Edges Irregular   Wound Length (cm) 1.5 cm   Wound Width (cm) 1 cm   Wound Depth (cm) 0.3 cm   Wound Volume (cm^3) 0.45 cm^3   Wound Surface Area (cm^2) 1.5 cm^2   Care Cleansed with:;Sterile normal saline   Dressing Applied     WOCN consulted for sacrum. No family at bedside. Treatment recommendations put in to place. Sacrum: Cleanse with NS. Apply calcium alginate with Ag, cover with 4 x 4, ABD pad, secure with medipore tape. Change daily and PRN  drainage. Nursing to continue with preventative measures. LUCRECIA mattress ordered. Will follow up.  10/23/2023

## 2023-10-23 NOTE — PROGRESS NOTES
[facility] Hospital Medicine Progress Note        Chief Complaint: Inpatient Follow-up for  Failure to thrive     HPI:   89 y.o. female with  PMHx of  HTN, CKD stage III, dementia, anemia;, anxiety;  presents to the ED s/p fall out of bed at home with associated head injury. It was reported per the family pt climbed out of bed and fell hitting the back of her head. Pt was recently admitted to our services last month when she had a fall at that itme and discharged to  rehab and was reportedly discharged from rehab the day of arrival in the ED. No reports of LOC, no reports of CP, SOB, N/V/D or fever per family reports.      Initial VS /75 P 76 R 16 T 99 F O2 saturation 98% on room air.  Labs reviewed demonstrated HH 9.2/28.2, BUN/Creat 22.8/1.09. CT of the head without contrast showed no new or worsened intracranial abnormality. Re-demonstrated posterior right parietal scalp hematoma without depressed skull fracture. CT of the cervical spine without contrast no convincing acute cervical spine abnormality. Pt is pleasantly confused and can not provide accurate historical information. Pt received zyprexa  in the ED. Family is desiring NH placement. Pt is admitted to  services for further management.      Pt is noted to have serosanguinous discharge form altered skin integrity located occipital scalp. Wound care was consulted and culture obtained.  Wound cultures grew MRSA and doxy was added.  Seroquel was continued to help with hospital-acquired delirium. Later changed to risperidone and additionally Depakote was added for behavior control related to dementia with good result. Home meds are reviewed and resumed as appropriate. PT/OT consulted and SNF placement suggested. CM consulted to assist with DC planningd      Interval Hx:   No acute events reported overnight  Appetite continues to be poor.  Periactin added  Workman catheter placed on 10/22/23 due to unable to void  spontaneously     Awaiting placement   Case was discussed with patient's nurse and  on the floor.    Objective/physical exam:  General: In no acute distress, afebrile  Chest: Clear to auscultation bilaterally  Heart: RRR, +S1, S2, no appreciable murmur  Abdomen: Soft, nontender, BS +  MSK: Warm, no lower extremity edema, no clubbing or cyanosis  Neurologic: Awake , oriented to self, Moves all ext spontaneously     VITAL SIGNS: 24 HRS MIN & MAX LAST   Temp  Min: 97.7 °F (36.5 °C)  Max: 98 °F (36.7 °C) 97.7 °F (36.5 °C)   BP  Min: 110/70  Max: 143/85 110/70   Pulse  Min: 64  Max: 71  64   Resp  Min: 18  Max: 20 18   SpO2  Min: 95 %  Max: 97 % 96 %     I have reviewed the following labs:  Recent Labs   Lab 10/19/23  0743   WBC 8.45   RBC 3.22*   HGB 9.6*   HCT 30.0*   MCV 93.2   MCH 29.8   MCHC 32.0*   RDW 15.2      MPV 11.2*     Recent Labs   Lab 10/19/23  0743 10/23/23  0512    141   K 4.0 4.2   CO2 30 32*   BUN 15.4 19.7   CREATININE 0.71 0.74   CALCIUM 8.1* 8.1*     Microbiology Results (last 7 days)       ** No results found for the last 168 hours. **             See below for Radiology    Scheduled Med:   B12-levomefolate calcium-B6  1 tablet Oral Daily    carvediloL  3.125 mg Oral BID WM    cyproheptadine  4 mg Oral TID AC    divalproex ER  500 mg Oral Nightly    enoxparin  40 mg Subcutaneous Daily    memantine  5 mg Oral BID    polyethylene glycol  17 g Oral Daily    risperiDONE  0.5 mg Oral QHS    sacubitriL-valsartan  1 tablet Oral QHS    senna-docusate 8.6-50 mg  1 tablet Oral BID    tamsulosin  0.4 mg Oral Daily      Continuous Infusions:     PRN Meds:  acetaminophen, aluminum-magnesium hydroxide-simethicone, bisacodyL, cloNIDine, hydrALAZINE, melatonin, naloxone, ondansetron, polyethylene glycol, prochlorperazine, simethicone     Assessment/Plan:  Failure to thrive   Progressive dementia with behavioral disturbance  MRSA posterior right scalp wound, completed treatment  Essential  hypertension   Anemia of chronic disease   Cardiomyopathy  Vitamin B12 deficiency  Constipation  Urinary retention - s/p Workman catheter placement 10/22/23     Plan-     Awaiting placement.  Case management is following along.    Medically stable otherwise   Antihypertensives dose modified  given soft BP and has improved since   Risperidone dosage lowered to 0.5 mg qpm to prevent daytime somnolence   Periactin added for appetite stimulant   Workman catheter placed on 10/22/23 due to unable to void spontaneously   Continue PT/OT  I discussed code status withpt's son. He elected code status be DNR.     VTE prophylaxis: Lovenox     Patient condition:  Fair     Anticipated discharge and Disposition:     SNF/NH placement         All diagnosis and differential diagnosis have been reviewed; assessment and plan has been documented; I have personally reviewed the labs and test results that are presently available; I have reviewed the patients medication list; I have reviewed the consulting providers response and recommendations. I have reviewed or attempted to review medical records based upon their availability    All of the patient's questions have been  addressed and answered. Patient's is agreeable to the above stated plan. I will continue to monitor closely and make adjustments to medical management as needed.  ____________________    Timur Hartmann MD   10/23/2023

## 2023-10-23 NOTE — PT/OT/SLP PROGRESS
Physical Therapy      Patient Name:  Danielle Loera   MRN:  [id number]    Patient not seen today secondary to Patient fatigue, Patient unwilling to participate. Will follow-up as schedule permits.

## 2023-10-24 PROCEDURE — 25000003 PHARM REV CODE 250: Performed by: INTERNAL MEDICINE

## 2023-10-24 PROCEDURE — 25000003 PHARM REV CODE 250: Performed by: NURSE PRACTITIONER

## 2023-10-24 PROCEDURE — 11000001 HC ACUTE MED/SURG PRIVATE ROOM

## 2023-10-24 RX ADMIN — DIVALPROEX SODIUM 500 MG: 500 TABLET, FILM COATED, EXTENDED RELEASE ORAL at 08:10

## 2023-10-24 RX ADMIN — SENNOSIDES AND DOCUSATE SODIUM 1 TABLET: 50; 8.6 TABLET ORAL at 08:10

## 2023-10-24 RX ADMIN — SACUBITRIL AND VALSARTAN 1 TABLET: 24; 26 TABLET, FILM COATED ORAL at 08:10

## 2023-10-24 RX ADMIN — MEMANTINE 5 MG: 5 TABLET ORAL at 08:10

## 2023-10-24 RX ADMIN — RISPERIDONE 0.5 MG: 0.25 TABLET, FILM COATED ORAL at 08:10

## 2023-10-24 RX ADMIN — CYPROHEPTADINE HYDROCHLORIDE 4 MG: 4 TABLET ORAL at 07:10

## 2023-10-24 NOTE — PLAN OF CARE
Updates [facility] to [facility]     1000: Spoke to Kathleen with  [facility], unable to accept patient for long term placement due to financials. Spoke to the son Soy, stated currently working with [facility]. Reached out to [facility], [facility] left. Currently waiting call back.     1135: [facility], no answer.     1200: [facility] stated they are unable to accept patient. Notified patients son Soy, he will be going to [facility] today. Will follow-up this afternoon.     1430: Attempted to contact [facility], carrington left. Waiting call back.     1455: Spoke to [facility], [facility] will notify tomorrow if able to accept patient. Attempted to contact [facility] again, voicemail left.     1500: Michelle with [facility] stated needs to speak with administration regarding admission. Will notify on decision.

## 2023-10-24 NOTE — PROGRESS NOTES
[facility] Hospital Medicine Progress Note        Chief Complaint: Inpatient Follow-up for failure to thrive     HPI:   89 y.o. female with  PMHx of  HTN, CKD stage III, dementia, anemia;, anxiety;  presents to the ED s/p fall out of bed at home with associated head injury. It was reported per the family pt climbed out of bed and fell hitting the back of her head. Pt was recently admitted to our services last month when she had a fall at that itme and discharged to  rehab and was reportedly discharged from rehab the day of arrival in the ED. No reports of LOC, no reports of CP, SOB, N/V/D or fever per family reports.      Initial VS /75 P 76 R 16 T 99 F O2 saturation 98% on room air.  Labs reviewed demonstrated HH 9.2/28.2, BUN/Creat 22.8/1.09. CT of the head without contrast showed no new or worsened intracranial abnormality. Re-demonstrated posterior right parietal scalp hematoma without depressed skull fracture. CT of the cervical spine without contrast no convincing acute cervical spine abnormality. Pt is pleasantly confused and can not provide accurate historical information. Pt received zyprexa  in the ED. Family is desiring NH placement. Pt is admitted to  services for further management.      Pt is noted to have serosanguinous discharge form altered skin integrity located occipital scalp. Wound care was consulted and culture obtained.  Wound cultures grew MRSA and doxy was added.  Seroquel was continued to help with hospital-acquired delirium. Later changed to risperidone and additionally Depakote was added for behavior control related to dementia with good result. Home meds are reviewed and resumed as appropriate. PT/OT consulted and SNF placement suggested. CM consulted to assist with DC planning. Appetite continues to be poor.  Periactin added. Workman catheter placed on 10/22/23 due to unable to void spontaneously. Flomax continued.        Interval Hx:   No acute  events reported overnight   Pt is calm and cooperative   Oriented to self only.  Intermittent soft BP noted but MAP is favorable       Case was discussed with patient's nurse and  on the floor.    Objective/physical exam:  General: In no acute distress, afebrile  Chest: Clear to auscultation bilaterally  Heart: RRR, +S1, S2, no appreciable murmur  Abdomen: Soft, nontender, BS +  MSK: Warm, no lower extremity edema, no clubbing or cyanosis  Neurologic: Awake , oriented to self, Moves all ext spontaneously     VITAL SIGNS: 24 HRS MIN & MAX LAST   Temp  Min: 97.9 °F (36.6 °C)  Max: 99.5 °F (37.5 °C) 98.4 °F (36.9 °C)   BP  Min: 103/49  Max: 117/63 (!) 108/57   Pulse  Min: 68  Max: 102  87   Resp  Min: 15  Max: 18 18   SpO2  Min: 95 %  Max: 98 % 96 %     I have reviewed the following labs:  Recent Labs   Lab 10/19/23  0743   WBC 8.45   RBC 3.22*   HGB 9.6*   HCT 30.0*   MCV 93.2   MCH 29.8   MCHC 32.0*   RDW 15.2      MPV 11.2*     Recent Labs   Lab 10/19/23  0743 10/23/23  0512    141   K 4.0 4.2   CO2 30 32*   BUN 15.4 19.7   CREATININE 0.71 0.74   CALCIUM 8.1* 8.1*     Microbiology Results (last 7 days)       ** No results found for the last 168 hours. **             See below for Radiology    Scheduled Med:   B12-levomefolate calcium-B6  1 tablet Oral Daily    carvediloL  3.125 mg Oral BID WM    cyproheptadine  4 mg Oral TID AC    divalproex ER  500 mg Oral Nightly    enoxparin  40 mg Subcutaneous Daily    memantine  5 mg Oral BID    polyethylene glycol  17 g Oral Daily    risperiDONE  0.5 mg Oral QHS    sacubitriL-valsartan  1 tablet Oral QHS    senna-docusate 8.6-50 mg  1 tablet Oral BID    tamsulosin  0.4 mg Oral Daily      Continuous Infusions:     PRN Meds:  acetaminophen, aluminum-magnesium hydroxide-simethicone, bisacodyL, cloNIDine, hydrALAZINE, melatonin, naloxone, ondansetron, polyethylene glycol, prochlorperazine, simethicone     Assessment/Plan:  Failure to thrive   Progressive  dementia with behavioral disturbance  MRSA posterior right scalp wound, completed treatment  Essential hypertension with intermittent soft BP  Anemia of chronic disease   Cardiomyopathy  Vitamin B12 deficiency  Constipation  Urinary retention - s/p Workman catheter placement 10/22/23     Plan-     Awaiting placement.  Case management is following along.    Medically stable otherwise   Antihypertensives dose modified  given soft BP and has improved since   Risperidone dosage lowered to 0.5 mg qpm to prevent daytime somnolence   Periactin added for appetite stimulant   Workman catheter placed on 10/22/23 due to unable to void spontaneously   Continue PT/OT  I discussed code status with pt's son. He elected code status be DNR.      VTE prophylaxis: Lovenox     Patient condition:  Fair     Anticipated discharge and Disposition:     SNF/NH placement          All diagnosis and differential diagnosis have been reviewed; assessment and plan has been documented; I have personally reviewed the labs and test results that are presently available; I have reviewed the patients medication list; I have reviewed the consulting providers response and recommendations. I have reviewed or attempted to review medical records based upon their availability    All of the patient's questions have been  addressed and answered. Patient's is agreeable to the above stated plan. I will continue to monitor closely and make adjustments to medical management as needed.  ________________________________________________________    Timur Hartmann MD   10/24/2023

## 2023-10-24 NOTE — PLAN OF CARE
Problem: Adult Inpatient Plan of Care  Goal: Plan of Care Review  Outcome: Ongoing, Progressing  Goal: Patient-Specific Goal (Individualized)  Outcome: Ongoing, Progressing  Goal: Absence of Hospital-Acquired Illness or Injury  Outcome: Ongoing, Progressing  Goal: Optimal Comfort and Wellbeing  Outcome: Ongoing, Progressing     Problem: Skin Injury Risk Increased  Goal: Skin Health and Integrity  Outcome: Ongoing, Progressing     Problem: Fall Injury Risk  Goal: Absence of Fall and Fall-Related Injury  Outcome: Ongoing, Progressing     Problem: Impaired Wound Healing  Goal: Optimal Wound Healing  Outcome: Ongoing, Progressing     Problem: Infection  Goal: Absence of Infection Signs and Symptoms  Outcome: Ongoing, Progressing

## 2023-10-24 NOTE — PROGRESS NOTES
Inpatient Nutrition Assessment    Admit Date: 9/29/2023   Total duration of encounter: 25 days     Nutrition Recommendation/Prescription     Continue current diet as tolerated  Continue Boost Plus BID (provides 360 kcal and 14 g protein per container)  Encouraged adequate PO intake  Consider adding appetite stimulant if intake of meals <50% consistently  If aggressive treatment desired, consult RD for additional recs    Communication of Recommendations: reviewed with nurse    Nutrition Assessment     Malnutrition Assessment/Nutrition-Focused Physical Exam    Malnutrition Context: acute illness or injury (10/20/23 1530)  Malnutrition Level: moderate (10/20/23 1530)  Energy Intake (Malnutrition): less than 75% for greater than 7 days (10/20/23 1530)  Weight Loss (Malnutrition): greater than 5% in 1 month (10/03/23 1455)  Subcutaneous Fat (Malnutrition): other (see comments) (Does not meet criteria) (10/03/23 1455)           Muscle Mass (Malnutrition): other (see comments) (Does not meet criteria) (10/03/23 1455)                          Fluid Accumulation (Malnutrition): other (see comments) (Does not meet criteria) (10/03/23 1455)        A minimum of two characteristics is recommended for diagnosis of either severe or non-severe malnutrition.    Chart Review    Reason Seen: continuous nutrition monitoring and follow-up wound    Malnutrition Screening Tool Results   Have you recently lost weight without trying?: No  Have you been eating poorly because of a decreased appetite?: No   MST Score: 0   Diagnosis:  Acute encephalopathy in the setting of dementia  Recent closed head injury from fall, resulting in hematoma with confusion  MRSA positive scalp wound cultures  Dementia with behavioral disturbances    Relevant Medical History:   HTN  CKD stage III  Dementia  Anemia  Vitamin D deficiency    Nutrition-Related Medications:   Scheduled Meds:   B12-levomefolate calcium-B6  1 tablet Oral Daily    carvediloL  3.125 mg  Oral BID WM    cyproheptadine  4 mg Oral TID AC    divalproex ER  500 mg Oral Nightly    enoxparin  40 mg Subcutaneous Daily    memantine  5 mg Oral BID    polyethylene glycol  17 g Oral Daily    risperiDONE  0.5 mg Oral QHS    sacubitriL-valsartan  1 tablet Oral QHS    senna-docusate 8.6-50 mg  1 tablet Oral BID    tamsulosin  0.4 mg Oral Daily     Continuous Infusions:  PRN Meds:.acetaminophen, aluminum-magnesium hydroxide-simethicone, bisacodyL, cloNIDine, hydrALAZINE, melatonin, naloxone, ondansetron, polyethylene glycol, prochlorperazine, simethicone    Calorie Containing IV Medications: no significant kcals from medications at this time    Nutrition-Related Labs:  10/3/2023: Gluc 94  10/6/2023: no new labs  10/11: Ca 8.2  10/13: no updated  10/19: Ca 8.1  10/23: Ca 8.1    Diet/PN Order: Diet heart healthy  Oral Supplement Order: Boost Max  Tube Feeding Order: none  Appetite/Oral Intake: fair/25-50% of meals  Factors Affecting Nutritional Intake: decreased appetite  Food/Evangelical/Cultural Preferences: unable to obtain  Food Allergies: no known food allergies    Wound(s):      Altered Skin Integrity 10/21/23 2000 Coccyx #2-Tissue loss description: Full thickness       Altered Skin Integrity 10/02/23 posterior Head Other (comment)-Tissue loss description: Full thickness noted    Last Bowel Movement: 10/23/23    Comments    10/3/2023: Pt unable to answer questions clearly. Per tray observation, pt has <25% PO intake of breakfast. Pt reported not being hungry. Will add Boost Plus BID as preventative MNT. Encouraged adequate PO intake. No reported N/V. Last BM documented as 9/29/2023. Per EMR, pt weighed 71.7 kg on 9/6/2023 (9.5% wt loss in 1 month, significant). Will monitor.    10/6/2023: Spoke with nurse.The nurse reports pt refused breakfast, presumed poor appetite. The nurse denies N/V/D/C. Last BM noted. Boost Plus ordered. Encouraged adequate PO intake.    10/11 pt sleeping, ate about 25% of lunch per  "nursing, drinking some boost    10/13 pt sleeping, nurse reports pt not eating much, not drinking the boost today either; discussed adding an appetite stimulant with nurse; says pt is usually more awake but she has been started on a new medication that is making her tired    10/17 fair appetite, tolerating oral diet, eating better    10/20 nurse reports she is not eating much today, but drinking the boost max    10/24 nurse reports she has not eaten today or drank any boost, is sleeping most of the day but will attempt to feed her this afternoon    Anthropometrics    Height: 5' 7" (170.2 cm) Height Method: Stated  Last Weight: 64.9 kg (143 lb) (23) Weight Method: Standard Scale  BMI (Calculated): 22.4  BMI Classification: normal (BMI 18.5-24.9)     Ideal Body Weight (IBW), Female: 135 lb     % Ideal Body Weight, Female (lb): 105.93 %                    Usual Body Weight (UBW), k.7 kg  % Usual Body Weight: 90.66     Usual Weight Provided By: EMR weight history    Wt Readings from Last 5 Encounters:   23 64.9 kg (143 lb)   23 71.7 kg (158 lb)   23 71.7 kg (158 lb)   23 75.3 kg (166 lb)   23 69 kg (152 lb 1.9 oz)     Weight Change(s) Since Admission:  Admit Weight: 64.9 kg (143 lb) (23)  2023: 64.9 kg  10/6/2023: no new wts    Estimated Needs    Weight Used For Calorie Calculations: 64.9 kg (143 lb 1.3 oz)  Energy Calorie Requirements (kcal): 8367-2158 (25-30 kcal/kg)  Energy Need Method: Kcal/kg  Weight Used For Protein Calculations: 64.9 kg (143 lb 1.3 oz)  Protein Requirements: 65-78 (1.0-1.2 g/kg)  Fluid Requirements (mL): 1622 (1 mL/kcal)  Temp (24hrs), Av.5 °F (36.9 °C), Min:97.9 °F (36.6 °C), Max:99.5 °F (37.5 °C)       Enteral Nutrition    Patient not receiving enteral nutrition at this time.    Parenteral Nutrition    Patient not receiving parenteral nutrition support at this time.    Evaluation of Received Nutrient Intake    Calories: not " meeting estimated needs  Protein: not meeting estimated needs    Patient Education    Not applicable.    Nutrition Diagnosis     PES: Inadequate oral intake related to acute illness as evidenced by <50% intake of meals since admit. (continues)    Interventions/Goals     Intervention(s): general/healthful diet and commercial beverage  Goal: Meet greater than 75% of nutritional needs by follow-up. (goal progressing)    Monitoring & Evaluation     Dietitian will monitor food and beverage intake, weight, electrolyte/renal panel, glucose/endocrine profile, and gastrointestinal profile.  Nutrition Risk/Follow-Up: high (follow-up in 1-4 days)   Please consult if re-assessment needed sooner.

## 2023-10-24 NOTE — PLAN OF CARE
Problem: Adult Inpatient Plan of Care  Goal: Plan of Care Review  Outcome: Ongoing, Progressing  Flowsheets (Taken 10/24/2023 0944)  Plan of Care Reviewed With: patient  Goal: Patient-Specific Goal (Individualized)  Outcome: Ongoing, Progressing  Goal: Optimal Comfort and Wellbeing  Outcome: Ongoing, Progressing  Intervention: Monitor Pain and Promote Comfort  Flowsheets (Taken 10/24/2023 0944)  Pain Management Interventions:   care clustered   position adjusted   pillow support provided     Problem: Skin Injury Risk Increased  Goal: Skin Health and Integrity  Outcome: Ongoing, Progressing

## 2023-10-25 PROCEDURE — 97530 THERAPEUTIC ACTIVITIES: CPT | Mod: CO

## 2023-10-25 PROCEDURE — 97535 SELF CARE MNGMENT TRAINING: CPT | Mod: CO

## 2023-10-25 PROCEDURE — 97164 PT RE-EVAL EST PLAN CARE: CPT

## 2023-10-25 PROCEDURE — 63600175 PHARM REV CODE 636 W HCPCS: Performed by: NURSE PRACTITIONER

## 2023-10-25 PROCEDURE — 25000003 PHARM REV CODE 250: Performed by: INTERNAL MEDICINE

## 2023-10-25 PROCEDURE — 25000003 PHARM REV CODE 250: Performed by: NURSE PRACTITIONER

## 2023-10-25 PROCEDURE — 11000001 HC ACUTE MED/SURG PRIVATE ROOM

## 2023-10-25 RX ADMIN — CYPROHEPTADINE HYDROCHLORIDE 4 MG: 4 TABLET ORAL at 09:10

## 2023-10-25 RX ADMIN — MEMANTINE 5 MG: 5 TABLET ORAL at 07:10

## 2023-10-25 RX ADMIN — POLYETHYLENE GLYCOL 3350 17 G: 17 POWDER, FOR SOLUTION ORAL at 09:10

## 2023-10-25 RX ADMIN — Medication 1 TABLET: at 09:10

## 2023-10-25 RX ADMIN — CARVEDILOL 3.12 MG: 3.12 TABLET, FILM COATED ORAL at 09:10

## 2023-10-25 RX ADMIN — TAMSULOSIN HYDROCHLORIDE 0.4 MG: 0.4 CAPSULE ORAL at 09:10

## 2023-10-25 RX ADMIN — MEMANTINE 5 MG: 5 TABLET ORAL at 09:10

## 2023-10-25 RX ADMIN — SACUBITRIL AND VALSARTAN 1 TABLET: 24; 26 TABLET, FILM COATED ORAL at 07:10

## 2023-10-25 RX ADMIN — SENNOSIDES AND DOCUSATE SODIUM 1 TABLET: 50; 8.6 TABLET ORAL at 07:10

## 2023-10-25 RX ADMIN — CYPROHEPTADINE HYDROCHLORIDE 4 MG: 4 TABLET ORAL at 05:10

## 2023-10-25 RX ADMIN — CYPROHEPTADINE HYDROCHLORIDE 4 MG: 4 TABLET ORAL at 12:10

## 2023-10-25 RX ADMIN — SENNOSIDES AND DOCUSATE SODIUM 1 TABLET: 50; 8.6 TABLET ORAL at 09:10

## 2023-10-25 RX ADMIN — CARVEDILOL 3.12 MG: 3.12 TABLET, FILM COATED ORAL at 05:10

## 2023-10-25 RX ADMIN — ENOXAPARIN SODIUM 40 MG: 40 INJECTION SUBCUTANEOUS at 05:10

## 2023-10-25 RX ADMIN — RISPERIDONE 0.5 MG: 0.25 TABLET, FILM COATED ORAL at 07:10

## 2023-10-25 RX ADMIN — DIVALPROEX SODIUM 500 MG: 500 TABLET, FILM COATED, EXTENDED RELEASE ORAL at 07:10

## 2023-10-25 NOTE — PROGRESS NOTES
[facility] Hospital Medicine Progress Note        Chief Complaint: Inpatient Follow-up for  failure to thrive     HPI:   89 y.o. female with  PMHx of  HTN, CKD stage III, dementia, anemia;, anxiety;  presents to the ED s/p fall out of bed at home with associated head injury. It was reported per the family pt climbed out of bed and fell hitting the back of her head. Pt was recently admitted to our services last month when she had a fall at that itme and discharged to  rehab and was reportedly discharged from rehab the day of arrival in the ED. No reports of LOC, no reports of CP, SOB, N/V/D or fever per family reports.      Initial VS /75 P 76 R 16 T 99 F O2 saturation 98% on room air.  Labs reviewed demonstrated HH 9.2/28.2, BUN/Creat 22.8/1.09. CT of the head without contrast showed no new or worsened intracranial abnormality. Re-demonstrated posterior right parietal scalp hematoma without depressed skull fracture. CT of the cervical spine without contrast no convincing acute cervical spine abnormality. Pt is pleasantly confused and can not provide accurate historical information. Pt received zyprexa  in the ED. Family is desiring NH placement. Pt is admitted to  services for further management.      Pt is noted to have serosanguinous discharge form altered skin integrity located occipital scalp. Wound care was consulted and culture obtained.  Wound cultures grew MRSA and treated with Doxycyline. Seroquel was continued to help with hospital-acquired delirium. Later changed to risperidone and additionally Depakote was added for behavior control related to dementia with good result. Home meds are reviewed and resumed as appropriate. PT/OT consulted and SNF placement suggested. CM consulted to assist with DC planning. Appetite continues to be poor.  Periactin added. Workman catheter placed on 10/22/23 due to unable to void spontaneously. Flomax continued. Pt denied by multiple  places. CM continues to work on placement.     Interval Hx:   No acute events reported overnight   Pt is calm and cooperative   Oriented to self only.   Intermittent soft BP noted but MAP is favorable     I spoke to pt's son Soy Loera and discussed option of home hospice instead of NH placement given pt is now more manageable as she is not exhibiting behavioral outburst anymore. Pt's son is open to that option and willing to speak to CM to get more info on home hospice.     Case was discussed with patient's nurse and  on the floor.    Objective/physical exam:  General: In no acute distress, afebrile  Chest: Clear to auscultation bilaterally  Heart: RRR, +S1, S2, no appreciable murmur  Abdomen: Soft, nontender, BS +  MSK: Warm, no lower extremity edema, no clubbing or cyanosis  Neurologic: Awake , oriented to self, Moves all ext spontaneously    VITAL SIGNS: 24 HRS MIN & MAX LAST   Temp  Min: 97.3 °F (36.3 °C)  Max: 98.8 °F (37.1 °C) 98.8 °F (37.1 °C)   BP  Min: 96/62  Max: 135/84 130/83   Pulse  Min: 54  Max: 99  99   Resp  Min: 18  Max: 18 18   SpO2  Min: 95 %  Max: 100 % 98 %     I have reviewed the following labs:  Recent Labs   Lab 10/19/23  0743   WBC 8.45   RBC 3.22*   HGB 9.6*   HCT 30.0*   MCV 93.2   MCH 29.8   MCHC 32.0*   RDW 15.2      MPV 11.2*     Recent Labs   Lab 10/19/23  0743 10/23/23  0512    141   K 4.0 4.2   CO2 30 32*   BUN 15.4 19.7   CREATININE 0.71 0.74   CALCIUM 8.1* 8.1*     Microbiology Results (last 7 days)       ** No results found for the last 168 hours. **             See below for Radiology    Scheduled Med:   B12-levomefolate calcium-B6  1 tablet Oral Daily    carvediloL  3.125 mg Oral BID WM    cyproheptadine  4 mg Oral TID AC    divalproex ER  500 mg Oral Nightly    enoxparin  40 mg Subcutaneous Daily    memantine  5 mg Oral BID    polyethylene glycol  17 g Oral Daily    risperiDONE  0.5 mg Oral QHS    sacubitriL-valsartan  1 tablet Oral QHS     senna-docusate 8.6-50 mg  1 tablet Oral BID    tamsulosin  0.4 mg Oral Daily      Continuous Infusions:     PRN Meds:  acetaminophen, aluminum-magnesium hydroxide-simethicone, bisacodyL, cloNIDine, hydrALAZINE, melatonin, naloxone, ondansetron, polyethylene glycol, prochlorperazine, simethicone     Assessment/Plan:    Failure to thrive   Progressive dementia with behavioral disturbance  MRSA posterior right scalp wound, completed treatment  Essential hypertension with intermittent soft BP  Anemia of chronic disease   Cardiomyopathy  Vitamin B12 deficiency  Constipation  Urinary retention - s/p Workman catheter placement 10/22/23     Plan-     Awaiting placement.  Case management is following.   Medically stable otherwise   Antihypertensives dose modified  given soft BP and has improved since   Risperidone dosage lowered to 0.5 mg qpm to prevent daytime somnolence   Periactin added for appetite stimulant   Workman catheter placed on 10/22/23 due to unable to void spontaneously   Continue PT/OT  I discussed code status with pt's son. He elected code status be DNR.      VTE prophylaxis: Lovenox     Patient condition:  Fair     Anticipated discharge and Disposition:     SNF/NH placement          All diagnosis and differential diagnosis have been reviewed; assessment and plan has been documented; I have personally reviewed the labs and test results that are presently available; I have reviewed the patients medication list; I have reviewed the consulting providers response and recommendations. I have reviewed or attempted to review medical records based upon their availability    All of the patient's questions have been  addressed and answered. Patient's is agreeable to the above stated plan. I will continue to monitor closely and make adjustments to medical management as needed.    Timur Hartmann MD   10/25/2023

## 2023-10-25 NOTE — PLAN OF CARE
10/25/23 0818   Discharge Reassessment   Assessment Type Discharge Planning Reassessment   Discharge Plan discussed with: Adult children   Discharge Plan A Skilled Nursing Facility   Discharge Plan B New Nursing Home placement - [facility] care facility   Post-Acute Status   Post-Acute Placement Status Referrals Sent  ([facility])     Spoke to Soy via phone.     1115: [facility] unable to accept. Spoke to Soy via phone, agreed with referrals to [facility], [facility], [facility], and [facility].     1150: [facility] will review patient information. [facility] and [facility] unable to accept.

## 2023-10-25 NOTE — PT/OT/SLP PROGRESS
Occupational Therapy   Treatment    Name: Danielle Loera  MRN: [id number]  Admitting Diagnosis:  Frequent falls       Recommendations:     Recommended therapy intensity at discharge:     Discharge Equipment Recommendations:  to be determined by next level of care  Barriers to discharge:       Assessment:     Danielle Loera is a 89 y.o. female with a medical diagnosis of Frequent falls.  She presents with decreased cognition. Performance deficits affecting function are weakness, gait instability, impaired balance, impaired endurance, decreased safety awareness, impaired cognition, impaired self care skills, impaired functional mobility.     Rehab Prognosis:  Fair; patient would benefit from acute skilled OT services to address these deficits and reach maximum level of function.       Plan:     Patient to be seen 4 x/week to address the above listed problems via self-care/home management, therapeutic activities, therapeutic exercises  Plan of Care Expires: 11/02/23  Plan of Care Reviewed with: patient    Subjective     Pain/Comfort:  N/A    Objective:     Communicated with: nurse prior to session.  Patient found HOB elevated with pulse ox (continuous), telemetry upon OT entry to room.    General Precautions: Standard, fall    Orthopedic Precautions:N/A  Braces: N/A  Respiratory Status: Room air     Occupational Performance:     Bed Mobility:    Supine to sit and back with Mod A, scooting to edge of bed in sitting with Max A, maintain sitting edge of bed with Min A, bed mobility with Max A    Functional Mobility/Transfers:  Sit to stand Max A x 2/Total A, 3 trials with RW    Activities of Daily Living:  Total A for toilet hygiene in standing    Therapeutic Positioning    OT interventions performed during the course of today's session in an effort to prevent and/or reduce acquired pressure injuries:   Therapeutic positioning was provided at the conclusion of session to offload all bony prominences for the  prevention and/or reduction of pressure injuries    [facility] 6 Click ADL: 11    Patient Education:  Patient provided with verbal education and demonstrations education regarding safety awareness.  Additional teaching is warranted.      Patient left HOB elevated with all lines intact and call button in reach    GOALS:   Multidisciplinary Problems       Occupational Therapy Goals          Problem: Occupational Therapy    Goal Priority Disciplines Outcome Interventions   Occupational Therapy Goal     OT, PT/OT Ongoing, Progressing    Description: Goals to be met by: 11/2/2023    Patient will increase functional independence with ADLs by performing:    UE Dressing with Contact Guard Assistance.  Grooming while seated at sink with Minimal Assistance.  Toileting from toilet with Minimal Assistance for hygiene and clothing management.   Toilet transfer to toilet with Minimal Assistance.                         Time Tracking:     OT Date of Treatment: 10/25/23  OT Start Time: 1355  OT Stop Time: 1433  OT Total Time (min): 38 min    Billable Minutes:Self Care/Home Management 15  Therapeutic Activity 23    OT/JUSTIN: JUSTIN     Number of JUSTIN visits since last OT visit: 4    10/25/2023

## 2023-10-25 NOTE — PT/OT/SLP RE-EVAL
Physical Therapy Re-Evaluation    Patient Name:  Danielle Loera   MRN:  [id number]    Recommendations:     Discharge therapy intensity: moderate intensity vs. Family considering home hospice per chart  Discharge Equipment Recommendations:  (TBD)   Barriers to discharge: Impaired mobility    Assessment:     Danielle Loera is a 89 y.o. female admitted with a medical diagnosis of Frequent falls.  She presents with the following impairments/functional limitations: weakness, impaired endurance, impaired balance, impaired functional mobility, impaired self care skills, decreased safety awareness, impaired cognition . Has consistently required 2 person assist for mobility with inability to ambulate, which is a decline from PLOF. Will continue to follow to encourage improvement in functional mobility.    Rehab Prognosis: Good; patient would benefit from acute skilled PT services to address these deficits and reach maximum level of function.    Recent Surgery: * No surgery found *      Plan:     During this hospitalization, patient to be seen 3 x/week to address the identified rehab impairments via gait training, therapeutic activities, therapeutic exercises, neuromuscular re-education and progress toward the following goals:    Plan of Care Expires:  11/25/23    Subjective     Chief Complaint: none  Patient/Family Comments/goals: unable to state  Pain/Comfort:  Pain Rating 1: 0/10    Patients cultural, spiritual, Shinto conflicts given the current situation: no    Objective:     Communicated with RN prior to session.  Patient found HOB elevated with pulse ox (continuous), telemetry  upon PT entry to room.    General Precautions: Standard, fall  Orthopedic Precautions:N/A   Braces: N/A  Respiratory Status: Room air  Blood Pressure: NT      Exams:  RLE Strength: Not following commands for MMT, appears at least 3/5  LLE Strength: as above  Skin integrity:  sacral wound, WC following      Functional Mobility:  Bed  Mobility:     Supine to Sit: total assistance and of 2 persons  Sit to Supine: total assistance and of 2 persons  Transfers:     Sit to Stand:  total assistance and of 2 persons with rolling walker  Balance: Sitting balance = SBA-[place]nes in neutral alignment      AM-PAC 6 CLICK MOBILITY  Total Score:10       Treatment & Education:  2 STS from EOB performed with totalA, PT tech performed pericare with totalA in standing 2/2 loose BM onto chucks during first stand.     Patient provided with verbal education education regarding call light use.  Additional teaching is warranted.     Patient left HOB elevated with all lines intact, call button in reach, and RN/CNA notified.    GOALS:   Multidisciplinary Problems       Physical Therapy Goals          Problem: Physical Therapy    Goal Priority Disciplines Outcome Goal Variances Interventions   Physical Therapy Goal     PT, PT/OT Ongoing, Progressing     Description: Goals to be met by: 11/25/23     Patient will increase functional independence with mobility by performing:    D/C 10/25/23:  1. Supine to sit with Modified [place]   2. Sit to stand transfer with Modified [place]  3. Gait  x 200 feet with Modified [place] using Rolling Walker.     Updated goals 10/25/23  1. Supine<>sit and rolling R and L with moderate assistance.  2. Bed<>chair transfers with moderate assistance.  3. PT/PTA to conduct family training/education on mobility if D/C home with son.                         History:     Past Medical History:   Diagnosis Date    Anemia due to stage 3a chronic kidney disease 5/12/2023    Primary hypertension 5/12/2023    Severe dementia without behavioral disturbance, psychotic disturbance, mood disturbance, or anxiety 2/23/2023    Vitamin D deficiency 5/12/2023       No past surgical history on file.    Time Tracking:     PT Received On: 10/25/23  PT Start Time: 1405     PT Stop Time: 1419  PT Total Time (min): 14 min     Billable Minutes: Re-eval 14  mins  Co-tx with OT 2/2 limited functional capacity.    10/25/2023

## 2023-10-25 NOTE — PLAN OF CARE
Problem: Physical Therapy  Goal: Physical Therapy Goal  Description: Goals to be met by: 11/25/23     Patient will increase functional independence with mobility by performing:    D/C 10/25/23:  1. Supine to sit with Modified [place]   2. Sit to stand transfer with Modified [place]  3. Gait  x 200 feet with Modified [place] using Rolling Walker.     Updated goals 10/25/23  1. Supine<>sit and rolling R and L with moderate assistance.  2. Bed<>chair transfers with moderate assistance.  3. PT/PTA to conduct family training/education on mobility if D/C home with son.    Outcome: Ongoing, Progressing

## 2023-10-26 PROCEDURE — 25000003 PHARM REV CODE 250: Performed by: NURSE PRACTITIONER

## 2023-10-26 PROCEDURE — 25000003 PHARM REV CODE 250: Performed by: INTERNAL MEDICINE

## 2023-10-26 PROCEDURE — 11000001 HC ACUTE MED/SURG PRIVATE ROOM

## 2023-10-26 PROCEDURE — 63600175 PHARM REV CODE 636 W HCPCS: Performed by: NURSE PRACTITIONER

## 2023-10-26 RX ADMIN — TAMSULOSIN HYDROCHLORIDE 0.4 MG: 0.4 CAPSULE ORAL at 10:10

## 2023-10-26 RX ADMIN — CYPROHEPTADINE HYDROCHLORIDE 4 MG: 4 TABLET ORAL at 05:10

## 2023-10-26 RX ADMIN — DIVALPROEX SODIUM 500 MG: 500 TABLET, FILM COATED, EXTENDED RELEASE ORAL at 09:10

## 2023-10-26 RX ADMIN — CARVEDILOL 3.12 MG: 3.12 TABLET, FILM COATED ORAL at 10:10

## 2023-10-26 RX ADMIN — ENOXAPARIN SODIUM 40 MG: 40 INJECTION SUBCUTANEOUS at 05:10

## 2023-10-26 RX ADMIN — Medication 1 TABLET: at 10:10

## 2023-10-26 RX ADMIN — MEMANTINE 5 MG: 5 TABLET ORAL at 10:10

## 2023-10-26 RX ADMIN — RISPERIDONE 0.5 MG: 0.25 TABLET, FILM COATED ORAL at 09:10

## 2023-10-26 RX ADMIN — CARVEDILOL 3.12 MG: 3.12 TABLET, FILM COATED ORAL at 05:10

## 2023-10-26 RX ADMIN — CYPROHEPTADINE HYDROCHLORIDE 4 MG: 4 TABLET ORAL at 10:10

## 2023-10-26 RX ADMIN — MEMANTINE 5 MG: 5 TABLET ORAL at 09:10

## 2023-10-26 NOTE — PLAN OF CARE
Spoke to Soy via phone, agreed to referrals to be sent to [facility]. [facility] working with Soy to set up sitter services. Possible DC tomorrow once sitters set up.

## 2023-10-26 NOTE — PT/OT/SLP DISCHARGE
Occupational Therapy Discharge Summary    Danielle Loera  MRN: [id number]   Principal Problem: Frequent falls      Patient Discharged from acute Occupational Therapy on 10/26/23.  Please refer to prior OT note dated 10/25/23 for last functional status. On 10/26/23, pt not opening eyes or following any commands, poor TRAVON.    Assessment:      Patient is no longer making progress. Hospice referral sent. No longer appropriate for skilled therapy.    Objective:     GOALS:   Multidisciplinary Problems       Occupational Therapy Goals          Problem: Occupational Therapy    Goal Priority Disciplines Outcome Interventions   Occupational Therapy Goal     OT, PT/OT Ongoing, Progressing    Description: Goals to be met by: 11/2/2023    Patient will increase functional independence with ADLs by performing:    UE Dressing with Contact Guard Assistance.  Grooming while seated at sink with Minimal Assistance.  Toileting from toilet with Minimal Assistance for hygiene and clothing management.   Toilet transfer to toilet with Minimal Assistance.                         Reasons for Discontinuation of Therapy Services  Therapist determines that the patient will no longer benefit from therapy services.      Plan:     Patient Discharged to:  pending: hospice referral sent.     10/26/2023

## 2023-10-27 VITALS
WEIGHT: 143 LBS | RESPIRATION RATE: 18 BRPM | OXYGEN SATURATION: 98 % | TEMPERATURE: 98 F | SYSTOLIC BLOOD PRESSURE: 127 MMHG | HEART RATE: 75 BPM | BODY MASS INDEX: 22.44 KG/M2 | DIASTOLIC BLOOD PRESSURE: 73 MMHG | HEIGHT: 67 IN

## 2023-10-27 PROBLEM — L08.9 INFECTED HEMATOMA: Status: ACTIVE | Noted: 2023-10-27

## 2023-10-27 PROBLEM — L08.9 INFECTED HEMATOMA: Status: RESOLVED | Noted: 2023-10-27 | Resolved: 2023-10-27

## 2023-10-27 PROBLEM — T14.8XXA INFECTED HEMATOMA: Status: ACTIVE | Noted: 2023-10-27

## 2023-10-27 PROBLEM — F03.918 DEMENTIA WITH BEHAVIORAL DISTURBANCE: Status: ACTIVE | Noted: 2023-02-23

## 2023-10-27 PROBLEM — T14.8XXA INFECTED HEMATOMA: Status: RESOLVED | Noted: 2023-10-27 | Resolved: 2023-10-27

## 2023-10-27 PROCEDURE — 25000003 PHARM REV CODE 250: Performed by: INTERNAL MEDICINE

## 2023-10-27 PROCEDURE — 25000003 PHARM REV CODE 250: Performed by: NURSE PRACTITIONER

## 2023-10-27 RX ORDER — FUROSEMIDE 20 MG/1
20 TABLET ORAL EVERY OTHER DAY
Qty: 15 TABLET | Refills: 11 | Status: SHIPPED | OUTPATIENT
Start: 2023-10-27 | End: 2024-10-26

## 2023-10-27 RX ORDER — MEMANTINE HYDROCHLORIDE 5 MG/1
5 TABLET ORAL 2 TIMES DAILY
Qty: 60 TABLET | Refills: 11 | Status: SHIPPED | OUTPATIENT
Start: 2023-10-27 | End: 2024-10-26

## 2023-10-27 RX ORDER — CYPROHEPTADINE HYDROCHLORIDE 4 MG/1
4 TABLET ORAL
Qty: 30 TABLET | Refills: 0 | Status: SHIPPED | OUTPATIENT
Start: 2023-10-27

## 2023-10-27 RX ORDER — TAMSULOSIN HYDROCHLORIDE 0.4 MG/1
0.4 CAPSULE ORAL DAILY
Qty: 30 CAPSULE | Refills: 11 | Status: SHIPPED | OUTPATIENT
Start: 2023-10-28 | End: 2024-10-27

## 2023-10-27 RX ORDER — DIVALPROEX SODIUM 500 MG/1
500 TABLET, FILM COATED, EXTENDED RELEASE ORAL NIGHTLY
Qty: 30 TABLET | Refills: 11 | Status: SHIPPED | OUTPATIENT
Start: 2023-10-27 | End: 2024-10-26

## 2023-10-27 RX ORDER — RISPERIDONE 0.5 MG/1
0.5 TABLET ORAL NIGHTLY
Qty: 30 TABLET | Refills: 11 | Status: SHIPPED | OUTPATIENT
Start: 2023-10-27 | End: 2024-10-26

## 2023-10-27 RX ADMIN — Medication 1 TABLET: at 09:10

## 2023-10-27 RX ADMIN — POLYETHYLENE GLYCOL 3350 17 G: 17 POWDER, FOR SOLUTION ORAL at 09:10

## 2023-10-27 RX ADMIN — SENNOSIDES AND DOCUSATE SODIUM 1 TABLET: 50; 8.6 TABLET ORAL at 09:10

## 2023-10-27 RX ADMIN — CYPROHEPTADINE HYDROCHLORIDE 4 MG: 4 TABLET ORAL at 06:10

## 2023-10-27 RX ADMIN — MEMANTINE 5 MG: 5 TABLET ORAL at 09:10

## 2023-10-27 RX ADMIN — TAMSULOSIN HYDROCHLORIDE 0.4 MG: 0.4 CAPSULE ORAL at 09:10

## 2023-10-27 RX ADMIN — CARVEDILOL 3.12 MG: 3.12 TABLET, FILM COATED ORAL at 09:10

## 2023-10-27 RX ADMIN — CYPROHEPTADINE HYDROCHLORIDE 4 MG: 4 TABLET ORAL at 11:10

## 2023-10-27 NOTE — PROGRESS NOTES
[facility] Hospital Medicine Progress Note        Chief Complaint: Inpatient Follow-up for  failure to thrive     HPI:   89 y.o. female with  PMHx of  HTN, CKD stage III, dementia, anemia;, anxiety;  presents to the ED s/p fall out of bed at home with associated head injury. It was reported per the family pt climbed out of bed and fell hitting the back of her head. Pt was recently admitted to our services last month when she had a fall at that itme and discharged to  rehab and was reportedly discharged from rehab the day of arrival in the ED. No reports of LOC, no reports of CP, SOB, N/V/D or fever per family reports.      Initial VS /75 P 76 R 16 T 99 F O2 saturation 98% on room air.  Labs reviewed demonstrated HH 9.2/28.2, BUN/Creat 22.8/1.09. CT of the head without contrast showed no new or worsened intracranial abnormality. Re-demonstrated posterior right parietal scalp hematoma without depressed skull fracture. CT of the cervical spine without contrast no convincing acute cervical spine abnormality. Pt is pleasantly confused and can not provide accurate historical information. Pt received zyprexa  in the ED. Family is desiring NH placement. Pt is admitted to  services for further management.      Pt is noted to have serosanguinous discharge form altered skin integrity located occipital scalp. Wound care was consulted and culture obtained.  Wound cultures grew MRSA and treated with Doxycyline. Seroquel was continued to help with hospital-acquired delirium. Later changed to risperidone and additionally Depakote was added for behavior control related to dementia with good result. Home meds are reviewed and resumed as appropriate. PT/OT consulted and SNF placement suggested. CM consulted to assist with DC planning. Appetite continues to be poor.  Periactin added. Workman catheter placed on 10/22/23 due to unable to void spontaneously. Flomax continued. Pt denied by multiple  "places. CM continues to work on placement.     Interval Hx:   10/25/23-No acute events reported overnight   Pt is calm and cooperative   Oriented to self only.   Intermittent soft BP noted but MAP is favorable     I spoke to pt's son Soy Loera and discussed option of home hospice instead of NH placement given pt is now more manageable as she is not exhibiting behavioral outburst anymore. Pt's son is open to that option and willing to speak to CM to get more info on home hospice.     10/26/23  - working on [facility]'s hospice     Case was discussed with patient's nurse and  on the floor.  Objective/physical exam:  General: In no acute distress, afebrile  Chest: Clear to auscultation bilaterally  Heart: RRR, +S1, S2, no appreciable murmur  Abdomen: Soft, nontender, BS +  MSK: Warm, no lower extremity edema, no clubbing or cyanosis  Neurologic: Awake , oriented to self, Moves all ext spontaneously    VITAL SIGNS: 24 HRS MIN & MAX LAST   Temp  Min: 97.6 °F (36.4 °C)  Max: 98.4 °F (36.9 °C) 98.4 °F (36.9 °C)   BP  Min: 94/64  Max: 115/78 112/73   Pulse  Min: 72  Max: 90  72   Resp  Min: 16  Max: 18 16   SpO2  Min: 96 %  Max: 98 % 98 %     I have reviewed the following labs:  No results for input(s): "WBC", "RBC", "HGB", "HCT", "MCV", "MCH", "MCHC", "RDW", "PLT", "MPV", "GRAN", "LYMPH", "MONO", "BASO", "NRBC" in the last 168 hours.    Recent Labs   Lab 10/23/23  0512      K 4.2   CO2 32*   BUN 19.7   CREATININE 0.74   CALCIUM 8.1*       Microbiology Results (last 7 days)       ** No results found for the last 168 hours. **             See below for Radiology    Scheduled Med:   B12-levomefolate calcium-B6  1 tablet Oral Daily    carvediloL  3.125 mg Oral BID WM    cyproheptadine  4 mg Oral TID AC    divalproex ER  500 mg Oral Nightly    enoxparin  40 mg Subcutaneous Daily    memantine  5 mg Oral BID    polyethylene glycol  17 g Oral Daily    risperiDONE  0.5 mg Oral QHS    " sacubitriL-valsartan  1 tablet Oral QHS    senna-docusate 8.6-50 mg  1 tablet Oral BID    tamsulosin  0.4 mg Oral Daily      Continuous Infusions:     PRN Meds:  acetaminophen, aluminum-magnesium hydroxide-simethicone, bisacodyL, cloNIDine, hydrALAZINE, melatonin, naloxone, ondansetron, polyethylene glycol, prochlorperazine, simethicone     Assessment/Plan:    Failure to thrive   Progressive dementia with behavioral disturbance  MRSA posterior right scalp wound, completed treatment  Essential hypertension with intermittent soft BP  Anemia of chronic disease   Cardiomyopathy  Vitamin B12 deficiency  Constipation  Urinary retention - s/p Workman catheter placement 10/22/23     Plan-     Await[facility] w [facility].  Case management is following.   Medically stable otherwise   Antihypertensives dose modified  given soft BP and has improved since   Risperidone dosage lowered to 0.5 mg qpm to prevent daytime somnolence   Periactin added for appetite stimulant   Workman catheter placed on 10/22/23 due to unable to void spontaneously   Continue PT/OT  I discussed code status with pt's son. He elected code status be DNR.      VTE prophylaxis: Lovenox     Patient condition:  Fair     Anticipated discharge and Disposition:     SNF/NH placement          All diagnosis and differential diagnosis have been reviewed; assessment and plan has been documented; I have personally reviewed the labs and test results that are presently available; I have reviewed the patients medication list; I have reviewed the consulting providers response and recommendations. I have reviewed or attempted to review medical records based upon their availability    All of the patient's questions have been  addressed and answered. Patient's is agreeable to the above stated plan. I will continue to monitor closely and make adjustments to medical management as needed.    Abdelrahman Watt MD   10/26/2023

## 2023-10-27 NOTE — PROGRESS NOTES
[facility] - 9th Floor Med Surg  Wound Care    Patient Name:  Danielle Loera   MRN:  [id number]  Date: 10/27/2023  Diagnosis: Frequent falls    History:     Past Medical History:   Diagnosis Date    Anemia due to stage 3a chronic kidney disease 5/12/2023    Primary hypertension 5/12/2023    Severe dementia without behavioral disturbance, psychotic disturbance, mood disturbance, or anxiety 2/23/2023    Vitamin D deficiency 5/12/2023       Social History     Socioeconomic History    Marital status:     Number of children: 4   Occupational History    Occupation: retied   Tobacco Use    Smoking status: Never     Passive exposure: Never    Smokeless tobacco: Never   Substance and Sexual Activity    Alcohol use: Never    Drug use: Never    Sexual activity: Not Currently     Social Determinants of Health     Financial Resource Strain: Low Risk  (8/2/2023)    Overall Financial Resource Strain (CARDIA)     Difficulty of Paying Living Expenses: Not hard at all   Food Insecurity: No Food Insecurity (8/2/2023)    Hunger Vital Sign     Worried About Running Out of Food in the Last Year: Never true     Ran Out of Food in the Last Year: Never true   Transportation Needs: No Transportation Needs (8/2/2023)    PRAPARE - Transportation     Lack of Transportation (Medical): No     Lack of Transportation (Non-Medical): No   Physical Activity: Insufficiently Active (8/2/2023)    Exercise Vital Sign     Days of Exercise per Week: 1 day     Minutes of Exercise per Session: 10 min   Stress: No Stress Concern Present (8/2/2023)    [employer] - Occupational Stress Questionnaire     Feeling of Stress : Not at all   Social Connections: Moderately Isolated (8/2/2023)    Social Connection and Isolation Panel [NHANES]     Frequency of Communication with Friends and Family: More than three times a week     Frequency of Social Gatherings with Friends and Family: More than three times a week      Attends L[employer] Services: More than 4 times per year     Active Member of Clubs or Organizations: No     Attends Club or Organization Meetings: Never     Marital Status:    Housing Stability: Low Risk  (8/2/2023)    Housing Stability Vital Sign     Unable to Pay for Housing in the Last Year: No     Number of Places Lived in the Last Year: 1     Unstable Housing in the Last Year: No       Precautions:     Allergies as of 09/29/2023    (No Known Allergies)       WO Assessment Details/Treatment        10/27/23 0939   WOCN Assessment   Visit Date 10/27/23   Visit Time 0939   Consult Type Follow Up   WOCN Speciality Wound   Intervention assessed;changed   Teaching on-going   Skin Interventions   Device Skin Pressure Protection absorbent pad utilized/changed   Pressure Reduction Devices specialty bed utilized        Altered Skin Integrity 10/02/23 posterior Head Other (comment)   Date First Assessed: 10/02/23   Altered Skin Integrity Present on Admission - Did Patient arrive to the hospital with altered skin?: yes  Orientation: posterior  Location: Head  Primary Wound Type: (c) Other (comment)   Wound Image    Dressing Appearance Dry;Intact;Clean   Drainage Amount Small   Drainage Characteristics/Odor Serosanguineous   Appearance Pink   Tissue loss description Full thickness   Red (%), Wound Tissue Color 100 %   Periwound Area Intact   Wound Edges Defined   Wound Length (cm) 1 cm   Wound Width (cm) 0.7 cm   Wound Depth (cm) 0.2 cm   Wound Volume (cm^3) 0.14 cm^3   Wound Surface Area (cm^2) 0.7 cm^2   Care Cleansed with:;Sterile normal saline   Dressing Applied     WOCN follow up for occiput I&D. Hair matted to wound bed.  Continue with current treatment recommendations put into place.  Occiput head: Cleanse with NS. Pack with 1/4 inch iodoform. Cover foam dressing or medipore tape. Change daily. Nursing to continue with preventative measures. On LUCRECIA mattress. Will follow up.     10/27/2023

## 2023-10-27 NOTE — PLAN OF CARE
10/27/23 1047   Final Note   Assessment Type Final Discharge Note   Anticipated Discharge Disposition HospiceHome   Post-Acute Status   Post-Acute Authorization Hospice   Hospice Status Set-up Complete/Auth obtained  ([facility])     [facility] will set up ambulance transport.

## 2023-10-27 NOTE — NURSING
Patient has very poor trunk control. Unable to sit in wheelchair for transport. Will need to be transported by bed or stretcher.

## 2023-10-27 NOTE — PROGRESS NOTES
Inpatient Nutrition Assessment    Admit Date: 9/29/2023   Total duration of encounter: 28 days     Nutrition Recommendation/Prescription     Continue current diet as tolerated  Continue Boost Plus BID (provides 360 kcal and 14 g protein per container)  If aggressive treatment desired, consult RD for additional recs    Communication of Recommendations: reviewed with nurse    Nutrition Assessment     Malnutrition Assessment/Nutrition-Focused Physical Exam    Malnutrition Context: acute illness or injury (10/20/23 1530)  Malnutrition Level: moderate (10/20/23 1530)  Energy Intake (Malnutrition): less than 75% for greater than 7 days (10/20/23 1530)  Weight Loss (Malnutrition): greater than 5% in 1 month (10/03/23 1455)  Subcutaneous Fat (Malnutrition): other (see comments) (Does not meet criteria) (10/03/23 1455)           Muscle Mass (Malnutrition): other (see comments) (Does not meet criteria) (10/03/23 1455)                          Fluid Accumulation (Malnutrition): other (see comments) (Does not meet criteria) (10/03/23 1455)        A minimum of two characteristics is recommended for diagnosis of either severe or non-severe malnutrition.    Chart Review    Reason Seen: continuous nutrition monitoring and follow-up wound    Malnutrition Screening Tool Results   Have you recently lost weight without trying?: No  Have you been eating poorly because of a decreased appetite?: No   MST Score: 0   Diagnosis:  Acute encephalopathy in the setting of dementia  Recent closed head injury from fall, resulting in hematoma with confusion  MRSA positive scalp wound cultures  Dementia with behavioral disturbances    Relevant Medical History:   HTN  CKD stage III  Dementia  Anemia  Vitamin D deficiency    Nutrition-Related Medications:   Scheduled Meds:   B12-levomefolate calcium-B6  1 tablet Oral Daily    carvediloL  3.125 mg Oral BID WM    cyproheptadine  4 mg Oral TID AC    divalproex ER  500 mg Oral Nightly    enoxparin  40 mg  Subcutaneous Daily    memantine  5 mg Oral BID    polyethylene glycol  17 g Oral Daily    risperiDONE  0.5 mg Oral QHS    sacubitriL-valsartan  1 tablet Oral QHS    senna-docusate 8.6-50 mg  1 tablet Oral BID    tamsulosin  0.4 mg Oral Daily     Continuous Infusions:  PRN Meds:.acetaminophen, aluminum-magnesium hydroxide-simethicone, bisacodyL, cloNIDine, hydrALAZINE, melatonin, naloxone, ondansetron, polyethylene glycol, prochlorperazine, simethicone    Calorie Containing IV Medications: no significant kcals from medications at this time    Nutrition-Related Labs:  10/3/2023: Gluc 94  10/6/2023: no new labs  10/11: Ca 8.2  10/13: no updated  10/19: Ca 8.1  10/23: Ca 8.1    Diet/PN Order: Diet heart healthy  Diet Cardiac  Oral Supplement Order: Boost Max  Tube Feeding Order: none  Appetite/Oral Intake: fair/25-50% of meals  Factors Affecting Nutritional Intake: decreased appetite  Food/Jew/Cultural Preferences: unable to obtain  Food Allergies: no known food allergies    Wound(s):      Altered Skin Integrity 10/21/23 2000 Coccyx #2-Tissue loss description: Full thickness       Altered Skin Integrity 10/02/23 posterior Head Other (comment)-Tissue loss description: Full thickness noted    Last Bowel Movement: 10/25/23    Comments    10/3/2023: Pt unable to answer questions clearly. Per tray observation, pt has <25% PO intake of breakfast. Pt reported not being hungry. Will add Boost Plus BID as preventative MNT. Encouraged adequate PO intake. No reported N/V. Last BM documented as 9/29/2023. Per EMR, pt weighed 71.7 kg on 9/6/2023 (9.5% wt loss in 1 month, significant). Will monitor.    10/6/2023: Spoke with nurse.The nurse reports pt refused breakfast, presumed poor appetite. The nurse denies N/V/D/C. Last BM noted. Boost Plus ordered. Encouraged adequate PO intake.    10/11 pt sleeping, ate about 25% of lunch per nursing, drinking some boost    10/13 pt sleeping, nurse reports pt not eating much, not drinking  "the boost today either; discussed adding an appetite stimulant with nurse; says pt is usually more awake but she has been started on a new medication that is making her tired    10/17 fair appetite, tolerating oral diet, eating better    10/20 nurse reports she is not eating much today, but drinking the boost max    10/24 nurse reports she has not eaten today or drank any boost, is sleeping most of the day but will attempt to feed her this afternoon    10/27 noted plans for d/c with hospice this afternoon    Anthropometrics    Height: 5' 7" (170.2 cm) Height Method: Stated  Last Weight: 64.9 kg (143 lb) (23) Weight Method: Standard Scale  BMI (Calculated): 22.4  BMI Classification: normal (BMI 18.5-24.9)     Ideal Body Weight (IBW), Female: 135 lb     % Ideal Body Weight, Female (lb): 105.93 %                    Usual Body Weight (UBW), k.7 kg  % Usual Body Weight: 90.66     Usual Weight Provided By: EMR weight history    Wt Readings from Last 5 Encounters:   23 64.9 kg (143 lb)   23 71.7 kg (158 lb)   23 71.7 kg (158 lb)   23 75.3 kg (166 lb)   23 69 kg (152 lb 1.9 oz)     Weight Change(s) Since Admission:  Admit Weight: 64.9 kg (143 lb) (23)  2023: 64.9 kg  10/6/2023: no new wts    Estimated Needs    Weight Used For Calorie Calculations: 64.9 kg (143 lb 1.3 oz)  Energy Calorie Requirements (kcal): 0231-3016 (25-30 kcal/kg)  Energy Need Method: Kcal/kg  Weight Used For Protein Calculations: 64.9 kg (143 lb 1.3 oz)  Protein Requirements: 65-78 (1.0-1.2 g/kg)  Fluid Requirements (mL): 1622 (1 mL/kcal)  Temp (24hrs), Av.9 °F (36.6 °C), Min:97.6 °F (36.4 °C), Max:98.4 °F (36.9 °C)       Enteral Nutrition    Patient not receiving enteral nutrition at this time.    Parenteral Nutrition    Patient not receiving parenteral nutrition support at this time.    Evaluation of Received Nutrient Intake    Calories: not meeting estimated needs  Protein: not meeting " estimated needs    Patient Education    Not applicable.    Nutrition Diagnosis     PES: Inadequate oral intake related to acute illness as evidenced by <50% intake of meals since admit. (continues)    Interventions/Goals     Intervention(s): general/healthful diet and commercial beverage  Goal: Meet greater than 75% of nutritional needs by follow-up. (goal progressing)    Monitoring & Evaluation     Dietitian will monitor food and beverage intake, weight, electrolyte/renal panel, glucose/endocrine profile, and gastrointestinal profile.  Nutrition Risk/Follow-Up: high (follow-up in 1-4 days)   Please consult if re-assessment needed sooner.

## 2023-10-27 NOTE — DISCHARGE SUMMARY
[facility] Medicine Discharge Summary    Admit Date: 9/29/2023  Discharge Date and Time: 10/27/82930:39 AM  Admitting Physician:  Team  Discharging Physician: Abdelrahman Watt MD.  Primary Care Physician: Samra Alfredo MD  Consults: None    Discharge Diagnoses:    Failure to thrive     Progressive dementia with behavioral disturbance    MRSA posterior right scalp wound, completed treatment    Essential hypertension with intermittent soft BP    Anemia of chronic disease     Cardiomyopathy    Vitamin B12 deficiency    Constipation    Urinary retention - s/p Workman catheter placement 10/22/23    Falls    Head soft tissue trauma w hematoma/ scalp breakdown w discharge- mrsa, treated       Hospital Course:   89 y.o. female with  PMHx of  HTN, CKD stage III, dementia, anemia;, anxiety;  presents to the ED s/p fall out of bed at home with associated head injury. It was reported per the family pt climbed out of bed and fell hitting the back of her head. Pt was recently admitted to our services last month when she had a fall at that itme and discharged to  rehab and was reportedly discharged from rehab the day of arrival in the ED. No reports of LOC, no reports of CP, SOB, N/V/D or fever per family reports.     Initial VS /75 P 76 R 16 T 99 F O2 saturation 98% on room air.  Labs reviewed demonstrated HH 9.2/28.2, BUN/Creat 22.8/1.09. CT of the head without contrast showed no new or worsened intracranial abnormality. Re-demonstrated posterior right parietal scalp hematoma without depressed skull fracture. CT of the cervical spine without contrast no convincing acute cervical spine abnormality. Pt is pleasantly confused and cannot provide accurate historical information. Pt received zyprexa  in the ED. Family is desiring NH placement. Pt is admitted to  services for further management.     Pt is noted to have serosanguinous discharge form altered skin integrity located  "occipital scalp. Wound care was consulted and culture obtained.  Wound cultures grew MRSA and treated with Doxycyline. Seroquel was continued to help with hospital-acquired delirium. Later changed to risperidone and additionally Depakote was added for behavior control related to dementia with good result. Home meds are reviewed and resumed as appropriate. PT/OT consulted and SNF placement suggested(insurance denied, required peer to peer). CM consulted to assist with DC planning. Appetite continues to be poor.  Periactin added. Sewell catheter placed on 10/22/23 due to unable to void spontaneously. Flomax continued. Pt denied by multiple nursing homes. CM continues to work on placement.      Hospitalist  spoke to pt's son Soy Loera and discussed option of home hospice instead of NH placement given pt is now more manageable as she is not exhibiting behavioral outburst anymore after psych meds adjusted. Pt's son is open to that option and willing to speak to CM to get more info on home hospice. [facility] decided to [facility].  Pt discharged. Pt will be discharge w sewell cath.     Pt was seen and examined on the day of discharge  Vitals:  VITAL SIGNS: 24 HRS MIN & MAX LAST   Temp  Min: 97.6 °F (36.4 °C)  Max: 98.4 °F (36.9 °C) 97.6 °F (36.4 °C)   BP  Min: 110/79  Max: 125/83 125/83   Pulse  Min: 67  Max: 86  71   Resp  Min: 18  Max: 20 18   SpO2  Min: 97 %  Max: 98 % 98 %       Physical Exam:  General: In no acute distress, afebrile    Chest: Clear to auscultation bilaterally    Heart: RRR, +S1, S2, no appreciable murmur    Abdomen: Soft, nontender, BS +    MSK: Warm, no lower extremity edema, no clubbing or cyanosis    Neurologic: Awake , oriented to self, Moves all ext spontaneously    Procedures Performed: No admission procedures for hospital encounter.     Significant Diagnostic Studies: See Full reports for all details    No results for input(s): "WBC", "RBC", "HGB", "HCT", "MCV", "MCH", "MCHC", " ""RDW", "PLT", "MPV", "GRAN", "LYMPH", "MONO", "BASO", "NRBC" in the last 168 hours.    Recent Labs   Lab 10/23/23  0512      K 4.2   CO2 32*   BUN 19.7   CREATININE 0.74   CALCIUM 8.1*        Microbiology Results (last 7 days)       ** No results found for the last 168 hours. **             CT Cervical Spine Without Contrast  EXAMINATION  CT CERVICAL SPINE WITHOUT CONTRAST    CLINICAL HISTORY  Neck trauma (Age >= 65y);    TECHNIQUE  Non-contrast helical-acquisition CT images of the cervical spine were obtained and multiplanar reformats accomplished by a CT technologist at a separate workstation, pushed to PACS for physician review.    COMPARISON  6 September 2023    FINDINGS  Images were reviewed in bone, soft tissue, and lung windows.    Exam quality: adequate for evaluation    Visualized levels: Skull base through T2.    No newly identified acute cortical displacement or other findings to suggest traumatic cervical spine injury.  Degenerative changes throughout the visualized spinal column are similar.    No prevertebral soft tissue swelling.  Paraspinal musculature and more superficial soft tissues are without acute finding.  Low-density nodule of the right hemithyroid unchanged.    IMPRESSION  1. No convincing acute cervical spine abnormality.  2. Chronic findings without gross change from the exam a few weeks prior.  ==========    Please note ligament, spinal cord, and/or vascular abnormalities cannot be excluded on the basis of this examination.  Additional imaging recommended if there is elevated clinical concern for high-grade soft tissue injury.    RADIATION DOSE  Automated tube current modulation, weight-based exposure dosing, and/or iterative reconstruction technique utilized to reach lowest reasonably achievable exposure rate.    DLP: 1427 mGy*cm    Electronically signed by: Errol Whitten  Date:    09/29/2023  Time:    21:12  CT Head Without Contrast  EXAMINATION  CT HEAD WITHOUT " CONTRAST    CLINICAL HISTORY  Head trauma, moderate-severe;    TECHNIQUE  Axial non-contrast CT images of the head were acquired and multiplanar reconstructions accomplished by a CT technologist at a separate workstation, pushed to PACS for physician review.    COMPARISON  6 September 2023    FINDINGS  Images were reviewed in subdural, brain, soft tissue, and bone windows.    Exam quality: Motion/streak artifact limits assessment of the posterior fossa.    Hemorrhage: No evidence of acute hyperattenuating blood products.    Parenchyma: There is diffuse bilateral supratentorial white matter hypoattenuation, typical of chronic microvascular changes. No discrete mass, mass effect, or CT evidence of acute large vascular territory insult. Gray-white differentiation is preserved.    Midline shift: None.    CSF spaces: Proportional appearance of ventricular and sulcal enlargement. No hydrocephalus. No masses or fluid collections.    Vasculature: No focally hyperdense artery. Scattered carotid siphon calcifications are present. No abnormal densities within the dural sinuses.    Other findings: Findings suggestive of large right posterior scalp hematoma unchanged in comparison.  No new abnormality of the extracranial soft tissues identified.  There is no depressed skull fracture.  Mastoids are well aerated. No focal abnormality of the sella. The included facial structures are unremarkable.    IMPRESSION  1. No new or worsened intracranial abnormality.  2. Redemonstrated posterior right parietal scalp hematoma without depressed skull fracture.  3. Chronic findings above.    RADIATION DOSE  Automated tube current modulation, weight-based exposure dosing, and/or iterative reconstruction technique utilized to reach lowest reasonably achievable exposure rate.    DLP: 1427 mGy*cm    Electronically signed by: Errol Whitten  Date:    09/29/2023  Time:    21:10         Medication List        ASK your doctor about these medications       busPIRone 10 MG tablet  Commonly known as: BUSPAR     carvediloL 3.125 MG tablet  Commonly known as: COREG  Take 1 tablet (3.125 mg total) by mouth 2 (two) times daily with meals.     donepeziL 5 MG tablet  Commonly known as: ARICEPT     ENTRESTO 24-26 mg per tablet  Generic drug: sacubitriL-valsartan     furosemide 20 MG tablet  Commonly known as: LASIX  Take 1 tablet (20 mg total) by mouth 2 (two) times daily.     REXULTI 1 mg Tab  Generic drug: brexpiprazole               Explained in detail to the patient about the discharge plan, medications, and follow-up visits. Pt understands and agrees with the treatment plan  [facility]  Discharged Condition: stable  Diet- cardiac  Dietary Orders (From admission, onward)       Start     Ordered    10/03/23 1504  Dietary nutrition supplements Boost Plus Vanilla; BID  Continuous        Question Answer Comment   Select PO Supplement: Boost Plus Vanilla    Frequency: BID        10/03/23 1503    09/30/23 0044  Diet heart healthy  (Diet/Nutrition [facility])  Diet effective now         09/30/23 0047                   Medications Per MI med rec  Activities as tolerated    For further questions contact hospitalist office    Discharge time 33 minutes    For worsening symptoms, chest pain, shortness of breath, increased abdominal pain, high grade fever, stroke or stroke like symptoms, immediately go to the nearest Emergency Room or call 911 as soon as possible.      Abdelrahman Frank M.D, on 10/27/2023. at 9:39 AM.

## 2023-10-31 ENCOUNTER — TELEPHONE (OUTPATIENT)
Dept: FAMILY MEDICINE | Facility: CLINIC | Age: 88
End: 2023-10-31
Payer: MEDICARE

## 2023-10-31 NOTE — TELEPHONE ENCOUNTER
Spoke to the DON at [facility] and she stated the patient was never accepted to their facility because of the insurance and payment issues. She stated that their facility had previously tried to get in network with [facility], but they only contract with a certain amount of facilities in this area.   The patient would have better options if she was covered under Medicare/Medicaid.    Sheba Mack RN    [facility]

## 2023-11-06 NOTE — TELEPHONE ENCOUNTER
Reviewed discharge summary from hospitalization, patient discharged under hospice with [facility].  Spoke with granddaughter Alycia, she states patient has now moved in with patient's son who is primary caregiver now, and states they are happy with the services thus far but only been few days from discharge.  Advised to monitor patient behaviors and needs and call back. Alycia notes patient will be transitioned to traditional medicare as of 11/1 and should have further options for nursing home from there.  Also they are willing to be private cash pay until the patient has spent down assets.  She states this was discussed with [facility] prior to denial.  She will look into other homes in the area if needed for further care needs, but okay with continuing hospice for now.  Will follow.     Samra Alfredo MD  Attending - Family Medicine / Geriatric Medicine  [facility]